# Patient Record
Sex: FEMALE | Race: OTHER | Employment: UNEMPLOYED | URBAN - METROPOLITAN AREA
[De-identification: names, ages, dates, MRNs, and addresses within clinical notes are randomized per-mention and may not be internally consistent; named-entity substitution may affect disease eponyms.]

---

## 2017-01-07 ENCOUNTER — OFFICE VISIT (OUTPATIENT)
Dept: FAMILY MEDICINE CLINIC | Age: 57
End: 2017-01-07

## 2017-01-07 VITALS
HEIGHT: 65 IN | TEMPERATURE: 98 F | SYSTOLIC BLOOD PRESSURE: 120 MMHG | RESPIRATION RATE: 14 BRPM | HEART RATE: 70 BPM | WEIGHT: 210 LBS | DIASTOLIC BLOOD PRESSURE: 80 MMHG | BODY MASS INDEX: 34.99 KG/M2

## 2017-01-07 DIAGNOSIS — M48.061 SPINAL STENOSIS, LUMBAR REGION, WITHOUT NEUROGENIC CLAUDICATION: ICD-10-CM

## 2017-01-07 DIAGNOSIS — E78.2 MIXED HYPERLIPIDEMIA: ICD-10-CM

## 2017-01-07 DIAGNOSIS — E11.9 TYPE 2 DIABETES MELLITUS WITHOUT COMPLICATION, WITHOUT LONG-TERM CURRENT USE OF INSULIN (HCC): ICD-10-CM

## 2017-01-07 DIAGNOSIS — I10 ESSENTIAL HYPERTENSION: ICD-10-CM

## 2017-01-07 DIAGNOSIS — E11.9 TYPE 2 DIABETES MELLITUS WITHOUT COMPLICATION, WITHOUT LONG-TERM CURRENT USE OF INSULIN (HCC): Primary | ICD-10-CM

## 2017-01-07 LAB
ALBUMIN SERPL-MCNC: 4.7 G/DL (ref 3.9–4.9)
ALP BLD-CCNC: 93 U/L (ref 40–130)
ALT SERPL-CCNC: 44 U/L (ref 0–33)
ANION GAP SERPL CALCULATED.3IONS-SCNC: 13 MEQ/L (ref 7–13)
AST SERPL-CCNC: 33 U/L (ref 0–35)
BILIRUB SERPL-MCNC: 0.3 MG/DL (ref 0–1.2)
BUN BLDV-MCNC: 11 MG/DL (ref 6–20)
CALCIUM SERPL-MCNC: 9.5 MG/DL (ref 8.6–10.2)
CHLORIDE BLD-SCNC: 102 MEQ/L (ref 98–107)
CHOLESTEROL, TOTAL: 213 MG/DL (ref 0–199)
CO2: 25 MEQ/L (ref 22–29)
CREAT SERPL-MCNC: 0.39 MG/DL (ref 0.5–0.9)
GFR AFRICAN AMERICAN: >60
GFR NON-AFRICAN AMERICAN: >60
GLOBULIN: 2.1 G/DL (ref 2.3–3.5)
GLUCOSE BLD-MCNC: 208 MG/DL (ref 74–109)
HBA1C MFR BLD: 7.1 % (ref 4.8–5.9)
HCT VFR BLD CALC: 43.5 % (ref 37–47)
HDLC SERPL-MCNC: 61 MG/DL (ref 40–59)
HEMOGLOBIN: 14.5 G/DL (ref 12–16)
LDL CHOLESTEROL CALCULATED: 105 MG/DL (ref 0–129)
MCH RBC QN AUTO: 29.4 PG (ref 27–31.3)
MCHC RBC AUTO-ENTMCNC: 33.3 % (ref 33–37)
MCV RBC AUTO: 88.2 FL (ref 82–100)
PDW BLD-RTO: 13.4 % (ref 11.5–14.5)
PLATELET # BLD: 252 K/UL (ref 130–400)
POTASSIUM SERPL-SCNC: 4.4 MEQ/L (ref 3.5–5.1)
RBC # BLD: 4.93 M/UL (ref 4.2–5.4)
SODIUM BLD-SCNC: 140 MEQ/L (ref 132–144)
TOTAL PROTEIN: 6.8 G/DL (ref 6.4–8.1)
TRIGL SERPL-MCNC: 237 MG/DL (ref 0–200)
WBC # BLD: 7.3 K/UL (ref 4.8–10.8)

## 2017-01-07 PROCEDURE — 99214 OFFICE O/P EST MOD 30 MIN: CPT | Performed by: FAMILY MEDICINE

## 2017-01-07 ASSESSMENT — PATIENT HEALTH QUESTIONNAIRE - PHQ9
2. FEELING DOWN, DEPRESSED OR HOPELESS: 0
SUM OF ALL RESPONSES TO PHQ QUESTIONS 1-9: 1
SUM OF ALL RESPONSES TO PHQ9 QUESTIONS 1 & 2: 1
1. LITTLE INTEREST OR PLEASURE IN DOING THINGS: 1

## 2017-01-19 ENCOUNTER — TELEPHONE (OUTPATIENT)
Dept: FAMILY MEDICINE CLINIC | Age: 57
End: 2017-01-19

## 2017-01-20 ENCOUNTER — OFFICE VISIT (OUTPATIENT)
Dept: FAMILY MEDICINE CLINIC | Age: 57
End: 2017-01-20

## 2017-01-20 VITALS
WEIGHT: 214 LBS | HEIGHT: 65 IN | SYSTOLIC BLOOD PRESSURE: 110 MMHG | TEMPERATURE: 98.6 F | OXYGEN SATURATION: 98 % | DIASTOLIC BLOOD PRESSURE: 62 MMHG | BODY MASS INDEX: 35.65 KG/M2 | HEART RATE: 101 BPM | RESPIRATION RATE: 16 BRPM

## 2017-01-20 DIAGNOSIS — J20.9 ACUTE BRONCHITIS, UNSPECIFIED ORGANISM: ICD-10-CM

## 2017-01-20 DIAGNOSIS — F17.200 TOBACCO USE DISORDER: ICD-10-CM

## 2017-01-20 DIAGNOSIS — J01.90 ACUTE NON-RECURRENT SINUSITIS, UNSPECIFIED LOCATION: Primary | ICD-10-CM

## 2017-01-20 PROCEDURE — 99214 OFFICE O/P EST MOD 30 MIN: CPT | Performed by: FAMILY MEDICINE

## 2017-01-20 RX ORDER — CLARITHROMYCIN 500 MG/1
500 TABLET, COATED ORAL 2 TIMES DAILY
Qty: 20 TABLET | Refills: 0 | Status: SHIPPED | OUTPATIENT
Start: 2017-01-20 | End: 2017-01-30

## 2017-01-20 RX ORDER — ALBUTEROL SULFATE 90 UG/1
2 AEROSOL, METERED RESPIRATORY (INHALATION) EVERY 6 HOURS PRN
Qty: 1 INHALER | Refills: 0 | Status: SHIPPED | OUTPATIENT
Start: 2017-01-20 | End: 2017-05-09

## 2017-01-20 RX ORDER — PREDNISONE 10 MG/1
30 TABLET ORAL DAILY
Qty: 15 TABLET | Refills: 0 | Status: SHIPPED | OUTPATIENT
Start: 2017-01-20 | End: 2017-01-25

## 2017-01-20 ASSESSMENT — ENCOUNTER SYMPTOMS
SORE THROAT: 1
SHORTNESS OF BREATH: 0
COUGH: 1
SINUS COMPLAINT: 1
EYES NEGATIVE: 1
GASTROINTESTINAL NEGATIVE: 1
SWOLLEN GLANDS: 0
SINUS PRESSURE: 1
RHINORRHEA: 1
ALLERGIC/IMMUNOLOGIC NEGATIVE: 1
HOARSE VOICE: 1

## 2017-02-24 DIAGNOSIS — E11.9 TYPE 2 DIABETES MELLITUS WITHOUT COMPLICATION, WITHOUT LONG-TERM CURRENT USE OF INSULIN (HCC): ICD-10-CM

## 2017-02-24 RX ORDER — LOVASTATIN 40 MG/1
40 TABLET ORAL DAILY
Qty: 90 TABLET | Refills: 1 | Status: SHIPPED | OUTPATIENT
Start: 2017-02-24 | End: 2017-05-09 | Stop reason: CLARIF

## 2017-04-24 ENCOUNTER — TELEPHONE (OUTPATIENT)
Dept: FAMILY MEDICINE CLINIC | Age: 57
End: 2017-04-24

## 2017-04-25 RX ORDER — PEN NEEDLE, DIABETIC 30 GX5/16"
NEEDLE, DISPOSABLE MISCELLANEOUS
Qty: 100 EACH | Refills: 1 | Status: SHIPPED | OUTPATIENT
Start: 2017-04-25 | End: 2017-05-09

## 2017-04-27 ENCOUNTER — TELEPHONE (OUTPATIENT)
Dept: FAMILY MEDICINE CLINIC | Age: 57
End: 2017-04-27

## 2017-04-28 RX ORDER — FLUCONAZOLE 150 MG/1
150 TABLET ORAL ONCE
Qty: 1 TABLET | Refills: 0 | Status: SHIPPED | OUTPATIENT
Start: 2017-04-28 | End: 2017-04-28

## 2017-05-09 ENCOUNTER — OFFICE VISIT (OUTPATIENT)
Dept: FAMILY MEDICINE CLINIC | Age: 57
End: 2017-05-09

## 2017-05-09 VITALS
BODY MASS INDEX: 34.16 KG/M2 | HEIGHT: 65 IN | HEART RATE: 76 BPM | SYSTOLIC BLOOD PRESSURE: 136 MMHG | TEMPERATURE: 97.8 F | WEIGHT: 205 LBS | RESPIRATION RATE: 16 BRPM | DIASTOLIC BLOOD PRESSURE: 78 MMHG

## 2017-05-09 DIAGNOSIS — E11.9 TYPE 2 DIABETES MELLITUS WITHOUT COMPLICATION, WITHOUT LONG-TERM CURRENT USE OF INSULIN (HCC): Primary | ICD-10-CM

## 2017-05-09 DIAGNOSIS — I10 ESSENTIAL HYPERTENSION: ICD-10-CM

## 2017-05-09 DIAGNOSIS — E78.2 MIXED HYPERLIPIDEMIA: ICD-10-CM

## 2017-05-09 PROCEDURE — 99214 OFFICE O/P EST MOD 30 MIN: CPT | Performed by: FAMILY MEDICINE

## 2017-05-09 RX ORDER — PEN NEEDLE, DIABETIC 31 GX5/16"
NEEDLE, DISPOSABLE MISCELLANEOUS
COMMUNITY
Start: 2017-04-25 | End: 2018-03-26

## 2017-05-09 RX ORDER — ATORVASTATIN CALCIUM 40 MG/1
40 TABLET, FILM COATED ORAL DAILY
Qty: 30 TABLET | Refills: 5 | Status: SHIPPED | OUTPATIENT
Start: 2017-05-09 | End: 2017-09-18 | Stop reason: SDUPTHER

## 2017-05-10 ENCOUNTER — TELEPHONE (OUTPATIENT)
Dept: FAMILY MEDICINE CLINIC | Age: 57
End: 2017-05-10

## 2017-05-10 RX ORDER — GLUCOSAMINE HCL/CHONDROITIN SU 500-400 MG
CAPSULE ORAL
Qty: 100 STRIP | Refills: 3 | Status: SHIPPED | OUTPATIENT
Start: 2017-05-10 | End: 2018-03-26

## 2017-05-10 RX ORDER — LANCETS 30 GAUGE
EACH MISCELLANEOUS
Qty: 100 EACH | Refills: 3 | Status: SHIPPED | OUTPATIENT
Start: 2017-05-10 | End: 2017-09-09 | Stop reason: SDUPTHER

## 2017-07-11 PROBLEM — M47.816 FACET HYPERTROPHY OF LUMBAR REGION: Status: ACTIVE | Noted: 2017-07-11

## 2017-07-11 PROBLEM — M47.26 OSTEOARTHRITIS OF SPINE WITH RADICULOPATHY, LUMBAR REGION: Status: ACTIVE | Noted: 2017-07-11

## 2017-07-17 RX ORDER — IBUPROFEN 800 MG/1
TABLET ORAL
Qty: 200 TABLET | Refills: 0 | Status: SHIPPED | OUTPATIENT
Start: 2017-07-17 | End: 2017-09-17 | Stop reason: SDUPTHER

## 2017-07-28 ENCOUNTER — INITIAL CONSULT (OUTPATIENT)
Dept: PODIATRY | Facility: CLINIC | Age: 57
End: 2017-07-28

## 2017-07-28 VITALS
BODY MASS INDEX: 33.32 KG/M2 | HEIGHT: 65 IN | TEMPERATURE: 96 F | WEIGHT: 200 LBS | DIASTOLIC BLOOD PRESSURE: 77 MMHG | SYSTOLIC BLOOD PRESSURE: 148 MMHG

## 2017-07-28 DIAGNOSIS — M79.671 PAIN IN BOTH FEET: Primary | ICD-10-CM

## 2017-07-28 DIAGNOSIS — L60.0 INGROWING NAIL: ICD-10-CM

## 2017-07-28 DIAGNOSIS — E11.42 DIABETIC POLYNEUROPATHY ASSOCIATED WITH TYPE 2 DIABETES MELLITUS (HCC): ICD-10-CM

## 2017-07-28 DIAGNOSIS — R26.2 DIFFICULTY WALKING: ICD-10-CM

## 2017-07-28 DIAGNOSIS — M79.672 PAIN IN BOTH FEET: Primary | ICD-10-CM

## 2017-07-28 DIAGNOSIS — B35.1 DERMATOPHYTOSIS OF NAIL: ICD-10-CM

## 2017-07-28 ASSESSMENT — ENCOUNTER SYMPTOMS
DIARRHEA: 0
BACK PAIN: 1
NAUSEA: 0
SHORTNESS OF BREATH: 0
CONSTIPATION: 0

## 2017-09-08 DIAGNOSIS — E78.2 MIXED HYPERLIPIDEMIA: ICD-10-CM

## 2017-09-08 DIAGNOSIS — I10 ESSENTIAL HYPERTENSION: Primary | ICD-10-CM

## 2017-09-08 DIAGNOSIS — E11.9 TYPE 2 DIABETES MELLITUS WITHOUT COMPLICATION, WITHOUT LONG-TERM CURRENT USE OF INSULIN (HCC): ICD-10-CM

## 2017-09-09 RX ORDER — LANCETS 28 GAUGE
EACH MISCELLANEOUS
Qty: 100 EACH | Refills: 3 | Status: SHIPPED | OUTPATIENT
Start: 2017-09-09 | End: 2018-03-26

## 2017-09-11 DIAGNOSIS — I10 ESSENTIAL HYPERTENSION: ICD-10-CM

## 2017-09-11 DIAGNOSIS — E11.9 TYPE 2 DIABETES MELLITUS WITHOUT COMPLICATION, WITHOUT LONG-TERM CURRENT USE OF INSULIN (HCC): ICD-10-CM

## 2017-09-11 DIAGNOSIS — E78.2 MIXED HYPERLIPIDEMIA: ICD-10-CM

## 2017-09-11 LAB
ALBUMIN SERPL-MCNC: 4.4 G/DL (ref 3.9–4.9)
ALP BLD-CCNC: 95 U/L (ref 40–130)
ALT SERPL-CCNC: 26 U/L (ref 0–33)
ANION GAP SERPL CALCULATED.3IONS-SCNC: 18 MEQ/L (ref 7–13)
AST SERPL-CCNC: 24 U/L (ref 0–35)
BILIRUB SERPL-MCNC: 0.6 MG/DL (ref 0–1.2)
BUN BLDV-MCNC: 12 MG/DL (ref 6–20)
CALCIUM SERPL-MCNC: 9.4 MG/DL (ref 8.6–10.2)
CHLORIDE BLD-SCNC: 103 MEQ/L (ref 98–107)
CHOLESTEROL, TOTAL: 152 MG/DL (ref 0–199)
CO2: 24 MEQ/L (ref 22–29)
CREAT SERPL-MCNC: 0.49 MG/DL (ref 0.5–0.9)
CREATININE URINE: 171 MG/DL
GFR AFRICAN AMERICAN: >60
GFR NON-AFRICAN AMERICAN: >60
GLOBULIN: 2.3 G/DL (ref 2.3–3.5)
GLUCOSE BLD-MCNC: 115 MG/DL (ref 74–109)
HBA1C MFR BLD: 6.8 % (ref 4.8–5.9)
HDLC SERPL-MCNC: 61 MG/DL (ref 40–59)
LDL CHOLESTEROL CALCULATED: 62 MG/DL (ref 0–129)
MICROALBUMIN UR-MCNC: 6.8 MG/DL
MICROALBUMIN/CREAT UR-RTO: 39.8 MG/G (ref 0–30)
POTASSIUM SERPL-SCNC: 4.4 MEQ/L (ref 3.5–5.1)
SODIUM BLD-SCNC: 145 MEQ/L (ref 132–144)
TOTAL PROTEIN: 6.7 G/DL (ref 6.4–8.1)
TRIGL SERPL-MCNC: 146 MG/DL (ref 0–200)

## 2017-09-18 ENCOUNTER — OFFICE VISIT (OUTPATIENT)
Dept: FAMILY MEDICINE CLINIC | Age: 57
End: 2017-09-18

## 2017-09-18 VITALS
DIASTOLIC BLOOD PRESSURE: 80 MMHG | TEMPERATURE: 98.1 F | BODY MASS INDEX: 33.82 KG/M2 | HEIGHT: 65 IN | WEIGHT: 203 LBS | RESPIRATION RATE: 16 BRPM | HEART RATE: 76 BPM | SYSTOLIC BLOOD PRESSURE: 148 MMHG

## 2017-09-18 DIAGNOSIS — Z12.31 VISIT FOR SCREENING MAMMOGRAM: ICD-10-CM

## 2017-09-18 DIAGNOSIS — I15.2 HYPERTENSION DUE TO ENDOCRINE DISORDER: ICD-10-CM

## 2017-09-18 DIAGNOSIS — L72.0 EPIDERMAL CYST: ICD-10-CM

## 2017-09-18 DIAGNOSIS — M47.26 OSTEOARTHRITIS OF SPINE WITH RADICULOPATHY, LUMBAR REGION: ICD-10-CM

## 2017-09-18 DIAGNOSIS — Z23 NEED FOR INFLUENZA VACCINATION: ICD-10-CM

## 2017-09-18 DIAGNOSIS — I10 ESSENTIAL HYPERTENSION: ICD-10-CM

## 2017-09-18 DIAGNOSIS — E11.9 TYPE 2 DIABETES MELLITUS WITHOUT COMPLICATION, WITHOUT LONG-TERM CURRENT USE OF INSULIN (HCC): Primary | ICD-10-CM

## 2017-09-18 DIAGNOSIS — E78.2 MIXED HYPERLIPIDEMIA: ICD-10-CM

## 2017-09-18 PROCEDURE — 99214 OFFICE O/P EST MOD 30 MIN: CPT | Performed by: FAMILY MEDICINE

## 2017-09-18 PROCEDURE — 90686 IIV4 VACC NO PRSV 0.5 ML IM: CPT | Performed by: FAMILY MEDICINE

## 2017-09-18 PROCEDURE — 90471 IMMUNIZATION ADMIN: CPT | Performed by: FAMILY MEDICINE

## 2017-09-18 RX ORDER — IBUPROFEN 800 MG/1
TABLET ORAL
Qty: 200 TABLET | Refills: 1 | Status: SHIPPED | OUTPATIENT
Start: 2017-09-18 | End: 2018-01-25 | Stop reason: SDUPTHER

## 2017-09-18 RX ORDER — AMLODIPINE BESYLATE 2.5 MG/1
2.5 TABLET ORAL DAILY
Qty: 90 TABLET | Refills: 1 | Status: SHIPPED | OUTPATIENT
Start: 2017-09-18 | End: 2017-11-22 | Stop reason: SDUPTHER

## 2017-09-18 RX ORDER — ATORVASTATIN CALCIUM 40 MG/1
40 TABLET, FILM COATED ORAL DAILY
Qty: 90 TABLET | Refills: 1 | Status: SHIPPED | OUTPATIENT
Start: 2017-09-18 | End: 2018-05-03 | Stop reason: SDUPTHER

## 2017-10-03 ENCOUNTER — HOSPITAL ENCOUNTER (OUTPATIENT)
Dept: WOMENS IMAGING | Age: 57
Discharge: HOME | End: 2017-10-03
Payer: COMMERCIAL

## 2017-10-03 DIAGNOSIS — Z12.31 VISIT FOR SCREENING MAMMOGRAM: ICD-10-CM

## 2017-10-03 PROCEDURE — G0202 SCR MAMMO BI INCL CAD: HCPCS

## 2017-10-16 ENCOUNTER — HOSPITAL ENCOUNTER (OUTPATIENT)
Age: 57
Setting detail: OUTPATIENT SURGERY
Discharge: HOME | End: 2017-10-16
Attending: SURGERY | Admitting: SURGERY
Payer: COMMERCIAL

## 2017-10-16 VITALS
TEMPERATURE: 98 F | OXYGEN SATURATION: 98 % | RESPIRATION RATE: 20 BRPM | SYSTOLIC BLOOD PRESSURE: 120 MMHG | DIASTOLIC BLOOD PRESSURE: 76 MMHG | HEART RATE: 70 BPM

## 2017-10-16 PROCEDURE — 7100000010 HC PHASE II RECOVERY - FIRST 15 MIN: Performed by: SURGERY

## 2017-10-16 PROCEDURE — 2500000003 HC RX 250 WO HCPCS

## 2017-10-16 PROCEDURE — 2580000003 HC RX 258: Performed by: SURGERY

## 2017-10-16 PROCEDURE — 88304 TISSUE EXAM BY PATHOLOGIST: CPT

## 2017-10-16 PROCEDURE — 3600000012 HC SURGERY LEVEL 2 ADDTL 15MIN: Performed by: SURGERY

## 2017-10-16 PROCEDURE — 3600000002 HC SURGERY LEVEL 2 BASE: Performed by: SURGERY

## 2017-10-16 PROCEDURE — 6370000000 HC RX 637 (ALT 250 FOR IP): Performed by: SURGERY

## 2017-10-16 RX ORDER — MAGNESIUM HYDROXIDE 1200 MG/15ML
LIQUID ORAL CONTINUOUS PRN
Status: DISCONTINUED | OUTPATIENT
Start: 2017-10-16 | End: 2017-10-16 | Stop reason: HOSPADM

## 2017-10-16 RX ORDER — GINSENG 100 MG
CAPSULE ORAL PRN
Status: DISCONTINUED | OUTPATIENT
Start: 2017-10-16 | End: 2017-10-16 | Stop reason: HOSPADM

## 2017-10-16 ASSESSMENT — PAIN - FUNCTIONAL ASSESSMENT: PAIN_FUNCTIONAL_ASSESSMENT: 0-10

## 2017-10-23 DIAGNOSIS — I15.2 HYPERTENSION DUE TO ENDOCRINE DISORDER: ICD-10-CM

## 2017-10-23 RX ORDER — AMLODIPINE BESYLATE 2.5 MG/1
2.5 TABLET ORAL DAILY
Qty: 90 TABLET | Refills: 0 | OUTPATIENT
Start: 2017-10-23

## 2017-11-05 DIAGNOSIS — E11.9 TYPE 2 DIABETES MELLITUS WITHOUT COMPLICATION, WITHOUT LONG-TERM CURRENT USE OF INSULIN (HCC): ICD-10-CM

## 2017-11-20 PROBLEM — M47.817 LUMBOSACRAL SPONDYLOSIS WITHOUT MYELOPATHY: Status: ACTIVE | Noted: 2017-11-20

## 2017-11-22 DIAGNOSIS — I15.2 HYPERTENSION DUE TO ENDOCRINE DISORDER: ICD-10-CM

## 2017-11-22 RX ORDER — AMLODIPINE BESYLATE 2.5 MG/1
2.5 TABLET ORAL DAILY
Qty: 90 TABLET | Refills: 1 | Status: SHIPPED | OUTPATIENT
Start: 2017-11-22 | End: 2018-05-06 | Stop reason: SDUPTHER

## 2018-01-26 RX ORDER — IBUPROFEN 800 MG/1
TABLET ORAL
Qty: 200 TABLET | Refills: 0 | Status: SHIPPED | OUTPATIENT
Start: 2018-01-26 | End: 2018-03-29 | Stop reason: SDUPTHER

## 2018-03-09 DIAGNOSIS — I10 ESSENTIAL HYPERTENSION: Primary | ICD-10-CM

## 2018-03-09 DIAGNOSIS — E78.2 MIXED HYPERLIPIDEMIA: ICD-10-CM

## 2018-03-09 DIAGNOSIS — E11.9 TYPE 2 DIABETES MELLITUS WITHOUT COMPLICATION, WITHOUT LONG-TERM CURRENT USE OF INSULIN (HCC): ICD-10-CM

## 2018-03-12 DIAGNOSIS — I10 ESSENTIAL HYPERTENSION: ICD-10-CM

## 2018-03-12 DIAGNOSIS — E11.9 TYPE 2 DIABETES MELLITUS WITHOUT COMPLICATION, WITHOUT LONG-TERM CURRENT USE OF INSULIN (HCC): ICD-10-CM

## 2018-03-12 DIAGNOSIS — E78.2 MIXED HYPERLIPIDEMIA: ICD-10-CM

## 2018-03-12 LAB
ALBUMIN SERPL-MCNC: 4.5 G/DL (ref 3.9–4.9)
ALP BLD-CCNC: 93 U/L (ref 40–130)
ALT SERPL-CCNC: 21 U/L (ref 0–33)
ANION GAP SERPL CALCULATED.3IONS-SCNC: 18 MEQ/L (ref 7–13)
AST SERPL-CCNC: 22 U/L (ref 0–35)
BILIRUB SERPL-MCNC: 0.7 MG/DL (ref 0–1.2)
BUN BLDV-MCNC: 9 MG/DL (ref 6–20)
CALCIUM SERPL-MCNC: 9.3 MG/DL (ref 8.6–10.2)
CHLORIDE BLD-SCNC: 104 MEQ/L (ref 98–107)
CHOLESTEROL, TOTAL: 146 MG/DL (ref 0–199)
CO2: 23 MEQ/L (ref 22–29)
CREAT SERPL-MCNC: 0.49 MG/DL (ref 0.5–0.9)
GFR AFRICAN AMERICAN: >60
GFR NON-AFRICAN AMERICAN: >60
GLOBULIN: 2.1 G/DL (ref 2.3–3.5)
GLUCOSE BLD-MCNC: 125 MG/DL (ref 74–109)
HBA1C MFR BLD: 7.2 % (ref 4.8–5.9)
HDLC SERPL-MCNC: 65 MG/DL (ref 40–59)
LDL CHOLESTEROL CALCULATED: 62 MG/DL (ref 0–129)
POTASSIUM SERPL-SCNC: 4 MEQ/L (ref 3.5–5.1)
SODIUM BLD-SCNC: 145 MEQ/L (ref 132–144)
TOTAL PROTEIN: 6.6 G/DL (ref 6.4–8.1)
TRIGL SERPL-MCNC: 97 MG/DL (ref 0–200)

## 2018-03-16 ENCOUNTER — HOSPITAL ENCOUNTER (INPATIENT)
Age: 58
LOS: 2 days | Discharge: HOME | DRG: 133 | End: 2018-03-19
Attending: INTERNAL MEDICINE
Payer: COMMERCIAL

## 2018-03-16 ENCOUNTER — OFFICE VISIT (OUTPATIENT)
Dept: FAMILY MEDICINE CLINIC | Age: 58
End: 2018-03-16
Payer: COMMERCIAL

## 2018-03-16 ENCOUNTER — APPOINTMENT (OUTPATIENT)
Dept: GENERAL RADIOLOGY | Age: 58
DRG: 133 | End: 2018-03-16
Payer: COMMERCIAL

## 2018-03-16 VITALS
HEART RATE: 134 BPM | HEIGHT: 65 IN | WEIGHT: 212 LBS | BODY MASS INDEX: 35.32 KG/M2 | DIASTOLIC BLOOD PRESSURE: 86 MMHG | TEMPERATURE: 101.6 F | OXYGEN SATURATION: 86 % | SYSTOLIC BLOOD PRESSURE: 134 MMHG | RESPIRATION RATE: 36 BRPM

## 2018-03-16 DIAGNOSIS — R09.02 HYPOXIA: ICD-10-CM

## 2018-03-16 DIAGNOSIS — R06.03 RESPIRATORY DISTRESS: Primary | ICD-10-CM

## 2018-03-16 DIAGNOSIS — J44.1 COPD EXACERBATION (HCC): Primary | ICD-10-CM

## 2018-03-16 DIAGNOSIS — R50.9 FEVER, UNSPECIFIED FEVER CAUSE: ICD-10-CM

## 2018-03-16 PROBLEM — J96.00 ACUTE RESPIRATORY FAILURE (HCC): Status: ACTIVE | Noted: 2018-03-16

## 2018-03-16 LAB
ALBUMIN SERPL-MCNC: 4.4 G/DL (ref 3.9–4.9)
ALP BLD-CCNC: 94 U/L (ref 40–130)
ALT SERPL-CCNC: 22 U/L (ref 0–33)
ANION GAP SERPL CALCULATED.3IONS-SCNC: 16 MEQ/L (ref 7–13)
AST SERPL-CCNC: 24 U/L (ref 0–35)
BACTERIA: ABNORMAL /HPF
BASOPHILS ABSOLUTE: 0.1 K/UL (ref 0–0.2)
BASOPHILS RELATIVE PERCENT: 0.7 %
BILIRUB SERPL-MCNC: 0.6 MG/DL (ref 0–1.2)
BILIRUBIN URINE: NEGATIVE
BLOOD, URINE: ABNORMAL
BUN BLDV-MCNC: 10 MG/DL (ref 6–20)
CALCIUM SERPL-MCNC: 8.8 MG/DL (ref 8.6–10.2)
CHLORIDE BLD-SCNC: 102 MEQ/L (ref 98–107)
CLARITY: CLEAR
CO2: 24 MEQ/L (ref 22–29)
COLOR: YELLOW
CREAT SERPL-MCNC: 0.39 MG/DL (ref 0.5–0.9)
EKG ATRIAL RATE: 105 BPM
EKG P AXIS: 47 DEGREES
EKG P-R INTERVAL: 184 MS
EKG Q-T INTERVAL: 362 MS
EKG QRS DURATION: 82 MS
EKG QTC CALCULATION (BAZETT): 478 MS
EKG R AXIS: 32 DEGREES
EKG T AXIS: 38 DEGREES
EKG VENTRICULAR RATE: 105 BPM
EOSINOPHILS ABSOLUTE: 0.1 K/UL (ref 0–0.7)
EOSINOPHILS RELATIVE PERCENT: 0.6 %
EPITHELIAL CELLS, UA: ABNORMAL /HPF
GFR AFRICAN AMERICAN: >60
GFR NON-AFRICAN AMERICAN: >60
GLOBULIN: 2.6 G/DL (ref 2.3–3.5)
GLUCOSE BLD-MCNC: 115 MG/DL (ref 74–109)
GLUCOSE BLD-MCNC: 329 MG/DL (ref 60–115)
GLUCOSE URINE: NEGATIVE MG/DL
HCT VFR BLD CALC: 40.5 % (ref 37–47)
HEMOGLOBIN: 14 G/DL (ref 12–16)
INFLUENZA A ANTIBODY: NORMAL
INFLUENZA B ANTIBODY: NORMAL
INR BLD: 1
KETONES, URINE: NEGATIVE MG/DL
LACTIC ACID, SEPSIS: 1.7 MMOL/L (ref 0.5–1.9)
LEUKOCYTE ESTERASE, URINE: ABNORMAL
LYMPHOCYTES ABSOLUTE: 2.5 K/UL (ref 1–4.8)
LYMPHOCYTES RELATIVE PERCENT: 24.8 %
MCH RBC QN AUTO: 29.9 PG (ref 27–31.3)
MCHC RBC AUTO-ENTMCNC: 34.4 % (ref 33–37)
MCV RBC AUTO: 87 FL (ref 82–100)
MONOCYTES ABSOLUTE: 0.8 K/UL (ref 0.2–0.8)
MONOCYTES RELATIVE PERCENT: 7.6 %
NEUTROPHILS ABSOLUTE: 6.7 K/UL (ref 1.4–6.5)
NEUTROPHILS RELATIVE PERCENT: 66.3 %
NITRITE, URINE: NEGATIVE
PDW BLD-RTO: 13.7 % (ref 11.5–14.5)
PERFORMED ON: ABNORMAL
PH UA: 5.5 (ref 5–9)
PLATELET # BLD: 230 K/UL (ref 130–400)
POTASSIUM REFLEX MAGNESIUM: 3.7 MEQ/L (ref 3.5–5.1)
PROTEIN UA: 30 MG/DL
PROTHROMBIN TIME: 10.7 SEC (ref 8.1–13.7)
RAPID INFLUENZA  B AGN: NEGATIVE
RAPID INFLUENZA A AGN: NEGATIVE
RBC # BLD: 4.66 M/UL (ref 4.2–5.4)
RBC UA: ABNORMAL /HPF (ref 0–2)
SODIUM BLD-SCNC: 142 MEQ/L (ref 132–144)
SPECIFIC GRAVITY UA: 1.01 (ref 1–1.03)
TOTAL PROTEIN: 7 G/DL (ref 6.4–8.1)
UROBILINOGEN, URINE: 0.2 E.U./DL
WBC # BLD: 10.1 K/UL (ref 4.8–10.8)
WBC UA: ABNORMAL /HPF (ref 0–5)

## 2018-03-16 PROCEDURE — 6370000000 HC RX 637 (ALT 250 FOR IP): Performed by: NURSE PRACTITIONER

## 2018-03-16 PROCEDURE — 93005 ELECTROCARDIOGRAM TRACING: CPT

## 2018-03-16 PROCEDURE — 94664 DEMO&/EVAL PT USE INHALER: CPT

## 2018-03-16 PROCEDURE — 71045 X-RAY EXAM CHEST 1 VIEW: CPT

## 2018-03-16 PROCEDURE — 6360000002 HC RX W HCPCS: Performed by: NURSE PRACTITIONER

## 2018-03-16 PROCEDURE — 99285 EMERGENCY DEPT VISIT HI MDM: CPT

## 2018-03-16 PROCEDURE — G0378 HOSPITAL OBSERVATION PER HR: HCPCS

## 2018-03-16 PROCEDURE — 2580000003 HC RX 258: Performed by: NURSE PRACTITIONER

## 2018-03-16 PROCEDURE — 96365 THER/PROPH/DIAG IV INF INIT: CPT

## 2018-03-16 PROCEDURE — 87804 INFLUENZA ASSAY W/OPTIC: CPT | Performed by: NURSE PRACTITIONER

## 2018-03-16 PROCEDURE — 86403 PARTICLE AGGLUT ANTBDY SCRN: CPT

## 2018-03-16 PROCEDURE — 96367 TX/PROPH/DG ADDL SEQ IV INF: CPT

## 2018-03-16 PROCEDURE — 94640 AIRWAY INHALATION TREATMENT: CPT

## 2018-03-16 PROCEDURE — 96375 TX/PRO/DX INJ NEW DRUG ADDON: CPT

## 2018-03-16 PROCEDURE — 94760 N-INVAS EAR/PLS OXIMETRY 1: CPT

## 2018-03-16 PROCEDURE — 36415 COLL VENOUS BLD VENIPUNCTURE: CPT

## 2018-03-16 PROCEDURE — 83605 ASSAY OF LACTIC ACID: CPT

## 2018-03-16 PROCEDURE — 87040 BLOOD CULTURE FOR BACTERIA: CPT

## 2018-03-16 PROCEDURE — 85610 PROTHROMBIN TIME: CPT

## 2018-03-16 PROCEDURE — 96366 THER/PROPH/DIAG IV INF ADDON: CPT

## 2018-03-16 PROCEDURE — 81001 URINALYSIS AUTO W/SCOPE: CPT

## 2018-03-16 PROCEDURE — 80053 COMPREHEN METABOLIC PANEL: CPT

## 2018-03-16 PROCEDURE — 85025 COMPLETE CBC W/AUTO DIFF WBC: CPT

## 2018-03-16 RX ORDER — OSELTAMIVIR PHOSPHATE 75 MG/1
75 CAPSULE ORAL ONCE
Status: COMPLETED | OUTPATIENT
Start: 2018-03-16 | End: 2018-03-16

## 2018-03-16 RX ORDER — DEXTROSE MONOHYDRATE 50 MG/ML
100 INJECTION, SOLUTION INTRAVENOUS PRN
Status: DISCONTINUED | OUTPATIENT
Start: 2018-03-16 | End: 2018-03-19 | Stop reason: HOSPADM

## 2018-03-16 RX ORDER — ACETAMINOPHEN 325 MG/1
650 TABLET ORAL EVERY 4 HOURS PRN
Status: DISCONTINUED | OUTPATIENT
Start: 2018-03-16 | End: 2018-03-19 | Stop reason: HOSPADM

## 2018-03-16 RX ORDER — METHYLPREDNISOLONE SODIUM SUCCINATE 40 MG/ML
40 INJECTION, POWDER, LYOPHILIZED, FOR SOLUTION INTRAMUSCULAR; INTRAVENOUS DAILY
Status: DISCONTINUED | OUTPATIENT
Start: 2018-03-17 | End: 2018-03-17

## 2018-03-16 RX ORDER — ONDANSETRON 2 MG/ML
4 INJECTION INTRAMUSCULAR; INTRAVENOUS EVERY 6 HOURS PRN
Status: DISCONTINUED | OUTPATIENT
Start: 2018-03-16 | End: 2018-03-19 | Stop reason: HOSPADM

## 2018-03-16 RX ORDER — NICOTINE POLACRILEX 4 MG
15 LOZENGE BUCCAL PRN
Status: DISCONTINUED | OUTPATIENT
Start: 2018-03-16 | End: 2018-03-19 | Stop reason: HOSPADM

## 2018-03-16 RX ORDER — IPRATROPIUM BROMIDE AND ALBUTEROL SULFATE 2.5; .5 MG/3ML; MG/3ML
1 SOLUTION RESPIRATORY (INHALATION) ONCE
Status: COMPLETED | OUTPATIENT
Start: 2018-03-16 | End: 2018-03-16

## 2018-03-16 RX ORDER — ALBUTEROL SULFATE 2.5 MG/3ML
2.5 SOLUTION RESPIRATORY (INHALATION)
Status: DISCONTINUED | OUTPATIENT
Start: 2018-03-16 | End: 2018-03-19 | Stop reason: HOSPADM

## 2018-03-16 RX ORDER — ACETAMINOPHEN 500 MG
1000 TABLET ORAL ONCE
Status: COMPLETED | OUTPATIENT
Start: 2018-03-16 | End: 2018-03-16

## 2018-03-16 RX ORDER — DEXTROSE MONOHYDRATE 25 G/50ML
12.5 INJECTION, SOLUTION INTRAVENOUS PRN
Status: DISCONTINUED | OUTPATIENT
Start: 2018-03-16 | End: 2018-03-19 | Stop reason: HOSPADM

## 2018-03-16 RX ORDER — SODIUM CHLORIDE 0.9 % (FLUSH) 0.9 %
10 SYRINGE (ML) INJECTION EVERY 12 HOURS SCHEDULED
Status: DISCONTINUED | OUTPATIENT
Start: 2018-03-16 | End: 2018-03-19 | Stop reason: HOSPADM

## 2018-03-16 RX ORDER — IPRATROPIUM BROMIDE AND ALBUTEROL SULFATE 2.5; .5 MG/3ML; MG/3ML
1 SOLUTION RESPIRATORY (INHALATION) EVERY 4 HOURS PRN
Status: DISCONTINUED | OUTPATIENT
Start: 2018-03-16 | End: 2018-03-17

## 2018-03-16 RX ORDER — SODIUM CHLORIDE 0.9 % (FLUSH) 0.9 %
10 SYRINGE (ML) INJECTION PRN
Status: DISCONTINUED | OUTPATIENT
Start: 2018-03-16 | End: 2018-03-19 | Stop reason: HOSPADM

## 2018-03-16 RX ORDER — OSELTAMIVIR PHOSPHATE 75 MG/1
75 CAPSULE ORAL 2 TIMES DAILY
Status: DISCONTINUED | OUTPATIENT
Start: 2018-03-17 | End: 2018-03-17

## 2018-03-16 RX ORDER — METHYLPREDNISOLONE SODIUM SUCCINATE 125 MG/2ML
125 INJECTION, POWDER, LYOPHILIZED, FOR SOLUTION INTRAMUSCULAR; INTRAVENOUS ONCE
Status: COMPLETED | OUTPATIENT
Start: 2018-03-16 | End: 2018-03-16

## 2018-03-16 RX ADMIN — ACETAMINOPHEN 1000 MG: 500 TABLET ORAL at 15:33

## 2018-03-16 RX ADMIN — OSELTAMIVIR PHOSPHATE 75 MG: 75 CAPSULE ORAL at 17:49

## 2018-03-16 RX ADMIN — Medication 10 ML: at 23:37

## 2018-03-16 RX ADMIN — ALBUTEROL SULFATE 5 MG: 2.5 SOLUTION RESPIRATORY (INHALATION) at 15:18

## 2018-03-16 RX ADMIN — AZITHROMYCIN MONOHYDRATE 500 MG: 500 INJECTION, POWDER, LYOPHILIZED, FOR SOLUTION INTRAVENOUS at 16:55

## 2018-03-16 RX ADMIN — METHYLPREDNISOLONE SODIUM SUCCINATE 125 MG: 125 INJECTION, POWDER, FOR SOLUTION INTRAMUSCULAR; INTRAVENOUS at 15:33

## 2018-03-16 RX ADMIN — CEFTRIAXONE 1 G: 1 INJECTION, POWDER, FOR SOLUTION INTRAMUSCULAR; INTRAVENOUS at 16:36

## 2018-03-16 RX ADMIN — IPRATROPIUM BROMIDE AND ALBUTEROL SULFATE 1 AMPULE: .5; 3 SOLUTION RESPIRATORY (INHALATION) at 15:18

## 2018-03-16 ASSESSMENT — PAIN SCALES - GENERAL
PAINLEVEL_OUTOF10: 10
PAINLEVEL_OUTOF10: 5
PAINLEVEL_OUTOF10: 7
PAINLEVEL_OUTOF10: 0

## 2018-03-16 ASSESSMENT — ENCOUNTER SYMPTOMS
DIARRHEA: 0
SHORTNESS OF BREATH: 1
RHINORRHEA: 1
DIARRHEA: 1
CONSTIPATION: 0
WHEEZING: 1
CHEST TIGHTNESS: 1
VOMITING: 1
VOMITING: 0
NAUSEA: 0
SHORTNESS OF BREATH: 1
SINUS PAIN: 0
SORE THROAT: 0
TROUBLE SWALLOWING: 0
COUGH: 1
HEMOPTYSIS: 0
ABDOMINAL PAIN: 0
WHEEZING: 1
TROUBLE SWALLOWING: 0
NAUSEA: 0
ABDOMINAL PAIN: 0
SINUS PRESSURE: 1
ABDOMINAL DISTENTION: 0
COUGH: 1
HEARTBURN: 0

## 2018-03-16 ASSESSMENT — PAIN DESCRIPTION - LOCATION: LOCATION: CHEST;RIB CAGE

## 2018-03-16 ASSESSMENT — PAIN DESCRIPTION - PAIN TYPE
TYPE: ACUTE PAIN
TYPE: ACUTE PAIN

## 2018-03-16 ASSESSMENT — PAIN DESCRIPTION - PROGRESSION: CLINICAL_PROGRESSION: GRADUALLY IMPROVING

## 2018-03-16 NOTE — H&P
MG tablet Take 1 tablet by mouth 3 times daily 9/18/17  Yes Mercy Sood MD   atorvastatin (LIPITOR) 40 MG tablet Take 1 tablet by mouth daily 9/18/17  Yes Mercy Sood MD   Handicap Placard MISC by Does not apply route 9/18/17  Yes Mercy Sood MD   FREESTYLE LANCETS MISC USE TWICE DAILY 9/9/17  Yes Mercy Sood MD   Glucose Blood (BLOOD GLUCOSE TEST STRIPS) STRP Checks sugar twice daily. Dx:NIDDM--ICD-10 E11.9 5/10/17  Yes Mercy Sood MD   B-D UF III MINI PEN NEEDLES 31G X 5 MM MISC  4/25/17  Yes Historical Provider, MD   insulin detemir (LEVEMIR FLEXTOUCH) 100 UNIT/ML injection pen Inject 10 Units into the skin nightly 4/25/17  Yes Mercy Sood MD   metFORMIN (GLUCOPHAGE) 500 MG tablet TAKE 1 TABLET BY MOUTH THREE TIMES DAILY 11/6/17   Mercy Sood MD       Allergies:  Climmie Aloe [hydrocodone-acetaminophen]    Social History:      The patient currently lives alone    TOBACCO:   reports that she has been smoking Cigarettes. She has a 18.00 pack-year smoking history. She has never used smokeless tobacco.  ETOH:   reports that she does not drink alcohol. Family History:       Reviewed in detail and negative for DM, CAD, Cancer, CVA. Positive as follows:        Problem Relation Age of Onset    Diabetes Mother     High Blood Pressure Mother     High Cholesterol Mother     Thyroid Disease Mother     Diabetes Father        REVIEW OF SYSTEMS:   Pertinent positives as noted in the HPI. All other systems reviewed and negative. PHYSICAL EXAM:    /60   Pulse 94   Temp 99.3 °F (37.4 °C) (Oral)   Resp 22   Ht 5' 5\" (1.651 m)   Wt 212 lb (96.2 kg)   LMP  (LMP Unknown)   SpO2 98%   BMI 35.28 kg/m²     General appearance:  No apparent distress, appears stated age and cooperative. HEENT:  Normal cephalic, atraumatic without obvious deformity. Pupils equal, round, and reactive to light. Extra ocular muscles intact. Conjunctivae/corneas clear.   Neck: Supple, with full range of motion. No jugular venous distention. Trachea midline. Respiratory:  Increased work of breathing with inspiratory wheezing bilaterally  Cardiovascular:  Regular rate and rhythm with normal S1/S2   Abdomen: Soft, non-tender, non-distended with normal bowel sounds. Musculoskeletal:  No clubbing, cyanosis or edema bilaterally. Full range of motion without deformity. Skin: Skin color, texture, turgor normal.  No rashes or lesions. Neurologic:  Neurovascularly intact without any focal sensory/motor deficits. Cranial nerves: II-XII intact, grossly non-focal.  Psychiatric:  Alert and oriented, thought content appropriate, normal insight  Capillary Refill: Brisk,< 3 seconds   Peripheral Pulses: +2 palpable, equal bilaterally       Labs:     Recent Labs      03/16/18   1515   WBC  10.1   HGB  14.0   HCT  40.5   PLT  230     Recent Labs      03/16/18   1515   NA  142   K  3.7   CL  102   CO2  24   BUN  10   CREATININE  0.39*   CALCIUM  8.8     Recent Labs      03/16/18   1515   AST  24   ALT  22   BILITOT  0.6   ALKPHOS  94     Recent Labs      03/16/18   1515   INR  1.0     No results for input(s): [employer] in the last 72 hours. Urinalysis:      Lab Results   Component Value Date    NITRU Negative 03/16/2018    WBCUA 6-10 03/16/2018    BACTERIA Few 03/16/2018    RBCUA 10-20 03/16/2018    BLOODU MODERATE 03/16/2018    SPECGRAV 1.015 03/16/2018    GLUCOSEU Negative 03/16/2018       Radiology:     CXR: I have reviewed the CXR with the following interpretation: pending  EKG:  I have reviewed the EKG with the following interpretation: pending    XR CHEST PORTABLE   Final Result      No acute cardiopulmonary disease.           ASSESSMENT:    Active Hospital Problems    Diagnosis Date Noted    Acute respiratory failure ([facility] [address] [J96.00] 03/16/2018       PLAN:    1. New onset COPD vs flu with hypoxia- pulmonology consulted, IV steroids, IV antibiotics, Tamiflu, viral culture and blood cultures pending, breathing treatments as needed, encourage cough and deep breathing with use of IS and Acapella, continuous O2  2. HTN- stable, restart home cardiac medications  3. DM2- ac/hs and as needed poct glucose with SSI coverage  4. HDL    DVT Prophylaxis: lovenox  Diet:  carb  Code Status: Prior    PT/OT Eval and treat    Dispo - observation        ADRIAN Helm    Thank you Tor Chin MD for the opportunity to be involved in this patient's care. If you have any questions or concerns please feel free to contact me.

## 2018-03-16 NOTE — ED PROVIDER NOTES
[facility] ED  eMERGENCY dEPARTMENT eNCOUnter      Pt Name: Tish Bernal  MRN: [id number]  [place] 1960  Date of evaluation: 3/16/2018  Provider: Jessica Viera 66Trish       Chief Complaint   Patient presents with    Shortness of Breath    Cough     onset 2 days ago    Fever         HISTORY OF PRESENT ILLNESS   (Location/Symptom, Timing/Onset, Context/Setting, Quality, Duration, Modifying Factors, Severity)  Note limiting factors. Tish Bernal is a 59 y.o. female who presents to the emergency department With complaints of fever cough congestion and shortness of breath that began 2 days ago. Patient has recently returned from [place] by way of a car trip. She admits that several weeks ago she flew down to [place], flew to [place] and then drove back home. Over the course of the last 2 days she has developed a harsh cough with congestion and fever and shortness of breath. She has taken OTC medications without any relief of her symptoms. She denies any associated headaches and dizziness lightheadedness or chest pain nausea vomiting diarrhea constipation or abdominal pain. Nursing Notes were reviewed. REVIEW OF SYSTEMS    (2-9 systems for level 4, 10 or more for level 5)     Review of Systems   Constitutional: Positive for fever. Negative for chills, diaphoresis and fatigue. HENT: Positive for congestion. Negative for drooling and trouble swallowing. Respiratory: Positive for cough, shortness of breath and wheezing. Cardiovascular: Negative for chest pain and palpitations. Gastrointestinal: Negative for abdominal pain, diarrhea, nausea and vomiting. Genitourinary: Negative for dysuria and flank pain. Skin: Negative for rash. Neurological: Negative for dizziness, light-headedness and headaches. Except as noted above the remainder of the review of systems was reviewed and negative.        PAST MEDICAL HISTORY     Past Medical History:   Diagnosis wheezes. Patient has conversational dyspnea. She has a mild respiratory distress with wheezing. Her lung sounds are coarse in the base but no rhonchi or rales. Abdominal: Soft. Normal appearance and bowel sounds are normal. There is no tenderness. Neurological: She is alert and oriented to person, place, and time. She has normal strength. Skin: Skin is warm, dry and intact. No rash noted. She is not diaphoretic. Nursing note and vitals reviewed. DIAGNOSTIC RESULTS     EKG: All EKG's are interpreted by the Emergency Department Physician who either signs or Co-signs this chart in the absence of a cardiologist.    Sinus tachycardia at 105 beats per. No acute ST segment elevation    RADIOLOGY:   Non-plain film images such as CT, Ultrasound and MRI are read by the radiologist. Plain radiographic images are visualized and preliminarily interpreted by the emergency physician with the below findings:    Chest X-Ray demonstrates no acute infiltrate, effusion or pneumothorax. Interpretation per the Radiologist below, if available at the time of this note:    XR CHEST PORTABLE   Final Result      No acute cardiopulmonary disease. ED BEDSIDE ULTRASOUND:   Performed by ED Physician - none    LABS:  Labs Reviewed   CBC WITH AUTO DIFFERENTIAL - Abnormal; Notable for the following:        Result Value    Neutrophils # 6.7 (*)     All other components within normal limits   URINALYSIS - Abnormal; Notable for the following:     Blood, Urine MODERATE (*)     Protein, UA 30 (*)     Leukocyte Esterase, Urine TRACE (*)     All other components within normal limits   COMPREHENSIVE METABOLIC PANEL W/ REFLEX TO MG FOR LOW K - Abnormal; Notable for the following:      Anion Gap 16 (*)     Glucose 115 (*)     CREATININE 0.39 (*)     All other components within normal limits   RAPID INFLUENZA A/B ANTIGENS   CULTURE BLOOD #1   CULTURE BLOOD #2   LACTATE, SEPSIS   PROTIME-INR   LACTATE, SEPSIS   MICROSCOPIC occasionally words are mis-transcribed.)    Noy Spangler CNP (electronically signed)  Attending Emergency Physician         Noy Spangler, [address] [facility]  03/16/18 [address], [address] [facility]  03/16/18 [id number]

## 2018-03-16 NOTE — ED TRIAGE NOTES
[facility] with pox in the 80's, sob, Francois Nieto brought pt over by [facility], pt alert, skin warm and dry, coughing, non productive, pt using muscles to breathe with effort, placed o2 2l nc on pt pox 92%

## 2018-03-16 NOTE — PROGRESS NOTES
This Encounter   Procedures    POCT Influenza A/B     Results for POC orders placed in visit on 03/16/18   POCT Influenza A/B   Result Value Ref Range    Influenza A Ab neg     Influenza B Ab neg        No orders of the defined types were placed in this encounter. There are no discontinued medications. Return for ER STAT. Reviewed with the patient: current clinical status, medications, activities and diet. Side effects, adverse effects of the medication prescribed today, as well as treatment plan/ rationale and result expectations have been discussed with the patient who expresses understanding and desires to proceed. Close follow up to evaluate treatment results and for coordination of care. I have reviewed the patient's medical history in detail and updated the computerized patient record.     Heidi Pat NP

## 2018-03-17 PROBLEM — J96.90 RESPIRATORY FAILURE (HCC): Status: ACTIVE | Noted: 2018-03-17

## 2018-03-17 LAB
ANION GAP SERPL CALCULATED.3IONS-SCNC: 14 MEQ/L (ref 7–13)
BUN BLDV-MCNC: 15 MG/DL (ref 6–20)
CALCIUM SERPL-MCNC: 9.3 MG/DL (ref 8.6–10.2)
CHLORIDE BLD-SCNC: 100 MEQ/L (ref 98–107)
CO2: 26 MEQ/L (ref 22–29)
CREAT SERPL-MCNC: 0.39 MG/DL (ref 0.5–0.9)
GFR AFRICAN AMERICAN: >60
GFR NON-AFRICAN AMERICAN: >60
GLUCOSE BLD-MCNC: 165 MG/DL (ref 74–109)
GLUCOSE BLD-MCNC: 176 MG/DL (ref 60–115)
GLUCOSE BLD-MCNC: 184 MG/DL (ref 60–115)
GLUCOSE BLD-MCNC: 194 MG/DL (ref 60–115)
GLUCOSE BLD-MCNC: 270 MG/DL (ref 60–115)
GLUCOSE BLD-MCNC: 296 MG/DL (ref 60–115)
HCT VFR BLD CALC: 36.9 % (ref 37–47)
HEMOGLOBIN: 12.8 G/DL (ref 12–16)
LACTIC ACID, SEPSIS: 1.6 MMOL/L (ref 0.5–1.9)
MCH RBC QN AUTO: 30.2 PG (ref 27–31.3)
MCHC RBC AUTO-ENTMCNC: 34.6 % (ref 33–37)
MCV RBC AUTO: 87.3 FL (ref 82–100)
PDW BLD-RTO: 13.5 % (ref 11.5–14.5)
PERFORMED ON: ABNORMAL
PLATELET # BLD: 237 K/UL (ref 130–400)
POTASSIUM REFLEX MAGNESIUM: 4.5 MEQ/L (ref 3.5–5.1)
RBC # BLD: 4.23 M/UL (ref 4.2–5.4)
SODIUM BLD-SCNC: 140 MEQ/L (ref 132–144)
WBC # BLD: 14 K/UL (ref 4.8–10.8)

## 2018-03-17 PROCEDURE — 80048 BASIC METABOLIC PNL TOTAL CA: CPT

## 2018-03-17 PROCEDURE — 96376 TX/PRO/DX INJ SAME DRUG ADON: CPT

## 2018-03-17 PROCEDURE — 6370000000 HC RX 637 (ALT 250 FOR IP): Performed by: INTERNAL MEDICINE

## 2018-03-17 PROCEDURE — 2580000003 HC RX 258: Performed by: NURSE PRACTITIONER

## 2018-03-17 PROCEDURE — 83605 ASSAY OF LACTIC ACID: CPT

## 2018-03-17 PROCEDURE — 85027 COMPLETE CBC AUTOMATED: CPT

## 2018-03-17 PROCEDURE — 6360000002 HC RX W HCPCS: Performed by: INTERNAL MEDICINE

## 2018-03-17 PROCEDURE — 99223 1ST HOSP IP/OBS HIGH 75: CPT | Performed by: INTERNAL MEDICINE

## 2018-03-17 PROCEDURE — 36415 COLL VENOUS BLD VENIPUNCTURE: CPT

## 2018-03-17 PROCEDURE — 94640 AIRWAY INHALATION TREATMENT: CPT

## 2018-03-17 PROCEDURE — 1210000000 HC MED SURG R&B

## 2018-03-17 PROCEDURE — 2700000000 HC OXYGEN THERAPY PER DAY

## 2018-03-17 PROCEDURE — 96372 THER/PROPH/DIAG INJ SC/IM: CPT

## 2018-03-17 PROCEDURE — 6360000002 HC RX W HCPCS: Performed by: NURSE PRACTITIONER

## 2018-03-17 PROCEDURE — 6370000000 HC RX 637 (ALT 250 FOR IP): Performed by: NURSE PRACTITIONER

## 2018-03-17 RX ORDER — IPRATROPIUM BROMIDE AND ALBUTEROL SULFATE 2.5; .5 MG/3ML; MG/3ML
1 SOLUTION RESPIRATORY (INHALATION) EVERY 6 HOURS
Status: DISCONTINUED | OUTPATIENT
Start: 2018-03-17 | End: 2018-03-19 | Stop reason: HOSPADM

## 2018-03-17 RX ORDER — METHYLPREDNISOLONE SODIUM SUCCINATE 40 MG/ML
40 INJECTION, POWDER, LYOPHILIZED, FOR SOLUTION INTRAMUSCULAR; INTRAVENOUS EVERY 6 HOURS
Status: DISCONTINUED | OUTPATIENT
Start: 2018-03-17 | End: 2018-03-19

## 2018-03-17 RX ADMIN — IPRATROPIUM BROMIDE AND ALBUTEROL SULFATE 1 AMPULE: .5; 3 SOLUTION RESPIRATORY (INHALATION) at 14:50

## 2018-03-17 RX ADMIN — IPRATROPIUM BROMIDE AND ALBUTEROL SULFATE 1 AMPULE: .5; 3 SOLUTION RESPIRATORY (INHALATION) at 20:50

## 2018-03-17 RX ADMIN — METHYLPREDNISOLONE SODIUM SUCCINATE 40 MG: 40 INJECTION, POWDER, FOR SOLUTION INTRAMUSCULAR; INTRAVENOUS at 21:19

## 2018-03-17 RX ADMIN — METHYLPREDNISOLONE SODIUM SUCCINATE 40 MG: 40 INJECTION, POWDER, FOR SOLUTION INTRAMUSCULAR; INTRAVENOUS at 08:45

## 2018-03-17 RX ADMIN — INSULIN LISPRO 3 UNITS: 100 INJECTION, SOLUTION INTRAVENOUS; SUBCUTANEOUS at 17:53

## 2018-03-17 RX ADMIN — INSULIN LISPRO 1 UNITS: 100 INJECTION, SOLUTION INTRAVENOUS; SUBCUTANEOUS at 09:00

## 2018-03-17 RX ADMIN — INSULIN LISPRO 1 UNITS: 100 INJECTION, SOLUTION INTRAVENOUS; SUBCUTANEOUS at 12:46

## 2018-03-17 RX ADMIN — AZITHROMYCIN MONOHYDRATE 500 MG: 500 INJECTION, POWDER, LYOPHILIZED, FOR SOLUTION INTRAVENOUS at 16:44

## 2018-03-17 RX ADMIN — Medication 10 ML: at 08:45

## 2018-03-17 RX ADMIN — CEFTRIAXONE 1 G: 1 INJECTION, POWDER, FOR SOLUTION INTRAMUSCULAR; INTRAVENOUS at 16:05

## 2018-03-17 RX ADMIN — ENOXAPARIN SODIUM 40 MG: 40 INJECTION SUBCUTANEOUS at 09:01

## 2018-03-17 RX ADMIN — Medication 10 ML: at 21:19

## 2018-03-17 RX ADMIN — OSELTAMIVIR PHOSPHATE 75 MG: 75 CAPSULE ORAL at 09:00

## 2018-03-17 RX ADMIN — METHYLPREDNISOLONE SODIUM SUCCINATE 40 MG: 40 INJECTION, POWDER, FOR SOLUTION INTRAMUSCULAR; INTRAVENOUS at 14:24

## 2018-03-17 ASSESSMENT — ENCOUNTER SYMPTOMS
NAUSEA: 0
SHORTNESS OF BREATH: 1

## 2018-03-17 NOTE — PROGRESS NOTES
Bg Lazaro is a 59 y.o. female patient.     Current Facility-Administered Medications   Medication Dose Route Frequency Provider Last Rate Last Dose    methylPREDNISolone sodium (SOLU-MEDROL) injection 40 mg  40 mg Intravenous Q6H Amanda Quiles MD   40 mg at 03/17/18 1424    ipratropium-albuterol (DUONEB) nebulizer solution 1 ampule  1 ampule Inhalation Q6H Amanda Quiles MD   1 ampule at 03/17/18 1450    sodium chloride flush 0.9 % injection 10 mL  10 mL Intravenous 2 times per day Gurmeet Gubler, APRN   10 mL at 03/17/18 0845    sodium chloride flush 0.9 % injection 10 mL  10 mL Intravenous PRN Gurmeet Gubler, APRN        acetaminophen (TYLENOL) tablet 650 mg  650 mg Oral Q4H PRN Gurmeet Gubler, APRN        magnesium hydroxide (MILK OF MAGNESIA) 400 MG/5ML suspension 30 mL  30 mL Oral Daily PRN Gurmeet Gubler, APRN        ondansetron [facility]) injection 4 mg  4 mg Intravenous Q6H PRN Gurmeet Gubler, APRN        enoxaparin (LOVENOX) injection 40 mg  40 mg Subcutaneous Daily Gurmeet Bryanbler, APRN   40 mg at 03/17/18 0901    albuterol (PROVENTIL) nebulizer solution 2.5 mg  2.5 mg Nebulization Q2H PRN Gurmeet Gubler, APRN        azithromycin (ZITHROMAX) 500 mg in D5W 250ml addavial  500 mg Intravenous Q24H Gurmeet Bryanbler, APRN 250 mL/hr at 03/17/18 1644 500 mg at 03/17/18 1644    cefTRIAXone (ROCEPHIN) 1 g IVPB in 50 mL D5W minibag  1 g Intravenous Q24H Gurmeet Gubler, APRN   Stopped at 03/17/18 1644    glucose (GLUTOSE) 40 % oral gel 15 g  15 g Oral PRN Gurmeet Gubler, APRN        dextrose 50 % solution 12.5 g  12.5 g Intravenous PRN Gurmeet Gubler, APRN        glucagon (rDNA) injection 1 mg  1 mg Intramuscular PRN Gurmeet Gubler, APRN        dextrose 5 % solution  100 mL/hr Intravenous PRN Gurmeet Gubler, APRN        insulin lispro (HUMALOG) injection vial 0-6 Units  0-6 Units Subcutaneous TID  ADRIAN Bates   3 Units at 03/17/18 1753    insulin lispro (HUMALOG) injection

## 2018-03-17 NOTE — CARE COORDINATION
LSW spoke with pt regarding her DC needs. Pt lives at home with her daughter and grandchildren and her plan is to return home at DC. Pt does not have home oxygen or a nebulized for home at this time. Pt ambulates independently.

## 2018-03-17 NOTE — CONSULTS
mL Intravenous 2 times per day    enoxaparin  40 mg Subcutaneous Daily    azithromycin  500 mg Intravenous Q24H    cefTRIAXone (ROCEPHIN) IV  1 g Intravenous Q24H    insulin lispro  0-6 Units Subcutaneous TID WC    insulin lispro  0-3 Units Subcutaneous Nightly       PRN Meds:sodium chloride flush, acetaminophen, magnesium hydroxide, ondansetron, albuterol, glucose, dextrose, glucagon (rDNA), dextrose    REVIEW OF SYSTEMS:  As in history of present illness  Other 14 point review of system is negative. PHYSICAL EXAM:    Vitals:  /63   Pulse 71   Temp 98.1 °F (36.7 °C)   Resp 16   Ht 5' 5\" (1.651 m)   Wt 208 lb 15.9 oz (94.8 kg)   LMP  (LMP Unknown)   SpO2 93%   BMI 34.78 kg/m²   General: Alert, awake, Oriented x3  .comfortable in bed, No distress. Head: Atraumatic , Normocephalic   Eyes: PERRL. No sclera icterus. No conjunctival injection. No discharge   ENT: No nasal  discharge. Pharynx clear. Neck:  Trachea midline. No thyromegaly, no JVD, No cervical adenopathy. Chest : Bilaterally symmetrical ,Normal effort,  No accessory muscle use  Lung : . Fair BS bilateral, decreased BS at bases. No Rales. Bilateral expiratory wheezing. No rhonchi. No dullness on percussion. Heart[de-identified] Normal  rate. Regular rhythm. No mumur ,  Rub or gallop  ABD: Non-tender. Non-distended. No masses. No organmegaly. Normal bowel sounds. No hernia. Musculoskeleton: normal range of motion in all extremites, strength and tone   Extremities: No pitting ,Cyanosis ,No clubbing  Neuro: no cranial nerve abnormality, normal reflex and sensation, no focal weakness   Skin: Warm and dry. No erythema rash on exposed extremities.         Data Review  Recent Labs      03/16/18   1515  03/17/18   0713   WBC  10.1  14.0*   HGB  14.0  12.8   HCT  40.5  36.9*   PLT  230  237      Recent Labs      03/16/18   1515  03/17/18   0713   NA  142  140   K  3.7  4.5   CL  102  100   CO2  24  26   BUN  10  15   CREATININE  0.39*  0.39* GLUCOSE  115*  165*     O2 Device: Nasal cannula  O2 Flow Rate (L/min): 2 L/min  No results found for: LACTA    Radiology  Xr Chest Portable    Result Date: 3/16/2018  Chest one view. HISTORY: Cardiopulmonary evaluation. FINDINGS: Comparison, December 3, 2013. Osseous structures intact. Cardiopericardial silhouette is normal. Pulmonary vasculature is normal. Lungs are clear. No acute cardiopulmonary disease. Assessment and plan    1. Acute COPD exacerbation secondary to bronchitis  2. Chronic smoking with nicotine dependency  3. Hypertension  4. Diverticulitis mellitus  5. Hyperlipidemia    Patient is currently on nebulizer with DuoNeb 4 times a day and albuterol every 2 hours when necessary . She is on Solu-Medrol 40 mg every 6 hourly. She is on Zithromax 500 mg every 24 hourly and Rocephin 1 g every 24 hourly. Chest x-ray showing no definite infiltration antibiotic and be changed to by mouth. Continue O2 to keep saturation 93% or above. Patient has history of chronic smoking and advised her to quit smoking. PFT at later date.   Add Acapella, add Mucinex    Acute for this consultation        tronically signed by Grecia Alfonso MD, FCCP on 3/17/2018 at 3:14 PM

## 2018-03-17 NOTE — PROGRESS NOTES
Pulmonary Disorder  (acute or chronic)  []   Severe or Chronic w/ Exacerbation  []     Surgical Status No [x]   Surgeries     General []   Surgery Lower []   Abdominal Thoracic or []   Upper Abdominal Thoracic with  PulmonaryDisorder  []     Chest X-ray Clear/Not  Ordered     [x]  Chronic Changes  Results Pending  []  Infiltrates, atelectasis, pleural effusion, or edema  []  Infiltrates in more than one lobe []  Infiltrate + Atelectasis, &/or pleural effusion  []    Respiratory Pattern Regular,  RR = 12-20 [x]  Increased,  RR = 21-25 []  FELICIANO, irregular,  or RR = 26-30 []  Decreased FEV1  or RR = 31-35 []  Severe SOB, use  of accessory muscles, or RR ? 35  []    Mental Status Alert, oriented,  Cooperative [x]  Confused but Follows commands []  Lethargic or unable to follow commands []  Obtunded  []  Comatose  []    Breath Sounds Clear to  auscultation  []  Decreased unilaterally or  in bases only []  Decreased  bilaterally  [x]  Crackles or intermittent wheezes []  Wheezes []    Cough Strong, Spontan., & nonproductive [x]  Strong,  spontaneous, &  productive []  Weak,  Nonproductive []  Weak, productive or  with wheezes []  No spontaneous  cough or may require suctioning []    Level of Activity Ambulatory []  Ambulatory w/ Assist  [x]  Non-ambulatory []  Paraplegic []  Quadriplegic []    Total    Score:___5____     Triage Score:___5_____      Tri       Triage:     1. (>20) Freq: Q3    2. (16-20) Freq: Q4   3. (11-15) Freq: QID & Albuterol Q2 PRN    4. (6-10) Freq: TID & Albuterol Q2 PRN    5. (0-5) Freq Q4prn

## 2018-03-18 LAB
BASOPHILS ABSOLUTE: 0 K/UL (ref 0–0.2)
BASOPHILS RELATIVE PERCENT: 0.2 %
EOSINOPHILS ABSOLUTE: 0 K/UL (ref 0–0.7)
EOSINOPHILS RELATIVE PERCENT: 0 %
GLUCOSE BLD-MCNC: 244 MG/DL (ref 60–115)
GLUCOSE BLD-MCNC: 273 MG/DL (ref 60–115)
GLUCOSE BLD-MCNC: 292 MG/DL (ref 60–115)
GLUCOSE BLD-MCNC: 312 MG/DL (ref 60–115)
HCT VFR BLD CALC: 35.5 % (ref 37–47)
HEMOGLOBIN: 12.1 G/DL (ref 12–16)
LYMPHOCYTES ABSOLUTE: 1.6 K/UL (ref 1–4.8)
LYMPHOCYTES RELATIVE PERCENT: 10.4 %
MCH RBC QN AUTO: 29.5 PG (ref 27–31.3)
MCHC RBC AUTO-ENTMCNC: 34 % (ref 33–37)
MCV RBC AUTO: 86.9 FL (ref 82–100)
MONOCYTES ABSOLUTE: 0.4 K/UL (ref 0.2–0.8)
MONOCYTES RELATIVE PERCENT: 2.5 %
NEUTROPHILS ABSOLUTE: 13.5 K/UL (ref 1.4–6.5)
NEUTROPHILS RELATIVE PERCENT: 86.9 %
PDW BLD-RTO: 13.5 % (ref 11.5–14.5)
PERFORMED ON: ABNORMAL
PLATELET # BLD: 242 K/UL (ref 130–400)
RBC # BLD: 4.09 M/UL (ref 4.2–5.4)
WBC # BLD: 15.6 K/UL (ref 4.8–10.8)

## 2018-03-18 PROCEDURE — 94640 AIRWAY INHALATION TREATMENT: CPT

## 2018-03-18 PROCEDURE — 1210000000 HC MED SURG R&B

## 2018-03-18 PROCEDURE — 2580000003 HC RX 258: Performed by: NURSE PRACTITIONER

## 2018-03-18 PROCEDURE — 94760 N-INVAS EAR/PLS OXIMETRY 1: CPT

## 2018-03-18 PROCEDURE — 36415 COLL VENOUS BLD VENIPUNCTURE: CPT

## 2018-03-18 PROCEDURE — 6360000002 HC RX W HCPCS: Performed by: INTERNAL MEDICINE

## 2018-03-18 PROCEDURE — 85025 COMPLETE CBC W/AUTO DIFF WBC: CPT

## 2018-03-18 PROCEDURE — 2700000000 HC OXYGEN THERAPY PER DAY

## 2018-03-18 PROCEDURE — 99232 SBSQ HOSP IP/OBS MODERATE 35: CPT | Performed by: INTERNAL MEDICINE

## 2018-03-18 PROCEDURE — 6370000000 HC RX 637 (ALT 250 FOR IP): Performed by: INTERNAL MEDICINE

## 2018-03-18 PROCEDURE — 6360000002 HC RX W HCPCS: Performed by: NURSE PRACTITIONER

## 2018-03-18 RX ADMIN — METHYLPREDNISOLONE SODIUM SUCCINATE 40 MG: 40 INJECTION, POWDER, FOR SOLUTION INTRAMUSCULAR; INTRAVENOUS at 15:32

## 2018-03-18 RX ADMIN — METHYLPREDNISOLONE SODIUM SUCCINATE 40 MG: 40 INJECTION, POWDER, FOR SOLUTION INTRAMUSCULAR; INTRAVENOUS at 08:37

## 2018-03-18 RX ADMIN — METFORMIN HYDROCHLORIDE 850 MG: 850 TABLET, FILM COATED ORAL at 17:01

## 2018-03-18 RX ADMIN — INSULIN LISPRO 3 UNITS: 100 INJECTION, SOLUTION INTRAVENOUS; SUBCUTANEOUS at 12:06

## 2018-03-18 RX ADMIN — METHYLPREDNISOLONE SODIUM SUCCINATE 40 MG: 40 INJECTION, POWDER, FOR SOLUTION INTRAMUSCULAR; INTRAVENOUS at 03:22

## 2018-03-18 RX ADMIN — IPRATROPIUM BROMIDE AND ALBUTEROL SULFATE 1 AMPULE: .5; 3 SOLUTION RESPIRATORY (INHALATION) at 20:09

## 2018-03-18 RX ADMIN — IPRATROPIUM BROMIDE AND ALBUTEROL SULFATE 1 AMPULE: .5; 3 SOLUTION RESPIRATORY (INHALATION) at 06:02

## 2018-03-18 RX ADMIN — CEFTRIAXONE 1 G: 1 INJECTION, POWDER, FOR SOLUTION INTRAMUSCULAR; INTRAVENOUS at 15:32

## 2018-03-18 RX ADMIN — Medication 10 ML: at 08:37

## 2018-03-18 RX ADMIN — METHYLPREDNISOLONE SODIUM SUCCINATE 40 MG: 40 INJECTION, POWDER, FOR SOLUTION INTRAMUSCULAR; INTRAVENOUS at 21:09

## 2018-03-18 RX ADMIN — INSULIN LISPRO 4 UNITS: 100 INJECTION, SOLUTION INTRAVENOUS; SUBCUTANEOUS at 18:04

## 2018-03-18 RX ADMIN — ENOXAPARIN SODIUM 40 MG: 40 INJECTION SUBCUTANEOUS at 08:34

## 2018-03-18 RX ADMIN — IPRATROPIUM BROMIDE AND ALBUTEROL SULFATE 1 AMPULE: .5; 3 SOLUTION RESPIRATORY (INHALATION) at 13:52

## 2018-03-18 RX ADMIN — AZITHROMYCIN MONOHYDRATE 500 MG: 500 INJECTION, POWDER, LYOPHILIZED, FOR SOLUTION INTRAVENOUS at 16:30

## 2018-03-18 RX ADMIN — INSULIN LISPRO 2 UNITS: 100 INJECTION, SOLUTION INTRAVENOUS; SUBCUTANEOUS at 08:35

## 2018-03-18 ASSESSMENT — PAIN SCALES - GENERAL: PAINLEVEL_OUTOF10: 0

## 2018-03-18 ASSESSMENT — ENCOUNTER SYMPTOMS
SHORTNESS OF BREATH: 1
COUGH: 1
NAUSEA: 0

## 2018-03-18 NOTE — PROGRESS NOTES
Jesus Mon is a 59 y.o. female patient.     Current Facility-Administered Medications   Medication Dose Route Frequency Provider Last Rate Last Dose    metFORMIN (GLUCOPHAGE) tablet 850 mg  850 mg Oral BID Barbie Smith MD        methylPREDNISolone sodium (SOLU-MEDROL) injection 40 mg  40 mg Intravenous Q6H Barbie Smith MD   40 mg at 03/18/18 1532    ipratropium-albuterol (DUONEB) nebulizer solution 1 ampule  1 ampule Inhalation Q6H Barbie Smith MD   1 ampule at 03/18/18 1352    sodium chloride flush 0.9 % injection 10 mL  10 mL Intravenous 2 times per day ADRIAN Helm   10 mL at 03/18/18 0837    sodium chloride flush 0.9 % injection 10 mL  10 mL Intravenous PRN ADRIAN Helm        acetaminophen (TYLENOL) tablet 650 mg  650 mg Oral Q4H PRN ADRIAN Helm        magnesium hydroxide (MILK OF MAGNESIA) 400 MG/5ML suspension 30 mL  30 mL Oral Daily PRN ADRIAN Helm        ondansetron [facility]) injection 4 mg  4 mg Intravenous Q6H PRN ADRIAN Helm        enoxaparin (LOVENOX) injection 40 mg  40 mg Subcutaneous Daily ADRIAN Helm   40 mg at 03/18/18 0834    albuterol (PROVENTIL) nebulizer solution 2.5 mg  2.5 mg Nebulization Q2H PRN ADRIAN Helm        azithromycin (ZITHROMAX) 500 mg in D5W 250ml addavial  500 mg Intravenous Q24H ADRIAN Helm   Stopped at 03/17/18 1744    cefTRIAXone (ROCEPHIN) 1 g IVPB in 50 mL D5W minibag  1 g Intravenous Q24H ADRIAN Helm 100 mL/hr at 03/18/18 1532 1 g at 03/18/18 1532    glucose (GLUTOSE) 40 % oral gel 15 g  15 g Oral PRN ADRIAN Helm        dextrose 50 % solution 12.5 g  12.5 g Intravenous PRN ADRIAN Helm        glucagon (rDNA) injection 1 mg  1 mg Intramuscular PRN ADRIAN Helm        dextrose 5 % solution  100 mL/hr Intravenous PRN Kristie Taco, APRN        insulin lispro (HUMALOG) injection vial 0-6 Units  0-6 Units Subcutaneous TID FAUSTINO Funk Jose De Jesus Aguilar, APRN   3 Units at 03/18/18 1206    insulin lispro (HUMALOG) injection vial 0-3 Units  0-3 Units Subcutaneous Nightly Leticia Martinez, APRN   1 Units at 03/17/18 2119     Allergies   Allergen Reactions    Norco [Hydrocodone-Acetaminophen]      Active Problems:    Acute respiratory failure (HCC)    Respiratory failure ([address]  Resolved Problems:    * No resolved hospital problems. *    Blood pressure 112/61, pulse 81, temperature 98.8 °F (37.1 °C), resp. rate 20, height 5' 5\" (1.651 m), weight 208 lb 15.9 oz (94.8 kg), SpO2 97 %, not currently breastfeeding. Subjective:  Symptoms:  Improved. She reports shortness of breath, cough and weakness. No anxiety. Diet:  Adequate intake. No nausea. Activity level: Impaired due to weakness. Pain:  She reports no pain. Objective:  General Appearance:  Ill-appearing and in no acute distress. Vital signs: (most recent): Blood pressure 112/61, pulse 81, temperature 98.8 °F (37.1 °C), resp. rate 20, height 5' 5\" (1.651 m), weight 208 lb 15.9 oz (94.8 kg), SpO2 97 %, not currently breastfeeding. Vital signs are normal.    Lungs:  Normal effort and normal respiratory rate. There are rales, decreased breath sounds and wheezes. Heart: S1 normal and S2 normal.  No gallop. Abdomen: Abdomen is soft. There is no abdominal tenderness. Extremities: There is no dependent edema. Pulses: Distal pulses are intact. Neurological: Patient is alert and oriented to person, place and time.       Assessment & Plan   Acute copd exacerbation improving  Continue doneb/solumedrol/antibiotics/oxygen therapy      Dawson Arenas MD  3/18/2018

## 2018-03-18 NOTE — PROGRESS NOTES
--    ALKPHOS  94   --    AST  24   --    ALT  22   --    LABGLOM  >60.0  >60.0   GFRAA  >60.0  >60.0   GLOB  2.6   --          O2 Device: Nasal cannula  O2 Flow Rate (L/min): 2 L/min    DIET CARB CONTROL;     MEDICATIONS during current hospitalization:    Continuous Infusions:   dextrose         Scheduled Meds:   metFORMIN  850 mg Oral BID    methylPREDNISolone  40 mg Intravenous Q6H    ipratropium-albuterol  1 ampule Inhalation Q6H    sodium chloride flush  10 mL Intravenous 2 times per day    enoxaparin  40 mg Subcutaneous Daily    azithromycin  500 mg Intravenous Q24H    cefTRIAXone (ROCEPHIN) IV  1 g Intravenous Q24H    insulin lispro  0-6 Units Subcutaneous TID WC    insulin lispro  0-3 Units Subcutaneous Nightly       PRN Meds:sodium chloride flush, acetaminophen, magnesium hydroxide, ondansetron, albuterol, glucose, dextrose, glucagon (rDNA), dextrose    Radiology  Xr Chest Portable    Result Date: 3/16/2018  Chest one view. HISTORY: Cardiopulmonary evaluation. FINDINGS: Comparison, December 3, 2013. Osseous structures intact. Cardiopericardial silhouette is normal. Pulmonary vasculature is normal. Lungs are clear. No acute cardiopulmonary disease. IMPRESSION AND SUGGESTION:  1. Acute COPD exacerbation secondary to bronchitis  2. Chronic smoking with nicotine dependency  3. Hypertension  4. Diverticulitis mellitus  5. Hyperlipidemia     Patient is currently on nebulizer with DuoNeb 4 times a day and albuterol every 2 hours when necessary . She is on Solu-Medrol 40 mg every 6 hourly. She is on Zithromax 500 mg every 24 hourly and Rocephin 1 g every 24 hourly. Chest x-ray showing no definite infiltration  Continue O2 to keep saturation 93% or above. continue present treatment plan.         Electronically signed by Hugo Hightower MD, FCCP on 3/18/2018 at 3:19 PM

## 2018-03-18 NOTE — PROGRESS NOTES
Pt is alert and oriented. Lungs sound of rhonchi. She is on 2 liters nc. She ambulates with steady gait. Pt denies any needs at this time.  Electronically signed by Elbert Cruz RN on 3/18/2018 at 2:35 PM

## 2018-03-19 VITALS
BODY MASS INDEX: 34.82 KG/M2 | RESPIRATION RATE: 18 BRPM | TEMPERATURE: 97.9 F | WEIGHT: 209 LBS | HEART RATE: 73 BPM | SYSTOLIC BLOOD PRESSURE: 126 MMHG | HEIGHT: 65 IN | OXYGEN SATURATION: 91 % | DIASTOLIC BLOOD PRESSURE: 68 MMHG

## 2018-03-19 LAB
GLUCOSE BLD-MCNC: 217 MG/DL (ref 60–115)
GLUCOSE BLD-MCNC: 270 MG/DL (ref 60–115)
GLUCOSE BLD-MCNC: 276 MG/DL (ref 60–115)
PERFORMED ON: ABNORMAL

## 2018-03-19 PROCEDURE — 6360000002 HC RX W HCPCS: Performed by: INTERNAL MEDICINE

## 2018-03-19 PROCEDURE — 6360000002 HC RX W HCPCS: Performed by: NURSE PRACTITIONER

## 2018-03-19 PROCEDURE — 99232 SBSQ HOSP IP/OBS MODERATE 35: CPT | Performed by: INTERNAL MEDICINE

## 2018-03-19 PROCEDURE — 2580000003 HC RX 258: Performed by: NURSE PRACTITIONER

## 2018-03-19 PROCEDURE — 94640 AIRWAY INHALATION TREATMENT: CPT

## 2018-03-19 PROCEDURE — 6370000000 HC RX 637 (ALT 250 FOR IP): Performed by: NURSE PRACTITIONER

## 2018-03-19 PROCEDURE — APPSS30 APP SPLIT SHARED TIME 16-30 MINUTES: Performed by: PHYSICIAN ASSISTANT

## 2018-03-19 PROCEDURE — 6370000000 HC RX 637 (ALT 250 FOR IP): Performed by: INTERNAL MEDICINE

## 2018-03-19 PROCEDURE — 2700000000 HC OXYGEN THERAPY PER DAY

## 2018-03-19 RX ORDER — NICOTINE 21 MG/24HR
1 PATCH, TRANSDERMAL 24 HOURS TRANSDERMAL DAILY
Status: DISCONTINUED | OUTPATIENT
Start: 2018-03-19 | End: 2018-03-19 | Stop reason: HOSPADM

## 2018-03-19 RX ORDER — PREDNISONE 20 MG/1
40 TABLET ORAL DAILY
Status: DISCONTINUED | OUTPATIENT
Start: 2018-03-20 | End: 2018-03-19 | Stop reason: HOSPADM

## 2018-03-19 RX ORDER — PREDNISONE 10 MG/1
TABLET ORAL
Qty: 40 TABLET | Refills: 0 | Status: SHIPPED | OUTPATIENT
Start: 2018-03-19 | End: 2018-04-06 | Stop reason: ALTCHOICE

## 2018-03-19 RX ORDER — NICOTINE 21 MG/24HR
1 PATCH, TRANSDERMAL 24 HOURS TRANSDERMAL EVERY 24 HOURS
Qty: 30 PATCH | Refills: 0 | Status: SHIPPED | OUTPATIENT
Start: 2018-03-19 | End: 2018-04-12 | Stop reason: SDUPTHER

## 2018-03-19 RX ADMIN — INSULIN LISPRO 3 UNITS: 100 INJECTION, SOLUTION INTRAVENOUS; SUBCUTANEOUS at 13:09

## 2018-03-19 RX ADMIN — INSULIN LISPRO 3 UNITS: 100 INJECTION, SOLUTION INTRAVENOUS; SUBCUTANEOUS at 09:57

## 2018-03-19 RX ADMIN — Medication 10 ML: at 03:52

## 2018-03-19 RX ADMIN — IPRATROPIUM BROMIDE AND ALBUTEROL SULFATE 1 AMPULE: .5; 3 SOLUTION RESPIRATORY (INHALATION) at 07:17

## 2018-03-19 RX ADMIN — METHYLPREDNISOLONE SODIUM SUCCINATE 40 MG: 40 INJECTION, POWDER, FOR SOLUTION INTRAMUSCULAR; INTRAVENOUS at 03:50

## 2018-03-19 RX ADMIN — INSULIN LISPRO 2 UNITS: 100 INJECTION, SOLUTION INTRAVENOUS; SUBCUTANEOUS at 18:00

## 2018-03-19 RX ADMIN — METHYLPREDNISOLONE SODIUM SUCCINATE 40 MG: 40 INJECTION, POWDER, FOR SOLUTION INTRAMUSCULAR; INTRAVENOUS at 09:57

## 2018-03-19 RX ADMIN — Medication 10 ML: at 10:02

## 2018-03-19 RX ADMIN — METFORMIN HYDROCHLORIDE 850 MG: 850 TABLET, FILM COATED ORAL at 09:57

## 2018-03-19 RX ADMIN — ACETAMINOPHEN 650 MG: 325 TABLET ORAL at 12:30

## 2018-03-19 RX ADMIN — ENOXAPARIN SODIUM 40 MG: 40 INJECTION SUBCUTANEOUS at 09:56

## 2018-03-19 ASSESSMENT — PAIN SCALES - GENERAL
PAINLEVEL_OUTOF10: 3
PAINLEVEL_OUTOF10: 0

## 2018-03-19 NOTE — PROGRESS NOTES
seconds   Peripheral Pulses: +2 palpable, equal bilaterally     Labs:   Recent Labs      03/16/18   1515  03/17/18   0713  03/18/18   0653   WBC  10.1  14.0*  15.6*   HGB  14.0  12.8  12.1   HCT  40.5  36.9*  35.5*   PLT  230  237  242     Recent Labs      03/16/18   1515  03/17/18   0713   NA  142  140   K  3.7  4.5   CL  102  100   CO2  24  26   BUN  10  15   CREATININE  0.39*  0.39*   CALCIUM  8.8  9.3     Recent Labs      03/16/18   1515   AST  24   ALT  22   BILITOT  0.6   ALKPHOS  94     Recent Labs      03/16/18   1515   INR  1.0     No results for input(s): CKTOTAL, TROPONINI in the last 72 hours. Urinalysis:    Lab Results   Component Value Date    NITRU Negative 03/16/2018    WBCUA 6-10 03/16/2018    BACTERIA Few 03/16/2018    RBCUA 10-20 03/16/2018    BLOODU MODERATE 03/16/2018    SPECGRAV 1.015 03/16/2018    GLUCOSEU Negative 03/16/2018       Radiology:  XR CHEST PORTABLE   Final Result      No acute cardiopulmonary disease. Assessment/Plan:    #Acute hypoxic respiratory failure secondary to COPD exacerbation likely triggered by influenza      - Will stop IV CTX as there is no PNA; can continue azithromycin      - Respiratory viral panel was not collected; will d/c as we are out of the window where tamiflu would be helpful      - Continue steroids and bronchopulmonary hygiene per pulmonology; ok for discharge home when ok with pulmonologist    #Nicotine abuse      - Counseled patient today again; discussed with daughter who will be removing her lighter and ashtrays    #HTN     - Continue home meds    #DMII     - Continue metformin and SSI    Active Hospital Problems    Diagnosis Date Noted    Respiratory failure ([address] [J96.90] 03/17/2018    Acute respiratory failure ([facility] [J96.00] 03/16/2018     Additional work up or/and treatment plan may be added today or then after based on clinical progression. I am managing a portion of pt care. Some medical issues are handled by other specialists.

## 2018-03-20 ENCOUNTER — TELEPHONE (OUTPATIENT)
Dept: FAMILY MEDICINE CLINIC | Age: 58
End: 2018-03-20

## 2018-03-20 ENCOUNTER — CARE COORDINATION (OUTPATIENT)
Dept: CASE MANAGEMENT | Age: 58
End: 2018-03-20

## 2018-03-21 LAB
BLOOD CULTURE, ROUTINE: NORMAL
CULTURE, BLOOD 2: NORMAL

## 2018-03-26 ENCOUNTER — OFFICE VISIT (OUTPATIENT)
Dept: FAMILY MEDICINE CLINIC | Age: 58
End: 2018-03-26
Payer: COMMERCIAL

## 2018-03-26 VITALS
TEMPERATURE: 97.2 F | HEIGHT: 65 IN | WEIGHT: 201 LBS | RESPIRATION RATE: 16 BRPM | BODY MASS INDEX: 33.49 KG/M2 | SYSTOLIC BLOOD PRESSURE: 132 MMHG | HEART RATE: 88 BPM | OXYGEN SATURATION: 97 % | DIASTOLIC BLOOD PRESSURE: 86 MMHG

## 2018-03-26 DIAGNOSIS — J96.02 ACUTE RESPIRATORY FAILURE WITH HYPERCAPNIA (HCC): ICD-10-CM

## 2018-03-26 DIAGNOSIS — J44.1 COPD WITH ACUTE EXACERBATION (HCC): Primary | ICD-10-CM

## 2018-03-26 PROCEDURE — 99495 TRANSJ CARE MGMT MOD F2F 14D: CPT | Performed by: NURSE PRACTITIONER

## 2018-03-26 PROCEDURE — 1111F DSCHRG MED/CURRENT MED MERGE: CPT | Performed by: NURSE PRACTITIONER

## 2018-03-26 RX ORDER — BENZONATATE 200 MG/1
200 CAPSULE ORAL 3 TIMES DAILY PRN
Qty: 30 CAPSULE | Refills: 0 | Status: SHIPPED | OUTPATIENT
Start: 2018-03-26 | End: 2018-04-05

## 2018-03-26 ASSESSMENT — ENCOUNTER SYMPTOMS
DIARRHEA: 0
COUGH: 1
BLOOD IN STOOL: 0
EYES NEGATIVE: 1
WHEEZING: 0
ABDOMINAL PAIN: 0
VOICE CHANGE: 0
GASTROINTESTINAL NEGATIVE: 1
CONSTIPATION: 0
RECTAL PAIN: 0
COLOR CHANGE: 0
TROUBLE SWALLOWING: 0
ANAL BLEEDING: 0
ALLERGIC/IMMUNOLOGIC NEGATIVE: 1
SHORTNESS OF BREATH: 0

## 2018-03-26 ASSESSMENT — PATIENT HEALTH QUESTIONNAIRE - PHQ9
2. FEELING DOWN, DEPRESSED OR HOPELESS: 0
1. LITTLE INTEREST OR PLEASURE IN DOING THINGS: 0
SUM OF ALL RESPONSES TO PHQ QUESTIONS 1-9: 0
SUM OF ALL RESPONSES TO PHQ9 QUESTIONS 1 & 2: 0

## 2018-03-26 NOTE — PROGRESS NOTES
Post-Discharge Transitional Care Management Services      Melanie Morillo   YOB: 1960    Date of Office Visit:  3/26/2018  Date of Hospital Admission: 3/16/18  Date of Hospital Discharge: 3/19/18  [facility] Risk Score [risk of hospital readmission >=10  medium risk (chance of readmission ~ 12%) >14  high risk (chance of readmission ~18%)]:Risk Score: 3.75    Care management risk score Rising risk (score 2-5) and Complex Care (Scores >=6): 6       Patient Active Problem List   Diagnosis    HTN (hypertension)    Obesity    Tubular adenoma of colon    Type 2 diabetes mellitus without complication ([address]    Mixed hyperlipidemia    DDD (degenerative disc disease), lumbosacral    Lumbar canal stenosis    Herniated lumbar intervertebral disc    Facet hypertrophy of lumbar region    Osteoarthritis of spine with radiculopathy, lumbar region    Lumbosacral spondylosis without myelopathy    Acute respiratory failure ([address]    Respiratory failure ([address]    COPD exacerbation ([address]       Allergies   Allergen Reactions    Norco [Hydrocodone-Acetaminophen]        Medications listed as ordered at the time of discharge from hospital   Jessika Dior   Home Medication Instructions RADHA:    Printed on:03/26/18 6326   Medication Information                      albuterol-ipratropium (COMBIVENT RESPIMAT)  MCG/ACT AERS inhaler  Inhale 1 puff into the lungs every 4 hours as needed for Wheezing or Shortness of Breath             amLODIPine (NORVASC) 2.5 MG tablet  Take 1 tablet by mouth daily             atorvastatin (LIPITOR) 40 MG tablet  Take 1 tablet by mouth daily             benzonatate (TESSALON) 200 MG capsule  Take 1 capsule by mouth 3 times daily as needed for Cough             Handicap Placard MISC  by Does not apply route             ibuprofen (ADVIL;MOTRIN) 800 MG tablet  TAKE 1 TABLET BY MOUTH EVERY 8 HOURS AS NEEDED FOR PAIN             metFORMIN (GLUCOPHAGE) 500 MG tablet  Take 1 tablet by and well-nourished. No distress. HENT:   Head: Normocephalic and atraumatic. Right Ear: Tympanic membrane, external ear and ear canal normal.   Left Ear: Tympanic membrane, external ear and ear canal normal.   Nose: Nose normal.   Mouth/Throat: Uvula is midline, oropharynx is clear and moist and mucous membranes are normal.   Eyes: Conjunctivae are normal. Pupils are equal, round, and reactive to light. Neck: Neck supple. No JVD present. Cardiovascular: Normal rate, regular rhythm, normal heart sounds and intact distal pulses. No murmur heard. Pulmonary/Chest: Effort normal and breath sounds normal. No respiratory distress. She has no wheezes. She has no rales. Abdominal: Soft. Bowel sounds are normal. She exhibits no distension and no mass. There is no tenderness. Neurological: She is alert and oriented to person, place, and time. Skin: Skin is warm and dry. Assessment/Plan:  Juan Pablo Jara was seen today for follow-up from hospital.    Diagnoses and all orders for this visit:    COPD with acute exacerbation ([address]  -     [place] DISCHARGE MEDS RECONCILED W/ CURRENT OUTPATIENT MED LIST    Acute respiratory failure with hypercapnia ([address]  -     [place] DISCHARGE MEDS RECONCILED W/ CURRENT OUTPATIENT MED LIST    Other orders  -     benzonatate (TESSALON) 200 MG capsule;  Take 1 capsule by mouth 3 times daily as needed for Cough          Medical Decision Making: moderate complexity     Electronically signed by:  Michelle Bangura, MSN, FNP-C 3/26/18 2:43 PM

## 2018-03-28 ENCOUNTER — OFFICE VISIT (OUTPATIENT)
Dept: PULMONOLOGY | Age: 58
End: 2018-03-28
Payer: COMMERCIAL

## 2018-03-28 VITALS
TEMPERATURE: 96.8 F | DIASTOLIC BLOOD PRESSURE: 70 MMHG | BODY MASS INDEX: 35.32 KG/M2 | HEIGHT: 65 IN | HEART RATE: 86 BPM | SYSTOLIC BLOOD PRESSURE: 118 MMHG | WEIGHT: 212 LBS | OXYGEN SATURATION: 96 %

## 2018-03-28 DIAGNOSIS — F17.211 NICOTINE DEPENDENCE, CIGARETTES, IN REMISSION: ICD-10-CM

## 2018-03-28 DIAGNOSIS — J44.9 CHRONIC OBSTRUCTIVE PULMONARY DISEASE, UNSPECIFIED COPD TYPE (HCC): Primary | ICD-10-CM

## 2018-03-28 DIAGNOSIS — Z71.6 TOBACCO ABUSE COUNSELING: ICD-10-CM

## 2018-03-28 DIAGNOSIS — G47.33 OSA (OBSTRUCTIVE SLEEP APNEA): ICD-10-CM

## 2018-03-28 PROCEDURE — G8926 SPIRO NO PERF OR DOC: HCPCS | Performed by: PHYSICIAN ASSISTANT

## 2018-03-28 PROCEDURE — 3023F SPIROM DOC REV: CPT | Performed by: PHYSICIAN ASSISTANT

## 2018-03-28 PROCEDURE — G0296 VISIT TO DETERM LDCT ELIG: HCPCS | Performed by: PHYSICIAN ASSISTANT

## 2018-03-28 PROCEDURE — 3014F SCREEN MAMMO DOC REV: CPT | Performed by: PHYSICIAN ASSISTANT

## 2018-03-28 PROCEDURE — 99214 OFFICE O/P EST MOD 30 MIN: CPT | Performed by: PHYSICIAN ASSISTANT

## 2018-03-28 PROCEDURE — 4004F PT TOBACCO SCREEN RCVD TLK: CPT | Performed by: PHYSICIAN ASSISTANT

## 2018-03-28 PROCEDURE — 1111F DSCHRG MED/CURRENT MED MERGE: CPT | Performed by: PHYSICIAN ASSISTANT

## 2018-03-28 PROCEDURE — G8417 CALC BMI ABV UP PARAM F/U: HCPCS | Performed by: PHYSICIAN ASSISTANT

## 2018-03-28 PROCEDURE — G8482 FLU IMMUNIZE ORDER/ADMIN: HCPCS | Performed by: PHYSICIAN ASSISTANT

## 2018-03-28 PROCEDURE — G8427 DOCREV CUR MEDS BY ELIG CLIN: HCPCS | Performed by: PHYSICIAN ASSISTANT

## 2018-03-28 PROCEDURE — 3017F COLORECTAL CA SCREEN DOC REV: CPT | Performed by: PHYSICIAN ASSISTANT

## 2018-03-28 ASSESSMENT — ENCOUNTER SYMPTOMS
VOICE CHANGE: 0
COUGH: 0
BACK PAIN: 0
ABDOMINAL PAIN: 0
TROUBLE SWALLOWING: 0
SINUS PAIN: 0
CHEST TIGHTNESS: 0
SINUS PRESSURE: 0
SHORTNESS OF BREATH: 0
RHINORRHEA: 0
SORE THROAT: 0
STRIDOR: 0
WHEEZING: 0

## 2018-03-28 NOTE — PROGRESS NOTES
Subjective     Jessika Reynaga 59 y.o. female presents 3/28/18 with   Chief Complaint   Patient presents with   [address] from [facility]     pt here for f/u from hospital for copd       HPI  This is a 13-year-old  female patient that was under the care of the pulmonary service when she was admitted to the hospital and discharged just last week on . Patient is still on steroid therapy and reports she's has some concerns about her blood sugars. Patient is doing well with smoking sensation and has not smoked since hospital discharge. She has been using nicotine patches. Patient does have significant smoking history since she was about 11years old and this would qualify her for our CT lung screening program.  In addition discussed with patient it was recommended she proceed with an echocardiogram, pulmonary function test, and sleep study to help optimize her treatment. Patient is currently using Combivent and reports good relief with this. Patient denies any chest pain, shortness of breath cough or sputum production. Overall she feels that her condition has significantly improved since hospital discharge.     Reviewed the following history:    Past Medical History:   Diagnosis Date    Benign neoplasm of colon 2015    DR Thuan Cote    Hyperlipidemia     Hypertension     Type II or unspecified type diabetes mellitus without mention of complication, not stated as uncontrolled      Past Surgical History:   Procedure Laterality Date     SECTION      2    COLONOSCOPY  2015    DR Thuan Cote - POLYPS needs 2018    FACIAL RECONSTRUCTION SURGERY Bilateral 10/16/2017    EXCISION AND LAYERED PLASTIC CLOSURE INCLUSION CYST RIGHT EARLOBE AND LEFT CHEEK performed by Ferman Schilder, MD at [address] [place] [address] Right 2016    RIGHT L3-4 L4-5 L5-S1 LAMINECTOMY RIGHT L4-5 MICRODISKECTOMY performed by Claudia Whitman MD at [facility]     Family History   Problem Relation Age of Onset    patient's lifetime without the screening. Need for follow up screens annually to continue lung cancer screening effectiveness     Risks associated with radiation from annual LDCT- Radiation exposure is about the same as for a mammogram, which is about 1/3 of the annual background radiation exposure from everyday life. Starting screening at age 54 is not likely to increase cancer risk from radiation exposure. Patients with comorbidities resulting in life expectancy of < 10 years, or that would preclude treatment of an abnormality identified on CT, should not be screened due to lack of benefit. To obtain maximal benefit from this screening, smoking cessation and long-term abstinence from smoking is critical      Return in about 6 weeks (around 5/9/2018) for re-evaluation, after PFT's, after sleep study, after CT Scan.     Mami Almazan PA-C

## 2018-03-30 ENCOUNTER — TELEPHONE (OUTPATIENT)
Dept: FAMILY MEDICINE CLINIC | Age: 58
End: 2018-03-30

## 2018-03-30 RX ORDER — IBUPROFEN 800 MG/1
TABLET ORAL
Qty: 200 TABLET | Refills: 0 | Status: SHIPPED | OUTPATIENT
Start: 2018-03-30 | End: 2018-06-01 | Stop reason: SDUPTHER

## 2018-04-06 ENCOUNTER — TELEPHONE (OUTPATIENT)
Dept: CASE MANAGEMENT | Age: 58
End: 2018-04-06

## 2018-04-06 ENCOUNTER — OFFICE VISIT (OUTPATIENT)
Dept: FAMILY MEDICINE CLINIC | Age: 58
End: 2018-04-06
Payer: COMMERCIAL

## 2018-04-06 VITALS
HEIGHT: 65 IN | RESPIRATION RATE: 16 BRPM | SYSTOLIC BLOOD PRESSURE: 126 MMHG | TEMPERATURE: 98.3 F | BODY MASS INDEX: 33.15 KG/M2 | HEART RATE: 68 BPM | WEIGHT: 199 LBS | OXYGEN SATURATION: 98 % | DIASTOLIC BLOOD PRESSURE: 70 MMHG

## 2018-04-06 DIAGNOSIS — J44.9 CHRONIC OBSTRUCTIVE PULMONARY DISEASE, UNSPECIFIED COPD TYPE (HCC): ICD-10-CM

## 2018-04-06 DIAGNOSIS — E78.2 MIXED HYPERLIPIDEMIA: ICD-10-CM

## 2018-04-06 DIAGNOSIS — R35.0 URINARY FREQUENCY: ICD-10-CM

## 2018-04-06 DIAGNOSIS — I10 ESSENTIAL HYPERTENSION: ICD-10-CM

## 2018-04-06 DIAGNOSIS — E11.9 TYPE 2 DIABETES MELLITUS WITHOUT COMPLICATION, WITHOUT LONG-TERM CURRENT USE OF INSULIN (HCC): Primary | ICD-10-CM

## 2018-04-06 LAB
BILIRUBIN, POC: ABNORMAL
BLOOD URINE, POC: ABNORMAL
CLARITY, POC: ABNORMAL
COLOR, POC: ABNORMAL
GLUCOSE URINE, POC: ABNORMAL
KETONES, POC: ABNORMAL
LEUKOCYTE EST, POC: ABNORMAL
NITRITE, POC: ABNORMAL
PH, POC: 5.5
PROTEIN, POC: ABNORMAL
SPECIFIC GRAVITY, POC: 1.01
UROBILINOGEN, POC: ABNORMAL

## 2018-04-06 PROCEDURE — 3014F SCREEN MAMMO DOC REV: CPT | Performed by: FAMILY MEDICINE

## 2018-04-06 PROCEDURE — 81003 URINALYSIS AUTO W/O SCOPE: CPT | Performed by: FAMILY MEDICINE

## 2018-04-06 PROCEDURE — 99214 OFFICE O/P EST MOD 30 MIN: CPT | Performed by: FAMILY MEDICINE

## 2018-04-06 PROCEDURE — 3023F SPIROM DOC REV: CPT | Performed by: FAMILY MEDICINE

## 2018-04-06 PROCEDURE — 3017F COLORECTAL CA SCREEN DOC REV: CPT | Performed by: FAMILY MEDICINE

## 2018-04-06 PROCEDURE — G8427 DOCREV CUR MEDS BY ELIG CLIN: HCPCS | Performed by: FAMILY MEDICINE

## 2018-04-06 PROCEDURE — G8926 SPIRO NO PERF OR DOC: HCPCS | Performed by: FAMILY MEDICINE

## 2018-04-06 PROCEDURE — 1036F TOBACCO NON-USER: CPT | Performed by: FAMILY MEDICINE

## 2018-04-06 PROCEDURE — G8417 CALC BMI ABV UP PARAM F/U: HCPCS | Performed by: FAMILY MEDICINE

## 2018-04-06 PROCEDURE — 3045F PR MOST RECENT HEMOGLOBIN A1C LEVEL 7.0-9.0%: CPT | Performed by: FAMILY MEDICINE

## 2018-04-06 PROCEDURE — 1111F DSCHRG MED/CURRENT MED MERGE: CPT | Performed by: FAMILY MEDICINE

## 2018-04-08 LAB — URINE CULTURE, ROUTINE: NORMAL

## 2018-04-09 ENCOUNTER — TELEPHONE (OUTPATIENT)
Dept: FAMILY MEDICINE CLINIC | Age: 58
End: 2018-04-09

## 2018-04-10 ENCOUNTER — TELEPHONE (OUTPATIENT)
Dept: FAMILY MEDICINE CLINIC | Age: 58
End: 2018-04-10

## 2018-04-10 RX ORDER — FLUCONAZOLE 100 MG/1
100 TABLET ORAL DAILY
Qty: 5 TABLET | Refills: 0 | Status: SHIPPED | OUTPATIENT
Start: 2018-04-10 | End: 2018-04-15

## 2018-04-11 ENCOUNTER — PATIENT MESSAGE (OUTPATIENT)
Dept: PULMONOLOGY | Age: 58
End: 2018-04-11

## 2018-04-11 DIAGNOSIS — E11.8 TYPE 2 DIABETES MELLITUS WITH COMPLICATION, UNSPECIFIED LONG TERM INSULIN USE STATUS: Primary | ICD-10-CM

## 2018-04-11 DIAGNOSIS — Z72.0 TOBACCO ABUSE: Primary | ICD-10-CM

## 2018-04-12 RX ORDER — NICOTINE 21 MG/24HR
1 PATCH, TRANSDERMAL 24 HOURS TRANSDERMAL EVERY 24 HOURS
Qty: 30 PATCH | Refills: 1 | Status: SHIPPED | OUTPATIENT
Start: 2018-04-12 | End: 2018-06-18 | Stop reason: SDUPTHER

## 2018-04-13 ENCOUNTER — HOSPITAL ENCOUNTER (OUTPATIENT)
Dept: CT IMAGING | Age: 58
Discharge: HOME | End: 2018-04-15
Payer: COMMERCIAL

## 2018-04-13 ENCOUNTER — HOSPITAL ENCOUNTER (OUTPATIENT)
Dept: NON INVASIVE DIAGNOSTICS | Age: 58
Discharge: HOME | End: 2018-04-13
Payer: COMMERCIAL

## 2018-04-13 VITALS
HEART RATE: 74 BPM | WEIGHT: 199 LBS | HEIGHT: 65 IN | BODY MASS INDEX: 33.15 KG/M2 | RESPIRATION RATE: 16 BRPM | DIASTOLIC BLOOD PRESSURE: 78 MMHG | SYSTOLIC BLOOD PRESSURE: 122 MMHG

## 2018-04-13 DIAGNOSIS — F17.211 NICOTINE DEPENDENCE, CIGARETTES, IN REMISSION: ICD-10-CM

## 2018-04-13 LAB
LV EF: 60 %
LVEF MODALITY: NORMAL

## 2018-04-13 PROCEDURE — G0297 LDCT FOR LUNG CA SCREEN: HCPCS

## 2018-04-13 PROCEDURE — C8923 2D TTE W OR W/O FOL W/CON,CO: HCPCS

## 2018-04-13 PROCEDURE — 93306 TTE W/DOPPLER COMPLETE: CPT

## 2018-04-16 ENCOUNTER — HOSPITAL ENCOUNTER (OUTPATIENT)
Dept: SLEEP CENTER | Age: 58
Discharge: HOME | End: 2018-04-18
Payer: COMMERCIAL

## 2018-04-16 PROCEDURE — 95810 POLYSOM 6/> YRS 4/> PARAM: CPT

## 2018-04-26 ENCOUNTER — TELEPHONE (OUTPATIENT)
Dept: PULMONOLOGY | Age: 58
End: 2018-04-26

## 2018-04-26 DIAGNOSIS — Q24.8 PERICARDIAL CYST: Primary | ICD-10-CM

## 2018-04-26 DIAGNOSIS — N20.0 KIDNEY STONE: ICD-10-CM

## 2018-05-01 ENCOUNTER — OFFICE VISIT (OUTPATIENT)
Dept: ENDOCRINOLOGY | Age: 58
End: 2018-05-01
Payer: COMMERCIAL

## 2018-05-01 VITALS
DIASTOLIC BLOOD PRESSURE: 89 MMHG | SYSTOLIC BLOOD PRESSURE: 136 MMHG | WEIGHT: 193 LBS | HEIGHT: 65 IN | HEART RATE: 86 BPM | BODY MASS INDEX: 32.15 KG/M2

## 2018-05-01 DIAGNOSIS — L60.0 INGROWN TOENAIL: ICD-10-CM

## 2018-05-01 DIAGNOSIS — E11.9 TYPE 2 DIABETES MELLITUS WITHOUT COMPLICATION, WITHOUT LONG-TERM CURRENT USE OF INSULIN (HCC): Primary | ICD-10-CM

## 2018-05-01 DIAGNOSIS — B35.1 ONYCHOMYCOSIS: ICD-10-CM

## 2018-05-01 LAB — GLUCOSE BLD-MCNC: 582 MG/DL

## 2018-05-01 PROCEDURE — 2022F DILAT RTA XM EVC RTNOPTHY: CPT | Performed by: INTERNAL MEDICINE

## 2018-05-01 PROCEDURE — G8427 DOCREV CUR MEDS BY ELIG CLIN: HCPCS | Performed by: INTERNAL MEDICINE

## 2018-05-01 PROCEDURE — 3045F PR MOST RECENT HEMOGLOBIN A1C LEVEL 7.0-9.0%: CPT | Performed by: INTERNAL MEDICINE

## 2018-05-01 PROCEDURE — 3017F COLORECTAL CA SCREEN DOC REV: CPT | Performed by: INTERNAL MEDICINE

## 2018-05-01 PROCEDURE — 82962 GLUCOSE BLOOD TEST: CPT | Performed by: INTERNAL MEDICINE

## 2018-05-01 PROCEDURE — 1036F TOBACCO NON-USER: CPT | Performed by: INTERNAL MEDICINE

## 2018-05-01 PROCEDURE — 99203 OFFICE O/P NEW LOW 30 MIN: CPT | Performed by: INTERNAL MEDICINE

## 2018-05-01 PROCEDURE — G8417 CALC BMI ABV UP PARAM F/U: HCPCS | Performed by: INTERNAL MEDICINE

## 2018-05-01 RX ORDER — BENZONATATE 200 MG/1
200 CAPSULE ORAL 3 TIMES DAILY PRN
COMMUNITY
End: 2018-06-08 | Stop reason: ALTCHOICE

## 2018-05-01 ASSESSMENT — ENCOUNTER SYMPTOMS
WHEEZING: 1
SHORTNESS OF BREATH: 1

## 2018-05-03 ENCOUNTER — HOSPITAL ENCOUNTER (OUTPATIENT)
Dept: PULMONOLOGY | Age: 58
Discharge: HOME | End: 2018-05-03
Payer: COMMERCIAL

## 2018-05-03 DIAGNOSIS — E11.9 TYPE 2 DIABETES MELLITUS WITHOUT COMPLICATION, WITHOUT LONG-TERM CURRENT USE OF INSULIN (HCC): ICD-10-CM

## 2018-05-03 DIAGNOSIS — J44.9 CHRONIC OBSTRUCTIVE PULMONARY DISEASE, UNSPECIFIED COPD TYPE (HCC): ICD-10-CM

## 2018-05-03 DIAGNOSIS — E78.2 MIXED HYPERLIPIDEMIA: ICD-10-CM

## 2018-05-03 PROCEDURE — 94060 EVALUATION OF WHEEZING: CPT | Performed by: INTERNAL MEDICINE

## 2018-05-03 PROCEDURE — 94726 PLETHYSMOGRAPHY LUNG VOLUMES: CPT

## 2018-05-03 PROCEDURE — 94729 DIFFUSING CAPACITY: CPT | Performed by: INTERNAL MEDICINE

## 2018-05-03 PROCEDURE — 94726 PLETHYSMOGRAPHY LUNG VOLUMES: CPT | Performed by: INTERNAL MEDICINE

## 2018-05-03 PROCEDURE — 94010 BREATHING CAPACITY TEST: CPT

## 2018-05-03 PROCEDURE — 94729 DIFFUSING CAPACITY: CPT

## 2018-05-04 RX ORDER — ATORVASTATIN CALCIUM 40 MG/1
40 TABLET, FILM COATED ORAL DAILY
Qty: 90 TABLET | Refills: 1 | Status: SHIPPED | OUTPATIENT
Start: 2018-05-04 | End: 2018-10-29 | Stop reason: SDUPTHER

## 2018-05-06 DIAGNOSIS — I15.2 HYPERTENSION DUE TO ENDOCRINE DISORDER: ICD-10-CM

## 2018-05-07 RX ORDER — AMLODIPINE BESYLATE 2.5 MG/1
2.5 TABLET ORAL DAILY
Qty: 90 TABLET | Refills: 1 | Status: SHIPPED | OUTPATIENT
Start: 2018-05-07 | End: 2018-10-29 | Stop reason: SDUPTHER

## 2018-05-09 ENCOUNTER — OFFICE VISIT (OUTPATIENT)
Dept: PULMONOLOGY | Age: 58
End: 2018-05-09
Payer: COMMERCIAL

## 2018-05-09 VITALS
DIASTOLIC BLOOD PRESSURE: 70 MMHG | HEIGHT: 65 IN | TEMPERATURE: 97.6 F | HEART RATE: 74 BPM | BODY MASS INDEX: 33.19 KG/M2 | WEIGHT: 199.2 LBS | SYSTOLIC BLOOD PRESSURE: 120 MMHG | OXYGEN SATURATION: 96 %

## 2018-05-09 DIAGNOSIS — F17.211 NICOTINE DEPENDENCE, CIGARETTES, IN REMISSION: ICD-10-CM

## 2018-05-09 DIAGNOSIS — J44.9 CHRONIC OBSTRUCTIVE PULMONARY DISEASE, UNSPECIFIED COPD TYPE (HCC): Primary | ICD-10-CM

## 2018-05-09 DIAGNOSIS — I27.20 PULMONARY HYPERTENSION (HCC): ICD-10-CM

## 2018-05-09 PROCEDURE — 99214 OFFICE O/P EST MOD 30 MIN: CPT | Performed by: PHYSICIAN ASSISTANT

## 2018-05-09 PROCEDURE — G8417 CALC BMI ABV UP PARAM F/U: HCPCS | Performed by: PHYSICIAN ASSISTANT

## 2018-05-09 PROCEDURE — 3023F SPIROM DOC REV: CPT | Performed by: PHYSICIAN ASSISTANT

## 2018-05-09 PROCEDURE — 3017F COLORECTAL CA SCREEN DOC REV: CPT | Performed by: PHYSICIAN ASSISTANT

## 2018-05-09 PROCEDURE — 1036F TOBACCO NON-USER: CPT | Performed by: PHYSICIAN ASSISTANT

## 2018-05-09 PROCEDURE — G8427 DOCREV CUR MEDS BY ELIG CLIN: HCPCS | Performed by: PHYSICIAN ASSISTANT

## 2018-05-09 PROCEDURE — G8926 SPIRO NO PERF OR DOC: HCPCS | Performed by: PHYSICIAN ASSISTANT

## 2018-05-09 ASSESSMENT — ENCOUNTER SYMPTOMS
TROUBLE SWALLOWING: 0
SINUS PAIN: 0
CHEST TIGHTNESS: 0
VOICE CHANGE: 0
ABDOMINAL PAIN: 0
WHEEZING: 0
BACK PAIN: 0
RHINORRHEA: 0
STRIDOR: 0
SORE THROAT: 0
COUGH: 0
SINUS PRESSURE: 0
SHORTNESS OF BREATH: 0

## 2018-05-11 ENCOUNTER — OFFICE VISIT (OUTPATIENT)
Dept: UROLOGY | Age: 58
End: 2018-05-11
Payer: COMMERCIAL

## 2018-05-11 VITALS
HEART RATE: 88 BPM | HEIGHT: 65 IN | BODY MASS INDEX: 33.15 KG/M2 | DIASTOLIC BLOOD PRESSURE: 88 MMHG | SYSTOLIC BLOOD PRESSURE: 138 MMHG | WEIGHT: 199 LBS

## 2018-05-11 DIAGNOSIS — R31.29 MICROHEMATURIA: Primary | ICD-10-CM

## 2018-05-11 PROCEDURE — G8417 CALC BMI ABV UP PARAM F/U: HCPCS | Performed by: UROLOGY

## 2018-05-11 PROCEDURE — 1036F TOBACCO NON-USER: CPT | Performed by: UROLOGY

## 2018-05-11 PROCEDURE — G8427 DOCREV CUR MEDS BY ELIG CLIN: HCPCS | Performed by: UROLOGY

## 2018-05-11 PROCEDURE — 3017F COLORECTAL CA SCREEN DOC REV: CPT | Performed by: UROLOGY

## 2018-05-11 PROCEDURE — 99214 OFFICE O/P EST MOD 30 MIN: CPT | Performed by: UROLOGY

## 2018-05-14 ENCOUNTER — OFFICE VISIT (OUTPATIENT)
Dept: CARDIOLOGY CLINIC | Age: 58
End: 2018-05-14
Payer: COMMERCIAL

## 2018-05-14 VITALS
DIASTOLIC BLOOD PRESSURE: 72 MMHG | HEIGHT: 65 IN | HEART RATE: 83 BPM | WEIGHT: 197 LBS | OXYGEN SATURATION: 97 % | RESPIRATION RATE: 16 BRPM | SYSTOLIC BLOOD PRESSURE: 114 MMHG | BODY MASS INDEX: 32.82 KG/M2

## 2018-05-14 DIAGNOSIS — I10 ESSENTIAL HYPERTENSION: ICD-10-CM

## 2018-05-14 DIAGNOSIS — E78.2 MIXED HYPERLIPIDEMIA: Primary | ICD-10-CM

## 2018-05-14 DIAGNOSIS — Q24.8: ICD-10-CM

## 2018-05-14 PROCEDURE — 3017F COLORECTAL CA SCREEN DOC REV: CPT | Performed by: INTERNAL MEDICINE

## 2018-05-14 PROCEDURE — G8427 DOCREV CUR MEDS BY ELIG CLIN: HCPCS | Performed by: INTERNAL MEDICINE

## 2018-05-14 PROCEDURE — G8417 CALC BMI ABV UP PARAM F/U: HCPCS | Performed by: INTERNAL MEDICINE

## 2018-05-14 PROCEDURE — 1036F TOBACCO NON-USER: CPT | Performed by: INTERNAL MEDICINE

## 2018-05-14 PROCEDURE — 99204 OFFICE O/P NEW MOD 45 MIN: CPT | Performed by: INTERNAL MEDICINE

## 2018-05-14 ASSESSMENT — ENCOUNTER SYMPTOMS
ABDOMINAL DISTENTION: 0
CHOKING: 0
COLOR CHANGE: 0
BACK PAIN: 0
CHEST TIGHTNESS: 0
ABDOMINAL PAIN: 0
APNEA: 0

## 2018-05-21 ENCOUNTER — HOSPITAL ENCOUNTER (OUTPATIENT)
Dept: GENERAL RADIOLOGY | Age: 58
Discharge: HOME | End: 2018-05-23
Payer: COMMERCIAL

## 2018-05-21 ENCOUNTER — HOSPITAL ENCOUNTER (OUTPATIENT)
Dept: CT IMAGING | Age: 58
Discharge: HOME | End: 2018-05-23
Payer: COMMERCIAL

## 2018-05-21 VITALS — DIASTOLIC BLOOD PRESSURE: 81 MMHG | RESPIRATION RATE: 16 BRPM | HEART RATE: 81 BPM | SYSTOLIC BLOOD PRESSURE: 132 MMHG

## 2018-05-21 DIAGNOSIS — R31.29 MICROHEMATURIA: ICD-10-CM

## 2018-05-21 PROCEDURE — 2580000003 HC RX 258: Performed by: UROLOGY

## 2018-05-21 PROCEDURE — 74018 RADEX ABDOMEN 1 VIEW: CPT

## 2018-05-21 PROCEDURE — 6360000004 HC RX CONTRAST MEDICATION: Performed by: UROLOGY

## 2018-05-21 PROCEDURE — 74178 CT ABD&PLV WO CNTR FLWD CNTR: CPT

## 2018-05-21 RX ORDER — SODIUM CHLORIDE 0.9 % (FLUSH) 0.9 %
10 SYRINGE (ML) INJECTION
Status: COMPLETED | OUTPATIENT
Start: 2018-05-21 | End: 2018-05-21

## 2018-05-21 RX ADMIN — IOPAMIDOL 100 ML: 755 INJECTION, SOLUTION INTRAVENOUS at 14:50

## 2018-05-21 RX ADMIN — Medication 10 ML: at 14:50

## 2018-05-24 ENCOUNTER — PROCEDURE VISIT (OUTPATIENT)
Dept: UROLOGY | Age: 58
End: 2018-05-24
Payer: COMMERCIAL

## 2018-05-24 VITALS
BODY MASS INDEX: 33.49 KG/M2 | HEART RATE: 89 BPM | SYSTOLIC BLOOD PRESSURE: 118 MMHG | DIASTOLIC BLOOD PRESSURE: 76 MMHG | WEIGHT: 201 LBS | HEIGHT: 65 IN

## 2018-05-24 DIAGNOSIS — R31.9 HEMATURIA, UNSPECIFIED TYPE: Primary | ICD-10-CM

## 2018-05-24 LAB
BILIRUBIN, POC: ABNORMAL
BLOOD URINE, POC: ABNORMAL
CLARITY, POC: CLEAR
COLOR, POC: YELLOW
GLUCOSE URINE, POC: ABNORMAL
KETONES, POC: ABNORMAL
LEUKOCYTE EST, POC: ABNORMAL
NITRITE, POC: ABNORMAL
PH, POC: 5
PROTEIN, POC: ABNORMAL
SPECIFIC GRAVITY, POC: 1.02
UROBILINOGEN, POC: 0.2

## 2018-05-24 PROCEDURE — 52000 CYSTOURETHROSCOPY: CPT | Performed by: UROLOGY

## 2018-05-24 PROCEDURE — 81003 URINALYSIS AUTO W/O SCOPE: CPT | Performed by: UROLOGY

## 2018-05-24 PROCEDURE — 99999 PR OFFICE/OUTPT VISIT,PROCEDURE ONLY: CPT | Performed by: UROLOGY

## 2018-05-24 RX ORDER — DOXYCYCLINE HYCLATE 100 MG
100 TABLET ORAL ONCE
Qty: 1 TABLET | Refills: 0 | COMMUNITY
Start: 2018-05-24 | End: 2018-05-24

## 2018-06-01 RX ORDER — IBUPROFEN 800 MG/1
TABLET ORAL
Qty: 200 TABLET | Refills: 0 | Status: SHIPPED | OUTPATIENT
Start: 2018-06-01 | End: 2018-08-04 | Stop reason: SDUPTHER

## 2018-06-08 ENCOUNTER — OFFICE VISIT (OUTPATIENT)
Dept: ENDOCRINOLOGY | Age: 58
End: 2018-06-08
Payer: COMMERCIAL

## 2018-06-08 VITALS
HEIGHT: 65 IN | SYSTOLIC BLOOD PRESSURE: 114 MMHG | WEIGHT: 196 LBS | BODY MASS INDEX: 32.65 KG/M2 | HEART RATE: 76 BPM | DIASTOLIC BLOOD PRESSURE: 70 MMHG

## 2018-06-08 DIAGNOSIS — E11.9 TYPE 2 DIABETES MELLITUS WITHOUT COMPLICATION, WITHOUT LONG-TERM CURRENT USE OF INSULIN (HCC): Primary | ICD-10-CM

## 2018-06-08 LAB — GLUCOSE BLD-MCNC: 132 MG/DL

## 2018-06-08 PROCEDURE — 3017F COLORECTAL CA SCREEN DOC REV: CPT | Performed by: INTERNAL MEDICINE

## 2018-06-08 PROCEDURE — 1036F TOBACCO NON-USER: CPT | Performed by: INTERNAL MEDICINE

## 2018-06-08 PROCEDURE — G8428 CUR MEDS NOT DOCUMENT: HCPCS | Performed by: INTERNAL MEDICINE

## 2018-06-08 PROCEDURE — 3045F PR MOST RECENT HEMOGLOBIN A1C LEVEL 7.0-9.0%: CPT | Performed by: INTERNAL MEDICINE

## 2018-06-08 PROCEDURE — G8417 CALC BMI ABV UP PARAM F/U: HCPCS | Performed by: INTERNAL MEDICINE

## 2018-06-08 PROCEDURE — 2022F DILAT RTA XM EVC RTNOPTHY: CPT | Performed by: INTERNAL MEDICINE

## 2018-06-08 PROCEDURE — 82962 GLUCOSE BLOOD TEST: CPT | Performed by: INTERNAL MEDICINE

## 2018-06-08 PROCEDURE — 99213 OFFICE O/P EST LOW 20 MIN: CPT | Performed by: INTERNAL MEDICINE

## 2018-06-18 DIAGNOSIS — Z72.0 TOBACCO ABUSE: ICD-10-CM

## 2018-06-18 RX ORDER — NICOTINE 21 MG/24HR
1 PATCH, TRANSDERMAL 24 HOURS TRANSDERMAL EVERY 24 HOURS
Qty: 30 PATCH | Refills: 0 | Status: SHIPPED | OUTPATIENT
Start: 2018-06-18 | End: 2018-11-16 | Stop reason: ALTCHOICE

## 2018-07-12 DIAGNOSIS — E78.2 MIXED HYPERLIPIDEMIA: ICD-10-CM

## 2018-07-12 DIAGNOSIS — I10 ESSENTIAL HYPERTENSION: Primary | ICD-10-CM

## 2018-07-13 DIAGNOSIS — E78.2 MIXED HYPERLIPIDEMIA: ICD-10-CM

## 2018-07-13 DIAGNOSIS — I10 ESSENTIAL HYPERTENSION: ICD-10-CM

## 2018-07-13 LAB
ALBUMIN SERPL-MCNC: 4.2 G/DL (ref 3.9–4.9)
ALP BLD-CCNC: 92 U/L (ref 40–130)
ALT SERPL-CCNC: 16 U/L (ref 0–33)
ANION GAP SERPL CALCULATED.3IONS-SCNC: 12 MEQ/L (ref 7–13)
AST SERPL-CCNC: 16 U/L (ref 0–35)
BASOPHILS ABSOLUTE: 0.1 K/UL (ref 0–0.2)
BASOPHILS RELATIVE PERCENT: 0.9 %
BILIRUB SERPL-MCNC: 0.5 MG/DL (ref 0–1.2)
BUN BLDV-MCNC: 10 MG/DL (ref 6–20)
CALCIUM SERPL-MCNC: 9.6 MG/DL (ref 8.6–10.2)
CHLORIDE BLD-SCNC: 103 MEQ/L (ref 98–107)
CHOLESTEROL, TOTAL: 151 MG/DL (ref 0–199)
CO2: 26 MEQ/L (ref 22–29)
CREAT SERPL-MCNC: 0.41 MG/DL (ref 0.5–0.9)
EOSINOPHILS ABSOLUTE: 0.3 K/UL (ref 0–0.7)
EOSINOPHILS RELATIVE PERCENT: 3.5 %
GFR AFRICAN AMERICAN: >60
GFR NON-AFRICAN AMERICAN: >60
GLOBULIN: 2.5 G/DL (ref 2.3–3.5)
GLUCOSE BLD-MCNC: 122 MG/DL (ref 74–109)
HCT VFR BLD CALC: 40.7 % (ref 37–47)
HDLC SERPL-MCNC: 59 MG/DL (ref 40–59)
HEMOGLOBIN: 13.6 G/DL (ref 12–16)
LDL CHOLESTEROL CALCULATED: 67 MG/DL (ref 0–129)
LYMPHOCYTES ABSOLUTE: 3.3 K/UL (ref 1–4.8)
LYMPHOCYTES RELATIVE PERCENT: 44.8 %
MCH RBC QN AUTO: 29.6 PG (ref 27–31.3)
MCHC RBC AUTO-ENTMCNC: 33.4 % (ref 33–37)
MCV RBC AUTO: 88.6 FL (ref 82–100)
MONOCYTES ABSOLUTE: 0.4 K/UL (ref 0.2–0.8)
MONOCYTES RELATIVE PERCENT: 6 %
NEUTROPHILS ABSOLUTE: 3.3 K/UL (ref 1.4–6.5)
NEUTROPHILS RELATIVE PERCENT: 44.8 %
PDW BLD-RTO: 13.8 % (ref 11.5–14.5)
PLATELET # BLD: 277 K/UL (ref 130–400)
POTASSIUM SERPL-SCNC: 4.3 MEQ/L (ref 3.5–5.1)
RBC # BLD: 4.59 M/UL (ref 4.2–5.4)
SODIUM BLD-SCNC: 141 MEQ/L (ref 132–144)
TOTAL PROTEIN: 6.7 G/DL (ref 6.4–8.1)
TRIGL SERPL-MCNC: 127 MG/DL (ref 0–200)
WBC # BLD: 7.4 K/UL (ref 4.8–10.8)

## 2018-07-19 ENCOUNTER — OFFICE VISIT (OUTPATIENT)
Dept: FAMILY MEDICINE CLINIC | Age: 58
End: 2018-07-19
Payer: COMMERCIAL

## 2018-07-19 VITALS
TEMPERATURE: 97.9 F | BODY MASS INDEX: 32.82 KG/M2 | DIASTOLIC BLOOD PRESSURE: 62 MMHG | WEIGHT: 197 LBS | SYSTOLIC BLOOD PRESSURE: 116 MMHG | HEIGHT: 65 IN | HEART RATE: 80 BPM | RESPIRATION RATE: 16 BRPM

## 2018-07-19 DIAGNOSIS — I10 ESSENTIAL HYPERTENSION: ICD-10-CM

## 2018-07-19 DIAGNOSIS — M47.26 OSTEOARTHRITIS OF SPINE WITH RADICULOPATHY, LUMBAR REGION: ICD-10-CM

## 2018-07-19 DIAGNOSIS — E78.2 MIXED HYPERLIPIDEMIA: ICD-10-CM

## 2018-07-19 DIAGNOSIS — D12.6 TUBULAR ADENOMA OF COLON: ICD-10-CM

## 2018-07-19 DIAGNOSIS — E11.9 TYPE 2 DIABETES MELLITUS WITHOUT COMPLICATION, WITHOUT LONG-TERM CURRENT USE OF INSULIN (HCC): ICD-10-CM

## 2018-07-19 DIAGNOSIS — E11.9 TYPE 2 DIABETES MELLITUS WITHOUT COMPLICATION, WITHOUT LONG-TERM CURRENT USE OF INSULIN (HCC): Primary | ICD-10-CM

## 2018-07-19 DIAGNOSIS — J44.9 CHRONIC OBSTRUCTIVE PULMONARY DISEASE, UNSPECIFIED COPD TYPE (HCC): ICD-10-CM

## 2018-07-19 LAB — HBA1C MFR BLD: 7.9 % (ref 4.8–5.9)

## 2018-07-19 PROCEDURE — 99214 OFFICE O/P EST MOD 30 MIN: CPT | Performed by: FAMILY MEDICINE

## 2018-07-19 PROCEDURE — 3045F PR MOST RECENT HEMOGLOBIN A1C LEVEL 7.0-9.0%: CPT | Performed by: FAMILY MEDICINE

## 2018-07-19 PROCEDURE — 2022F DILAT RTA XM EVC RTNOPTHY: CPT | Performed by: FAMILY MEDICINE

## 2018-07-19 PROCEDURE — 3023F SPIROM DOC REV: CPT | Performed by: FAMILY MEDICINE

## 2018-07-19 PROCEDURE — G8417 CALC BMI ABV UP PARAM F/U: HCPCS | Performed by: FAMILY MEDICINE

## 2018-07-19 PROCEDURE — 3017F COLORECTAL CA SCREEN DOC REV: CPT | Performed by: FAMILY MEDICINE

## 2018-07-19 PROCEDURE — G8427 DOCREV CUR MEDS BY ELIG CLIN: HCPCS | Performed by: FAMILY MEDICINE

## 2018-07-19 PROCEDURE — 1036F TOBACCO NON-USER: CPT | Performed by: FAMILY MEDICINE

## 2018-07-19 PROCEDURE — G8926 SPIRO NO PERF OR DOC: HCPCS | Performed by: FAMILY MEDICINE

## 2018-07-19 NOTE — PROGRESS NOTES
 Triglycerides 07/13/2018 127  0 - 200 mg/dL Final    ATP III Triglycerides Classification is Normal.    HDL 07/13/2018 59  40 - 59 mg/dL Final    Comment: ATP III HDL Cholesterol Classification is Desirable. Expected Values:    Males:    >55 = No Risk            35-55 = Moderate Risk            <35 = High Risk    Females:  >65 = No Risk            45-65 = Moderate Risk            <45 = High Risk    NCEP Guidelines: Third Report May 2001  >59 = negative risk factor for CHD  <40 = major risk factor for CHD      LDL Calculated 07/13/2018 67  0 - 129 mg/dL Final    ATP III LDL Classification is Optimal.    Sodium 07/13/2018 141  132 - 144 mEq/L Final    Potassium 07/13/2018 4.3  3.5 - 5.1 mEq/L Final    Chloride 07/13/2018 103  98 - 107 mEq/L Final    CO2 07/13/2018 26  22 - 29 mEq/L Final    Anion Gap 07/13/2018 12  7 - 13 mEq/L Final    Glucose 07/13/2018 122* 74 - 109 mg/dL Final    BUN 07/13/2018 10  6 - 20 mg/dL Final    CREATININE 07/13/2018 0.41* 0.50 - 0.90 mg/dL Final    GFR Non- 07/13/2018 >60.0  >60 Final    Comment: >60 mL/min/1.73m2 EGFR, calc. for ages 25 and older using the  MDRD formula (not corrected for weight), is valid for stable  renal function.  GFR  07/13/2018 >60.0  >60 Final    Comment: >60 mL/min/1.73m2 EGFR, calc. for ages 25 and older using the  MDRD formula (not corrected for weight), is valid for stable  renal function.       Calcium 07/13/2018 9.6  8.6 - 10.2 mg/dL Final    Total Protein 07/13/2018 6.7  6.4 - 8.1 g/dL Final    Alb 07/13/2018 4.2  3.9 - 4.9 g/dL Final    Total Bilirubin 07/13/2018 0.5  0.0 - 1.2 mg/dL Final    Alkaline Phosphatase 07/13/2018 92  40 - 130 U/L Final    ALT 07/13/2018 16  0 - 33 U/L Final    AST 07/13/2018 16  0 - 35 U/L Final    Globulin 07/13/2018 2.5  2.3 - 3.5 g/dL Final    WBC 07/13/2018 7.4  4.8 - 10.8 K/uL Final    RBC 07/13/2018 4.59  4.20 - 5.40 M/uL Final    Hemoglobin 07/13/2018 13.6 1. Type 2 diabetes mellitus without complication, without long-term current use of insulin ([address]     2. Essential hypertension     3. Mixed hyperlipidemia     4. Chronic obstructive pulmonary disease, unspecified COPD type ([address]     5. Osteoarthritis of spine with radiculopathy, lumbar region     6. Tubular adenoma of colon  Referral To Gastroenterology - (YANN) Lucian Dorsey MD     NEEDED hba1c didn't draw? ?  And again had a lengthy discussion w pt  about risks of poorly controlled diabetes including micro and macrovascular complications of DM2 including blindness,MI,CVA and death among other possibilities. Pt aware and agrees to better compliance and adherance to instructions such as regular eye exams q 1-2 y, foot exams,and f/u regularly for hba1c with a goal of 6.5.     NO evidence of neuropathy or nephropathy at this point    Follow up as planned for hba1c and BP checks    Sooner if condition deteriorates or problems arise  Ct lungs (-)    Jose Gonzalez MD

## 2018-08-04 RX ORDER — IBUPROFEN 800 MG/1
TABLET ORAL
Qty: 200 TABLET | Refills: 0 | Status: SHIPPED | OUTPATIENT
Start: 2018-08-04 | End: 2018-09-30 | Stop reason: SDUPTHER

## 2018-08-22 ENCOUNTER — ANESTHESIA EVENT (OUTPATIENT)
Dept: ENDOSCOPY | Age: 58
End: 2018-08-22
Payer: COMMERCIAL

## 2018-08-22 NOTE — ANESTHESIA PRE PROCEDURE
Department of Anesthesiology  Preprocedure Note       Name:  Luisito Lara   Age:  59 y.o.  :  1960                                          MRN:  [id number]         Date:  2018      Surgeon: Mariana Lerner):  Pete Berrios MD    Procedure: Procedure(s):  COLONOSCOPY    Medications prior to admission:   Prior to Admission medications    Medication Sig Start Date End Date Taking? Authorizing Provider   ibuprofen (ADVIL;MOTRIN) 800 MG tablet TAKE 1 TABLET BY MOUTH EVERY 8 HOURS AS NEEDED FOR PAIN 18   Christine Small MD   nicotine (NICODERM CQ) 21 MG/24HR Place 1 patch onto the skin every 24 hours 18  Jose Mendoza PA-C   insulin aspart protamine-insulin aspart (NOVOLOG MIX 70/30 FLEXPEN) (70-30) 100 UNIT/ML injection 25 units twice a day 18   Gali Gan MD   amLODIPine (NORVASC) 2.5 MG tablet TAKE 1 TABLET BY MOUTH DAILY 18   Christine Small MD   atorvastatin (LIPITOR) 40 MG tablet TAKE 1 TABLET BY MOUTH DAILY 18   Christine Small MD   Insulin Pen Needle (NOVOFINE) 32G X 6 MM MISC Use bid 18   Gali Gan MD   albuterol-ipratropium (COMBIVENT RESPIMAT)  MCG/ACT AERS inhaler Inhale 1 puff into the lungs every 4 hours as needed for Wheezing or Shortness of Breath 3/19/18   Flor Roque MD   metFORMIN (GLUCOPHAGE) 500 MG tablet Take 1 tablet by mouth 3 times daily 17   Christine Small MD   Handicap Placard MISC by Does not apply route 17   Christine Small MD       Current medications:    No current facility-administered medications for this encounter.       Current Outpatient Prescriptions   Medication Sig Dispense Refill    ibuprofen (ADVIL;MOTRIN) 800 MG tablet TAKE 1 TABLET BY MOUTH EVERY 8 HOURS AS NEEDED FOR PAIN 200 tablet 0    nicotine (NICODERM CQ) 21 MG/24HR Place 1 patch onto the skin every 24 hours 30 patch 0    insulin aspart protamine-insulin aspart (NOVOLOG MIX 70/30 FLEXPEN) (70-30) 100 UNIT/ML injection 25 units twice a by Earl Quiñones MD at [facility] Right 11/4/2016    RIGHT L3-4 L4-5 L5-S1 LAMINECTOMY RIGHT L4-5 MICRODISKECTOMY performed by Gurvinder Vergara MD at [facility] History:    Social History   Substance Use Topics    Smoking status: Former Smoker     Packs/day: 1.00     Years: 43.00     Types: Cigarettes     Quit date: 3/16/2018    Smokeless tobacco: Never Used    Alcohol use No                                Counseling given: Not Answered      Vital Signs (Current): There were no vitals filed for this visit. BP Readings from Last 3 Encounters:   07/19/18 116/62   06/08/18 114/70   05/24/18 118/76       NPO Status:                                                                                 BMI:   Wt Readings from Last 3 Encounters:   07/19/18 197 lb (89.4 kg)   06/08/18 196 lb (88.9 kg)   05/24/18 201 lb (91.2 kg)     There is no height or weight on file to calculate BMI.    CBC:   Lab Results   Component Value Date    WBC 7.4 07/13/2018    RBC 4.59 07/13/2018    HGB 13.6 07/13/2018    HCT 40.7 07/13/2018    MCV 88.6 07/13/2018    RDW 13.8 07/13/2018     07/13/2018       CMP:   Lab Results   Component Value Date     07/13/2018    K 4.3 07/13/2018    K 4.5 03/17/2018     07/13/2018    CO2 26 07/13/2018    BUN 10 07/13/2018    CREATININE 0.41 07/13/2018    GFRAA >60.0 07/13/2018    LABGLOM >60.0 07/13/2018    GLUCOSE 122 07/13/2018    PROT 6.7 07/13/2018    CALCIUM 9.6 07/13/2018    BILITOT 0.5 07/13/2018    ALKPHOS 92 07/13/2018    AST 16 07/13/2018    ALT 16 07/13/2018       POC Tests: No results for input(s): POCGLU, POCNA, POCK, POCCL, POCBUN, POCHEMO, POCHCT in the last 72 hours.     Coags:   Lab Results   Component Value Date    PROTIME 10.7 03/16/2018    INR 1.0 03/16/2018    APTT 26.9 10/15/2013       HCG (If Applicable): No results found for: PREGTESTUR, PREGSERUM, HCG, HCGQUANT     ABGs: No results found for: PHART,

## 2018-08-23 ENCOUNTER — ANESTHESIA (OUTPATIENT)
Dept: ENDOSCOPY | Age: 58
End: 2018-08-23
Payer: COMMERCIAL

## 2018-08-23 ENCOUNTER — HOSPITAL ENCOUNTER (OUTPATIENT)
Age: 58
Setting detail: OUTPATIENT SURGERY
Discharge: HOME | End: 2018-08-23
Attending: SPECIALIST | Admitting: SPECIALIST
Payer: COMMERCIAL

## 2018-08-23 VITALS
DIASTOLIC BLOOD PRESSURE: 70 MMHG | BODY MASS INDEX: 32.82 KG/M2 | SYSTOLIC BLOOD PRESSURE: 120 MMHG | RESPIRATION RATE: 18 BRPM | TEMPERATURE: 97.3 F | WEIGHT: 197 LBS | HEART RATE: 70 BPM | HEIGHT: 65 IN | OXYGEN SATURATION: 95 %

## 2018-08-23 VITALS
SYSTOLIC BLOOD PRESSURE: 107 MMHG | OXYGEN SATURATION: 95 % | RESPIRATION RATE: 7 BRPM | DIASTOLIC BLOOD PRESSURE: 69 MMHG

## 2018-08-23 PROCEDURE — 6360000002 HC RX W HCPCS: Performed by: NURSE ANESTHETIST, CERTIFIED REGISTERED

## 2018-08-23 PROCEDURE — 3700000001 HC ADD 15 MINUTES (ANESTHESIA): Performed by: SPECIALIST

## 2018-08-23 PROCEDURE — 7100000011 HC PHASE II RECOVERY - ADDTL 15 MIN: Performed by: SPECIALIST

## 2018-08-23 PROCEDURE — 2580000003 HC RX 258: Performed by: SPECIALIST

## 2018-08-23 PROCEDURE — 7100000010 HC PHASE II RECOVERY - FIRST 15 MIN: Performed by: SPECIALIST

## 2018-08-23 PROCEDURE — 3609027000 HC COLONOSCOPY: Performed by: SPECIALIST

## 2018-08-23 PROCEDURE — 2580000003 HC RX 258: Performed by: NURSE ANESTHETIST, CERTIFIED REGISTERED

## 2018-08-23 PROCEDURE — 3700000000 HC ANESTHESIA ATTENDED CARE: Performed by: SPECIALIST

## 2018-08-23 PROCEDURE — 88305 TISSUE EXAM BY PATHOLOGIST: CPT

## 2018-08-23 RX ORDER — PROPOFOL 10 MG/ML
INJECTION, EMULSION INTRAVENOUS PRN
Status: DISCONTINUED | OUTPATIENT
Start: 2018-08-23 | End: 2018-08-23 | Stop reason: SDUPTHER

## 2018-08-23 RX ORDER — SODIUM CHLORIDE 0.9 % (FLUSH) 0.9 %
10 SYRINGE (ML) INJECTION PRN
Status: DISCONTINUED | OUTPATIENT
Start: 2018-08-23 | End: 2018-08-23 | Stop reason: HOSPADM

## 2018-08-23 RX ORDER — LIDOCAINE HYDROCHLORIDE 10 MG/ML
1 INJECTION, SOLUTION EPIDURAL; INFILTRATION; INTRACAUDAL; PERINEURAL
Status: DISCONTINUED | OUTPATIENT
Start: 2018-08-23 | End: 2018-08-23 | Stop reason: HOSPADM

## 2018-08-23 RX ORDER — SODIUM CHLORIDE 9 MG/ML
INJECTION, SOLUTION INTRAVENOUS CONTINUOUS PRN
Status: DISCONTINUED | OUTPATIENT
Start: 2018-08-23 | End: 2018-08-23 | Stop reason: SDUPTHER

## 2018-08-23 RX ORDER — SODIUM CHLORIDE 9 MG/ML
INJECTION, SOLUTION INTRAVENOUS CONTINUOUS
Status: DISCONTINUED | OUTPATIENT
Start: 2018-08-23 | End: 2018-08-23 | Stop reason: HOSPADM

## 2018-08-23 RX ORDER — ONDANSETRON 2 MG/ML
4 INJECTION INTRAMUSCULAR; INTRAVENOUS
Status: DISCONTINUED | OUTPATIENT
Start: 2018-08-23 | End: 2018-08-23 | Stop reason: HOSPADM

## 2018-08-23 RX ORDER — SODIUM CHLORIDE 0.9 % (FLUSH) 0.9 %
10 SYRINGE (ML) INJECTION EVERY 12 HOURS SCHEDULED
Status: DISCONTINUED | OUTPATIENT
Start: 2018-08-23 | End: 2018-08-23 | Stop reason: HOSPADM

## 2018-08-23 RX ADMIN — PROPOFOL 20 MG: 10 INJECTION, EMULSION INTRAVENOUS at 10:53

## 2018-08-23 RX ADMIN — PROPOFOL 50 MG: 10 INJECTION, EMULSION INTRAVENOUS at 10:43

## 2018-08-23 RX ADMIN — SODIUM CHLORIDE: 9 INJECTION, SOLUTION INTRAVENOUS at 09:26

## 2018-08-23 RX ADMIN — PROPOFOL 50 MG: 10 INJECTION, EMULSION INTRAVENOUS at 10:39

## 2018-08-23 RX ADMIN — SODIUM CHLORIDE: 9 INJECTION, SOLUTION INTRAVENOUS at 10:35

## 2018-08-23 RX ADMIN — PROPOFOL 50 MG: 10 INJECTION, EMULSION INTRAVENOUS at 10:40

## 2018-08-23 RX ADMIN — SODIUM CHLORIDE: 9 INJECTION, SOLUTION INTRAVENOUS at 10:52

## 2018-08-23 RX ADMIN — PROPOFOL 50 MG: 10 INJECTION, EMULSION INTRAVENOUS at 10:51

## 2018-08-23 RX ADMIN — PROPOFOL 50 MG: 10 INJECTION, EMULSION INTRAVENOUS at 10:47

## 2018-08-23 ASSESSMENT — PAIN - FUNCTIONAL ASSESSMENT: PAIN_FUNCTIONAL_ASSESSMENT: 0-10

## 2018-09-10 ENCOUNTER — OFFICE VISIT (OUTPATIENT)
Dept: ENDOCRINOLOGY | Age: 58
End: 2018-09-10
Payer: COMMERCIAL

## 2018-09-10 VITALS
HEART RATE: 85 BPM | WEIGHT: 200 LBS | DIASTOLIC BLOOD PRESSURE: 77 MMHG | HEIGHT: 65 IN | SYSTOLIC BLOOD PRESSURE: 126 MMHG | BODY MASS INDEX: 33.32 KG/M2

## 2018-09-10 DIAGNOSIS — E11.9 TYPE 2 DIABETES MELLITUS WITHOUT COMPLICATION, WITHOUT LONG-TERM CURRENT USE OF INSULIN (HCC): Primary | ICD-10-CM

## 2018-09-10 LAB — GLUCOSE BLD-MCNC: 313 MG/DL

## 2018-09-10 PROCEDURE — G8417 CALC BMI ABV UP PARAM F/U: HCPCS | Performed by: INTERNAL MEDICINE

## 2018-09-10 PROCEDURE — 3045F PR MOST RECENT HEMOGLOBIN A1C LEVEL 7.0-9.0%: CPT | Performed by: INTERNAL MEDICINE

## 2018-09-10 PROCEDURE — 82962 GLUCOSE BLOOD TEST: CPT | Performed by: INTERNAL MEDICINE

## 2018-09-10 PROCEDURE — G8427 DOCREV CUR MEDS BY ELIG CLIN: HCPCS | Performed by: INTERNAL MEDICINE

## 2018-09-10 PROCEDURE — 1036F TOBACCO NON-USER: CPT | Performed by: INTERNAL MEDICINE

## 2018-09-10 PROCEDURE — 2022F DILAT RTA XM EVC RTNOPTHY: CPT | Performed by: INTERNAL MEDICINE

## 2018-09-10 PROCEDURE — 99213 OFFICE O/P EST LOW 20 MIN: CPT | Performed by: INTERNAL MEDICINE

## 2018-09-10 PROCEDURE — 3017F COLORECTAL CA SCREEN DOC REV: CPT | Performed by: INTERNAL MEDICINE

## 2018-09-16 ASSESSMENT — ENCOUNTER SYMPTOMS: SHORTNESS OF BREATH: 1

## 2018-09-16 NOTE — PROGRESS NOTES
Large Adult   Pulse: 85   Weight: 200 lb (90.7 kg)   Height: 5' 5\" (1.651 m)       Objective:   Physical Exam   Constitutional: She appears well-developed and well-nourished. HENT:   Head: Normocephalic and atraumatic. Eyes: Conjunctivae are normal.   Pulmonary/Chest: Effort normal.   Abdominal:   Obese    Musculoskeletal: Normal range of motion. Neurological: She is alert. Psychiatric: She has a normal mood and affect. Assessment:       Diagnosis Orders   1.  Type 2 diabetes mellitus without complication, without long-term current use of insulin ([facility])  POCT Glucose    Basic Metabolic Panel    Hemoglobin A1C    metFORMIN (GLUCOPHAGE) 500 MG tablet           Plan:      Orders Placed This Encounter   Procedures    Basic Metabolic Panel     Standing Status:   Future     Standing Expiration Date:   9/10/2019    Hemoglobin A1C     Standing Status:   Future     Standing Expiration Date:   9/10/2019    POCT Glucose     Increase insulin dose   Orders Placed This Encounter   Medications    insulin aspart protamine-insulin aspart (NOVOLOG MIX 70/30 FLEXPEN) (70-30) 100 UNIT/ML injection     Si units twice a day     Dispense:  10 pen     Refill:  3    metFORMIN (GLUCOPHAGE) 500 MG tablet     Sig: Bid     Dispense:  120 tablet     Refill:  3     hbaic goal of 6.5         Mirza Hilario MD

## 2018-10-01 RX ORDER — IBUPROFEN 800 MG/1
TABLET ORAL
Qty: 200 TABLET | Refills: 3 | Status: SHIPPED | OUTPATIENT
Start: 2018-10-01

## 2018-10-29 DIAGNOSIS — I15.2 HYPERTENSION DUE TO ENDOCRINE DISORDER: ICD-10-CM

## 2018-10-29 DIAGNOSIS — E78.2 MIXED HYPERLIPIDEMIA: ICD-10-CM

## 2018-10-29 DIAGNOSIS — E11.9 TYPE 2 DIABETES MELLITUS WITHOUT COMPLICATION, WITHOUT LONG-TERM CURRENT USE OF INSULIN (HCC): ICD-10-CM

## 2018-10-29 RX ORDER — ATORVASTATIN CALCIUM 40 MG/1
40 TABLET, FILM COATED ORAL DAILY
Qty: 90 TABLET | Refills: 3 | Status: SHIPPED | OUTPATIENT
Start: 2018-10-29

## 2018-10-29 RX ORDER — AMLODIPINE BESYLATE 2.5 MG/1
2.5 TABLET ORAL DAILY
Qty: 90 TABLET | Refills: 3 | Status: SHIPPED | OUTPATIENT
Start: 2018-10-29

## 2018-11-05 ENCOUNTER — NURSE ONLY (OUTPATIENT)
Dept: FAMILY MEDICINE CLINIC | Age: 58
End: 2018-11-05
Payer: COMMERCIAL

## 2018-11-05 DIAGNOSIS — Z23 NEED FOR INFLUENZA VACCINATION: Primary | ICD-10-CM

## 2018-11-05 PROCEDURE — 90688 IIV4 VACCINE SPLT 0.5 ML IM: CPT | Performed by: FAMILY MEDICINE

## 2018-11-05 PROCEDURE — 90471 IMMUNIZATION ADMIN: CPT | Performed by: FAMILY MEDICINE

## 2018-11-05 NOTE — PROGRESS NOTES
Vaccine Information Sheet, \"Influenza - Inactivated\"  given to Heide Echevarria, or parent/legal guardian of  Heide Echevarria and verbalized understanding. Patient responses:    Have you ever had a reaction to a flu vaccine? No  Are you able to eat eggs without adverse effects? Yes  Do you have any current illness? No  Have you ever had Gu[place] Syndrome? No    Flu vaccine given per order. Please see immunization tab.

## 2018-11-16 ENCOUNTER — OFFICE VISIT (OUTPATIENT)
Dept: FAMILY MEDICINE CLINIC | Age: 58
End: 2018-11-16
Payer: COMMERCIAL

## 2018-11-16 VITALS
RESPIRATION RATE: 16 BRPM | HEIGHT: 65 IN | DIASTOLIC BLOOD PRESSURE: 70 MMHG | BODY MASS INDEX: 33.32 KG/M2 | TEMPERATURE: 98.1 F | SYSTOLIC BLOOD PRESSURE: 144 MMHG | HEART RATE: 72 BPM | WEIGHT: 200 LBS

## 2018-11-16 DIAGNOSIS — E78.2 MIXED HYPERLIPIDEMIA: ICD-10-CM

## 2018-11-16 DIAGNOSIS — E11.9 TYPE 2 DIABETES MELLITUS WITHOUT COMPLICATION, WITHOUT LONG-TERM CURRENT USE OF INSULIN (HCC): Primary | ICD-10-CM

## 2018-11-16 DIAGNOSIS — M25.531 RIGHT WRIST PAIN: ICD-10-CM

## 2018-11-16 DIAGNOSIS — I10 ESSENTIAL HYPERTENSION: ICD-10-CM

## 2018-11-16 DIAGNOSIS — M47.817 LUMBOSACRAL SPONDYLOSIS WITHOUT MYELOPATHY: ICD-10-CM

## 2018-11-16 DIAGNOSIS — D12.6 TUBULAR ADENOMA OF COLON: ICD-10-CM

## 2018-11-16 DIAGNOSIS — E11.9 TYPE 2 DIABETES MELLITUS WITHOUT COMPLICATION, WITHOUT LONG-TERM CURRENT USE OF INSULIN (HCC): ICD-10-CM

## 2018-11-16 LAB
ALBUMIN SERPL-MCNC: 4.5 G/DL (ref 3.9–4.9)
ALP BLD-CCNC: 111 U/L (ref 40–130)
ALT SERPL-CCNC: 29 U/L (ref 0–33)
ANION GAP SERPL CALCULATED.3IONS-SCNC: 14 MEQ/L (ref 7–13)
AST SERPL-CCNC: 19 U/L (ref 0–35)
BILIRUB SERPL-MCNC: 0.4 MG/DL (ref 0–1.2)
BUN BLDV-MCNC: 7 MG/DL (ref 6–20)
CALCIUM SERPL-MCNC: 9.8 MG/DL (ref 8.6–10.2)
CHLORIDE BLD-SCNC: 103 MEQ/L (ref 98–107)
CO2: 24 MEQ/L (ref 22–29)
CREAT SERPL-MCNC: 0.52 MG/DL (ref 0.5–0.9)
CREATININE URINE: 51.5 MG/DL
GFR AFRICAN AMERICAN: >60
GFR NON-AFRICAN AMERICAN: >60
GLOBULIN: 2.5 G/DL (ref 2.3–3.5)
GLUCOSE BLD-MCNC: 121 MG/DL (ref 74–109)
HBA1C MFR BLD: 7.2 % (ref 4.8–5.9)
MICROALBUMIN UR-MCNC: 1.6 MG/DL
MICROALBUMIN/CREAT UR-RTO: 31.1 MG/G (ref 0–30)
POTASSIUM SERPL-SCNC: 3.8 MEQ/L (ref 3.5–5.1)
SEDIMENTATION RATE, ERYTHROCYTE: 6 MM (ref 0–30)
SODIUM BLD-SCNC: 141 MEQ/L (ref 132–144)
TOTAL PROTEIN: 7 G/DL (ref 6.4–8.1)
URIC ACID, SERUM: 3.5 MG/DL (ref 2.4–5.7)

## 2018-11-16 PROCEDURE — 1036F TOBACCO NON-USER: CPT | Performed by: FAMILY MEDICINE

## 2018-11-16 PROCEDURE — 99214 OFFICE O/P EST MOD 30 MIN: CPT | Performed by: FAMILY MEDICINE

## 2018-11-16 PROCEDURE — 2022F DILAT RTA XM EVC RTNOPTHY: CPT | Performed by: FAMILY MEDICINE

## 2018-11-16 PROCEDURE — G8417 CALC BMI ABV UP PARAM F/U: HCPCS | Performed by: FAMILY MEDICINE

## 2018-11-16 PROCEDURE — 3017F COLORECTAL CA SCREEN DOC REV: CPT | Performed by: FAMILY MEDICINE

## 2018-11-16 PROCEDURE — G8427 DOCREV CUR MEDS BY ELIG CLIN: HCPCS | Performed by: FAMILY MEDICINE

## 2018-11-16 PROCEDURE — G8482 FLU IMMUNIZE ORDER/ADMIN: HCPCS | Performed by: FAMILY MEDICINE

## 2018-11-16 PROCEDURE — 3045F PR MOST RECENT HEMOGLOBIN A1C LEVEL 7.0-9.0%: CPT | Performed by: FAMILY MEDICINE

## 2018-11-16 NOTE — PATIENT INSTRUCTIONS
Patient Education        Purine-Restricted Diet: Care Instructions  Your Care Instructions    Purines are substances that are found in some foods. Your body turns purines into uric acid. High levels of uric acid can cause gout, which is a form of arthritis that causes pain and inflammation in joints. You may be able to help control the amount of uric acid in your body by limiting high-purine foods in your diet. Follow-up care is a key part of your treatment and safety. Be sure to make and go to all appointments, and call your doctor if you are having problems. It's also a good idea to know your test results and keep a list of the medicines you take. How can you care for yourself at home? · Plan your meals and snacks around foods that are low in purines and are safe for you to eat. These foods include:  ? Green vegetables and tomatoes. ? Fruits. ? Whole-grain breads, rice, and cereals. ? Eggs, peanut butter, and nuts. ? Low-fat milk, cheese, and other milk products. ? Popcorn. ? Gelatin desserts, chocolate, cocoa, and cakes and sweets, in small amounts. · You can eat certain foods that are medium-high in purines, but eat them only once in a while. These foods include:  ? Legumes, such as dried beans and dried peas. You can have 1 cup cooked legumes each day. ? Asparagus, cauliflower, spinach, mushrooms, and green peas. ? Fish and seafood (other than very high-purine seafood). ? Oatmeal, wheat bran, and wheat germ. · Limit very high-purine foods, including:  ? Organ meats, such as liver, kidneys, sweetbreads, and brains. ? Meats, including beltran, beef, pork, and lamb. ? Game meats and any other meats in large amounts. ? Anchovies, sardines, herring, mackerel, and scallops. ? Gravy. ? Beer. Where can you learn more? Go to [email or web address] and sign in to your [employer] account.  Enter F448 in the [facility] box to learn more about \"Purine-Restricted Diet: Care

## 2018-11-19 ENCOUNTER — TELEPHONE (OUTPATIENT)
Dept: FAMILY MEDICINE CLINIC | Age: 58
End: 2018-11-19

## 2019-01-29 ENCOUNTER — TELEPHONE (OUTPATIENT)
Dept: FAMILY MEDICINE CLINIC | Age: 59
End: 2019-01-29

## 2019-01-30 RX ORDER — FLUCONAZOLE 150 MG/1
150 TABLET ORAL ONCE
Qty: 1 TABLET | Refills: 1 | Status: SHIPPED | OUTPATIENT
Start: 2019-01-30 | End: 2019-01-30

## 2019-03-14 DIAGNOSIS — I10 ESSENTIAL HYPERTENSION: Primary | ICD-10-CM

## 2019-03-14 DIAGNOSIS — E11.9 TYPE 2 DIABETES MELLITUS WITHOUT COMPLICATION, WITHOUT LONG-TERM CURRENT USE OF INSULIN (HCC): ICD-10-CM

## 2019-03-15 DIAGNOSIS — I10 ESSENTIAL HYPERTENSION: ICD-10-CM

## 2019-03-15 DIAGNOSIS — E11.9 TYPE 2 DIABETES MELLITUS WITHOUT COMPLICATION, WITHOUT LONG-TERM CURRENT USE OF INSULIN (HCC): ICD-10-CM

## 2019-03-15 LAB
ALBUMIN SERPL-MCNC: 4.2 G/DL (ref 3.5–4.6)
ALP BLD-CCNC: 124 U/L (ref 40–130)
ALT SERPL-CCNC: 25 U/L (ref 0–33)
ANION GAP SERPL CALCULATED.3IONS-SCNC: 13 MEQ/L (ref 9–15)
AST SERPL-CCNC: 16 U/L (ref 0–35)
BILIRUB SERPL-MCNC: 0.5 MG/DL (ref 0.2–0.7)
BUN BLDV-MCNC: 10 MG/DL (ref 8–23)
CALCIUM SERPL-MCNC: 9.2 MG/DL (ref 8.5–9.9)
CHLORIDE BLD-SCNC: 102 MEQ/L (ref 95–107)
CO2: 25 MEQ/L (ref 20–31)
CREAT SERPL-MCNC: 0.48 MG/DL (ref 0.5–0.9)
GFR AFRICAN AMERICAN: >60
GFR NON-AFRICAN AMERICAN: >60
GLOBULIN: 2.4 G/DL (ref 2.3–3.5)
GLUCOSE BLD-MCNC: 302 MG/DL (ref 70–99)
HBA1C MFR BLD: 10.2 % (ref 4.8–5.9)
POTASSIUM SERPL-SCNC: 3.9 MEQ/L (ref 3.4–4.9)
SODIUM BLD-SCNC: 140 MEQ/L (ref 135–144)
TOTAL PROTEIN: 6.6 G/DL (ref 6.3–8)

## 2019-03-29 ENCOUNTER — OFFICE VISIT (OUTPATIENT)
Dept: ENDOCRINOLOGY | Age: 59
End: 2019-03-29
Payer: MEDICARE

## 2019-03-29 VITALS
HEART RATE: 78 BPM | BODY MASS INDEX: 33.66 KG/M2 | SYSTOLIC BLOOD PRESSURE: 138 MMHG | WEIGHT: 202 LBS | DIASTOLIC BLOOD PRESSURE: 85 MMHG | HEIGHT: 65 IN

## 2019-03-29 DIAGNOSIS — E11.9 TYPE 2 DIABETES MELLITUS WITHOUT COMPLICATION, WITHOUT LONG-TERM CURRENT USE OF INSULIN (HCC): Primary | ICD-10-CM

## 2019-03-29 LAB — GLUCOSE BLD-MCNC: 133 MG/DL

## 2019-03-29 PROCEDURE — 1036F TOBACCO NON-USER: CPT | Performed by: INTERNAL MEDICINE

## 2019-03-29 PROCEDURE — 2022F DILAT RTA XM EVC RTNOPTHY: CPT | Performed by: INTERNAL MEDICINE

## 2019-03-29 PROCEDURE — G8427 DOCREV CUR MEDS BY ELIG CLIN: HCPCS | Performed by: INTERNAL MEDICINE

## 2019-03-29 PROCEDURE — 3017F COLORECTAL CA SCREEN DOC REV: CPT | Performed by: INTERNAL MEDICINE

## 2019-03-29 PROCEDURE — 3046F HEMOGLOBIN A1C LEVEL >9.0%: CPT | Performed by: INTERNAL MEDICINE

## 2019-03-29 PROCEDURE — 99213 OFFICE O/P EST LOW 20 MIN: CPT | Performed by: INTERNAL MEDICINE

## 2019-03-29 PROCEDURE — 82962 GLUCOSE BLOOD TEST: CPT | Performed by: INTERNAL MEDICINE

## 2019-03-29 PROCEDURE — G8417 CALC BMI ABV UP PARAM F/U: HCPCS | Performed by: INTERNAL MEDICINE

## 2019-03-29 PROCEDURE — G8482 FLU IMMUNIZE ORDER/ADMIN: HCPCS | Performed by: INTERNAL MEDICINE

## 2019-04-15 ENCOUNTER — OFFICE VISIT (OUTPATIENT)
Dept: PODIATRY | Facility: CLINIC | Age: 59
End: 2019-04-15

## 2019-04-15 VITALS — BODY MASS INDEX: 33.49 KG/M2 | TEMPERATURE: 97.1 F | WEIGHT: 201 LBS | HEIGHT: 65 IN

## 2019-04-15 DIAGNOSIS — M20.12 HALLUX ABDUCTO VALGUS, BILATERAL: ICD-10-CM

## 2019-04-15 DIAGNOSIS — M21.621 TAILOR'S BUNIONETTE, BILATERAL: ICD-10-CM

## 2019-04-15 DIAGNOSIS — L84 CALLUS: ICD-10-CM

## 2019-04-15 DIAGNOSIS — B35.1 DERMATOPHYTOSIS OF NAIL: ICD-10-CM

## 2019-04-15 DIAGNOSIS — E11.42 DIABETIC POLYNEUROPATHY ASSOCIATED WITH TYPE 2 DIABETES MELLITUS (HCC): ICD-10-CM

## 2019-04-15 DIAGNOSIS — M79.672 PAIN IN BOTH FEET: Primary | ICD-10-CM

## 2019-04-15 DIAGNOSIS — M21.622 TAILOR'S BUNIONETTE, BILATERAL: ICD-10-CM

## 2019-04-15 DIAGNOSIS — M20.11 HALLUX ABDUCTO VALGUS, BILATERAL: ICD-10-CM

## 2019-04-15 DIAGNOSIS — M79.671 PAIN IN BOTH FEET: Primary | ICD-10-CM

## 2019-04-15 RX ORDER — AMMONIUM LACTATE 12 G/100G
CREAM TOPICAL
Qty: 1 BOTTLE | Refills: 5 | Status: SHIPPED | OUTPATIENT
Start: 2019-04-15 | End: 2019-05-15

## 2019-04-15 ASSESSMENT — ENCOUNTER SYMPTOMS
NAUSEA: 0
BACK PAIN: 1
SHORTNESS OF BREATH: 0
CONSTIPATION: 0
DIARRHEA: 0

## 2019-04-15 NOTE — PROGRESS NOTES
Aleksandr Bearden  (1960)    4/15/19    Subjective     Aleksandr Bearden is 59 y.o. female who complains today of:    Chief Complaint   Patient presents with    Diabetes     foot and nail care    Other     muscle spasms of right leg        HPI: The patient presents for diabetic foot exam.  Patient complains of difficulty cutting her nails due to the thickness and deformity of the nail plates. The nails are also discolored. This is a chronic problem. Patient also complains of dry and rough skin to both feet. Patient currently wears diabetic shoes on a daily basis, she states that she is due for a new pair and would like a prescription to take with her to a local shoe store. Patient denies any sensory diabetic foot ulceration. Patient denies symptoms consistent with intermittent claudication but does report frequent cramping of the right posterior calf. Patient does take medication for hypertension but this does not include a diuretic. Patient does take statin medication but denies muscle problems elsewhere. Patient also reports tingling sensations that radiate from the OR back to the outside part of the left thigh. Patient has a history of spinal problems and surgery. Patient denies paresthesias of the hands or feet. Review of Systems   Constitutional: Negative for fever. HENT: Negative for hearing loss. Respiratory: Negative for shortness of breath. Gastrointestinal: Negative for constipation, diarrhea and nausea. Genitourinary: Negative for difficulty urinating. Musculoskeletal: Positive for back pain. Negative for neck pain. Skin: Negative for rash. Neurological: Negative for headaches. Hematological: Does not bruise/bleed easily. Psychiatric/Behavioral: Positive for sleep disturbance. She has not tried physical therapy. Date of last of last PT treatment: none    The patient is a diabetic.    PCP/ Endocrinologist: Dr. Ashwin Hinds   Date last seen: 3/29/19    Allergies:  Tere Ruelas [hydrocodone-acetaminophen]    Current Outpatient Medications on File Prior to Visit   Medication Sig Dispense Refill    ONE TOUCH ULTRA TEST strip TEST THREE TIMES DAILY AS NEEDED 200 strip 3    insulin aspart protamine-insulin aspart (NOVOLOG MIX 70/30 FLEXPEN) (70-30) 100 UNIT/ML injection 45 units twice a day 10 pen 3    Insulin Pen Needle (NOVOFINE) 32G X 6 MM MISC Use bid 100 each 3    atorvastatin (LIPITOR) 40 MG tablet TAKE 1 TABLET BY MOUTH DAILY 90 tablet 3    amLODIPine (NORVASC) 2.5 MG tablet TAKE 1 TABLET BY MOUTH DAILY 90 tablet 3    metFORMIN (GLUCOPHAGE) 500 MG tablet TAKE 1 TABLET BY MOUTH THREE TIMES DAILY 270 tablet 3    ibuprofen (ADVIL;MOTRIN) 800 MG tablet TAKE 1 TABLET BY MOUTH EVERY 8 HOURS AS NEEDED FOR PAIN 200 tablet 3    albuterol-ipratropium (COMBIVENT RESPIMAT)  MCG/ACT AERS inhaler Inhale 1 puff into the lungs every 4 hours as needed for Wheezing or Shortness of Breath 1 Inhaler 3    Handicap Placard MISC by Does not apply route 1 each 0     No current facility-administered medications on file prior to visit.         Past Medical History:   Diagnosis Date    Benign neoplasm of colon 2015    DR Miladys Palma    COPD (chronic obstructive pulmonary disease) ([facility] [address] 2018    Hyperlipidemia     Hypertension     Type II or unspecified type diabetes mellitus without mention of complication, not stated as uncontrolled      Past Surgical History:   Procedure Laterality Date    BACK SURGERY       SECTION      2    COLONOSCOPY  2015    DR Miladys Palma - POLYPS needs 2018    FACIAL RECONSTRUCTION SURGERY Bilateral 10/16/2017    EXCISION AND LAYERED PLASTIC CLOSURE INCLUSION CYST RIGHT EARLOBE AND LEFT CHEEK performed by Trinity Still MD at [facility] Right 2016    RIGHT L3-4 L4-5 L5-S1 LAMINECTOMY RIGHT L4-5 MICRODISKECTOMY performed by Jim Christianson MD at [address] [place] [address] SCRN NOT  W [address] [place] [address] N/A 2018    COLONOSCOPY razack polyp neat and clean. Patient is alert, oriented x3, and in no apparent distress. Vascular:   Dorsalis pedis pulse is palpable bilateral foot. Posterior tibial pulse is palpable bilateral foot. There is nonpitting edema noted to the bilateral pretibial area. Capillary refill is immediate bilateral hallux. There are nbo varicosities noted bilaterally. There are no telangiectasia noted bilaterally. Skin temperature gradient is noted to be warm to warm bilaterally. There are no signs of ischemia or skin atrophy noted bilaterally. Hair growth is present to the hallux bilaterally.     Neurological:   Light touch sensation is altered to the right hallux. Protective sensation is intact bilaterally. Vibratory sensation is diminished bilaterally. Sharp/dull sensation is intact bilaterally. Patellar deep tendon reflex is graded at 2/4 bilaterally. Achilles tendon deep to the reflex is graded at 2/4 bilaterally. The Babinski response is negative bilaterally. No ankle clonus is noted bilaterally. Positive Tinel sign noted with percussion of the first proper dorsal digital nerve right foot.        Dermatological:  No open lesions noted bilateral foot. Nails are are thickened, yellow in color, and have subungual debris bilateral foot. The the nail plates are incurvated bilaterally. There is pain on compression of the hallux nail bilaterally. There is no sign of bacterial infection  Xerotic skin texture noted bilaterally. Focal hyperkeratotic lesions noted to the bilateral hallux/ 1st MPJ and the tip of the bilateral 2nd toe. Normal skin turgor noted bilaterally. Webspaces are intact bilateral foot. Mild erythema overlying the bunion deformity bilateral foot. Well healed surgical cicatrix right 1st ray.     Musculoskeletal:  Rectus foot type noted bilaterally. Manual muscle strength is graded at 5/5 for all groups tested bilateral foot.   Gross static deformities noted: hallux abductovalgus and tailor's bunion with adductovarus 5th digit. Flexible flexion contracture noted to the PIPJ of toes 2-4 bilateral foot. No pain or crepitus noted with active or passive range of motion of the joints of the foot or ankle bilaterally. Psychiatric:    Judgement and insight intact. Short and long term memory intact. No evidence of depression, anxiety, or agitation. Patient is calm, cooperative, and communicative. Appropriate interactions and affect. Assessment:      Diagnosis Orders   1. Pain in both feet  TRIM BENIGN HYPERKERATOTIC SKIN LESION,2-4    43608 - [place] DEBRIDEMENT OF NAILS, 6 OR MORE   2. Diabetic polyneuropathy associated with type 2 diabetes mellitus (HCC)  TRIM BENIGN HYPERKERATOTIC SKIN LESION,2-4    91995 - [place] DEBRIDEMENT OF NAILS, 6 OR MORE   3. Callus  TRIM BENIGN HYPERKERATOTIC SKIN LESION,2-4   4. Dermatophytosis of nail  40423 - [place] DEBRIDEMENT OF NAILS, 6 OR MORE   5. Hallux abducto valgus, bilateral     6. Tailor's bunionette, bilateral         Plan:     Diabetes mellitus: I discussed the \"do's and donts\" of diabetes mellitus and diabetic foot care. The patient should adhere to the random glucose monitoring schedule according to their internist/endocrinologist and report any significant fluctuations or problems to them immediately. I emphasized the importance of daily foot checks and applying a moisturizing cream to the feet daily, but not in between the toes. If any ulcerations or signs and symptoms of infection arise, the patient is to call and return immediately for evaluation. I have prescribed custom molded diabetic inserts and extra depth diabetic shoes. Calluses: The hyperkeratotic lesions located at The bilateral hallux/first MPJ and second toe were pared to the level of normal skin with a #15 blade scalpel without incident. The patient is instructed to monitor for signs of infection and call immediately if these arise.  The patient is instructed to apply Prescribed keratolytic cream to the calluses and areas of dry skin daily. Onychomycosis: Nail plates 1-5 bilaterally were mechanically and electrically debrided to the level of normal underlying nail bed. The patient was instructed to attempt use of an emery board to maintain the length and thickness of their nails as best as possible if able. I have prescribed topical antifungal solution, apply to the toenails nightly. Call immediately should any problems arise. Right calf cramping: Due to the patient's history and symptom presentation, I believe the patient may be experiencing neurogenic claudication, patient  encouraged to follow up with neurosurgery for assessment. Orders Placed This Encounter   Medications    ciclopirox (PENLAC) 8 % solution     Sig: Apply to affected toenails nightly. Remove built up layers once per week. Dispense:  1 Bottle     Refill:  5    ammonium lactate (LAC-HYDRIN) 12 % cream     Sig: Apply topically to dry skin and calluses twice daily. Dispense:  1 Bottle     Refill:  5       Orders Placed This Encounter   Procedures    TRIM BENIGN HYPERKERATOTIC SKIN LESION,2-4    99240 - [place] DEBRIDEMENT OF NAILS, 6 OR MORE         Follow up:  Return in about 2 months (around 6/17/2019). Felipa Almodovar DPM      Please note that this report has been partially produced using speech recognition software which may cause errors including grammar, punctuation, and spelling or words and phrases that may seem inappropriate. If there are questions or concerns please feel free to contact me for clarification.

## 2019-04-15 NOTE — PATIENT INSTRUCTIONS
Patient Education        Diabetes Foot Health: Care Instructions  Your Care Instructions    When you have diabetes, your feet need extra care and attention. Diabetes can damage the nerve endings and blood vessels in your feet, making you less likely to notice when your feet are injured. Diabetes also limits your body's ability to fight infection and get blood to areas that need it. If you get a minor foot injury, it could become an ulcer or a serious infection. With good foot care, you can prevent most of these problems. Caring for your feet can be quick and easy. Most of the care can be done when you are bathing or getting ready for bed. Follow-up care is a key part of your treatment and safety. Be sure to make and go to all appointments, and call your doctor if you are having problems. It's also a good idea to know your test results and keep a list of the medicines you take. How can you care for yourself at home? · Keep your blood sugar close to normal by watching what and how much you eat, monitoring blood sugar, taking medicines if prescribed, and getting regular exercise. · Do not smoke. Smoking affects blood flow and can make foot problems worse. If you need help quitting, talk to your doctor about stop-smoking programs and medicines. These can increase your chances of quitting for good. · Eat a diet that is low in fats. High fat intake can cause fat to build up in your blood vessels and decrease blood flow. · Inspect your feet daily for blisters, cuts, cracks, or sores. If you cannot see well, use a mirror or have someone help you. · Take care of your feet:  ? Wash your feet every day. Use warm (not hot) water. Check the water temperature with your wrists or other part of your body, not your feet. ? Dry your feet well. Pat them dry. Do not rub the skin on your feet too hard. Dry well between your toes.  If the skin on your feet stays moist, bacteria or a fungus can grow, which can lead to infection. ? Keep your skin soft. Use moisturizing skin cream to keep the skin on your feet soft and prevent calluses and cracks. But do not put the cream between your toes, and stop using any cream that causes a rash. ? Clean underneath your toenails carefully. Do not use a sharp object to clean underneath your toenails. Use the blunt end of a nail file or other rounded tool. ? Trim and file your toenails straight across to prevent ingrown toenails. Use a nail clipper, not scissors. Use an emery board to smooth the edges. · Change socks daily. Socks without seams are best, because seams often rub the feet. You can find socks for people with diabetes from specialty catalogs. · Look inside your shoes every day for things like gravel or torn linings, which could cause blisters or sores. · Buy shoes that fit well:  ? Look for shoes that have plenty of space around the toes. This helps prevent bunions and blisters. ? Try on shoes while wearing the kind of socks you will usually wear with the shoes. ? Avoid plastic shoes. They may rub your feet and cause blisters. Good shoes should be made of materials that are flexible and breathable, such as leather or cloth. ? Break in new shoes slowly by wearing them for no more than an hour a day for several days. Take extra time to check your feet for red areas, blisters, or other problems after you wear new shoes. · Do not go barefoot. Do not wear sandals, and do not wear shoes with very thin soles. Thin soles are easy to puncture. They also do not protect your feet from hot pavement or cold weather. · Have your doctor check your feet during each visit. If you have a foot problem, see your doctor. Do not try to treat an early foot problem at home. Home remedies or treatments that you can buy without a prescription (such as corn removers) can be harmful. · Always get early treatment for foot problems.  A minor irritation can lead to a major problem if not properly cared for early. When should you call for help? Call your doctor now or seek immediate medical care if:    · You have a foot sore, an ulcer or break in the skin that is not healing after 4 days, bleeding corns or calluses, or an ingrown toenail.     · You have blue or black areas, which can mean bruising or blood flow problems.     · You have peeling skin or tiny blisters between your toes or cracking or oozing of the skin.     · You have a fever for more than 24 hours and a foot sore.     · You have new numbness or tingling in your feet that does not go away after you move your feet or change positions.     · You have unexplained or unusual swelling of the foot or ankle.    Watch closely for changes in your health, and be sure to contact your doctor if:    · You cannot do proper foot care. Where can you learn more? Go to https://[employer]iceweb.[employer]. org and sign in to your [employer] account. Enter A739 in the [employer] box to learn more about \"Diabetes Foot Health: Care Instructions. \"     If you do not have an account, please click on the \"Sign Up Now\" link. Current as of: July 25, 2018  Content Version: 11.9  © 6814-1938 [employer]. Care instructions adapted under license by [facility] ([facility]). If you have questions about a medical condition or this instruction, always ask your healthcare professional. [place] [address] any warranty or liability for your use of this information.

## 2019-06-11 ENCOUNTER — TELEPHONE (OUTPATIENT)
Dept: ADMINISTRATIVE | Age: 59
End: 2019-06-11

## 2019-06-24 ENCOUNTER — OFFICE VISIT (OUTPATIENT)
Dept: PODIATRY | Facility: CLINIC | Age: 59
End: 2019-06-24

## 2019-06-24 VITALS — WEIGHT: 201 LBS | TEMPERATURE: 98.9 F | HEIGHT: 65 IN | BODY MASS INDEX: 33.49 KG/M2

## 2019-06-24 DIAGNOSIS — B35.1 DERMATOPHYTOSIS OF NAIL: ICD-10-CM

## 2019-06-24 DIAGNOSIS — M79.671 PAIN IN BOTH FEET: Primary | ICD-10-CM

## 2019-06-24 DIAGNOSIS — L84 CALLUS: ICD-10-CM

## 2019-06-24 DIAGNOSIS — S90.112A CONTUSION OF LEFT GREAT TOE WITHOUT DAMAGE TO NAIL, INITIAL ENCOUNTER: ICD-10-CM

## 2019-06-24 DIAGNOSIS — M79.672 PAIN IN BOTH FEET: Primary | ICD-10-CM

## 2019-06-24 DIAGNOSIS — E11.42 DIABETIC POLYNEUROPATHY ASSOCIATED WITH TYPE 2 DIABETES MELLITUS (HCC): ICD-10-CM

## 2019-06-24 DIAGNOSIS — L60.0 INGROWING NAIL: ICD-10-CM

## 2019-06-24 ASSESSMENT — ENCOUNTER SYMPTOMS
DIARRHEA: 0
SHORTNESS OF BREATH: 0
CONSTIPATION: 0
NAUSEA: 0
BACK PAIN: 1

## 2019-06-24 NOTE — PROGRESS NOTES
Juan Zamora  (1960)    6/24/19    Subjective     Juan Zamora is 59 y.o. female who complains today of:    Chief Complaint   Patient presents with    Foot Pain       HPI: patient presents for diabetic foot care. Patient complains of toenail pain bilaterally, there is severe pain to the left hallux toenail. Patient denies observing signs of infection. She has noticed purple discoloration to the tip of the toe. Patient has been using the ciclopirox nail solution and the Lac-Hydrin keratolytic cream.    Review of Systems   Constitutional: Negative for fever. HENT: Negative for hearing loss. Respiratory: Negative for shortness of breath. Gastrointestinal: Negative for constipation, diarrhea and nausea. Genitourinary: Negative for difficulty urinating. Musculoskeletal: Positive for back pain. Negative for neck pain. Skin: Negative for rash. Neurological: Negative for headaches. Hematological: Does not bruise/bleed easily. Psychiatric/Behavioral: Positive for sleep disturbance. She has not tried physical therapy. Date of last of last PT treatment: none     The patient is a diabetic. PCP/ Endocrinologist: Dr. Pantera Lindsey              Date last seen: 3/29/19      Allergies:  Norco [hydrocodone-acetaminophen]    Current Outpatient Medications on File Prior to Visit   Medication Sig Dispense Refill    ciclopirox (PENLAC) 8 % solution Apply to affected toenails nightly. Remove built up layers once per week.  1 Bottle 5    ONE TOUCH ULTRA TEST strip TEST THREE TIMES DAILY AS NEEDED 200 strip 3    insulin aspart protamine-insulin aspart (NOVOLOG MIX 70/30 FLEXPEN) (70-30) 100 UNIT/ML injection 45 units twice a day 10 pen 3    Insulin Pen Needle (NOVOFINE) 32G X 6 MM MISC Use bid 100 each 3    atorvastatin (LIPITOR) 40 MG tablet TAKE 1 TABLET BY MOUTH DAILY 90 tablet 3    amLODIPine (NORVASC) 2.5 MG tablet TAKE 1 TABLET BY MOUTH DAILY 90 tablet 3    metFORMIN (GLUCOPHAGE) 500 MG tablet TAKE 1 TABLET BY MOUTH THREE TIMES DAILY 270 tablet 3    ibuprofen (ADVIL;MOTRIN) 800 MG tablet TAKE 1 TABLET BY MOUTH EVERY 8 HOURS AS NEEDED FOR PAIN 200 tablet 3    albuterol-ipratropium (COMBIVENT RESPIMAT)  MCG/ACT AERS inhaler Inhale 1 puff into the lungs every 4 hours as needed for Wheezing or Shortness of Breath 1 Inhaler 3    Handicap Placard MISC by Does not apply route 1 each 0     No current facility-administered medications on file prior to visit.         Past Medical History:   Diagnosis Date    Benign neoplasm of colon 2015    DR Jorge Marcos    [facility]    Hyperlipidemia     Hypertension     Type II or unspecified type diabetes mellitus without mention of complication, not stated as uncontrolled      Past Surgical History:   Procedure Laterality Date    BACK SURGERY       SECTION      2    COLONOSCOPY  2015    DR Jorge Marcos - POLYPS needs 2018    FACIAL RECONSTRUCTION SURGERY Bilateral 10/16/2017    EXCISION AND LAYERED PLASTIC CLOSURE INCLUSION CYST RIGHT EARLOBE AND LEFT CHEEK performed by Kamari Ricketts MD at [facility] Right 2016    RIGHT L3-4 L4-5 L5-S1 LAMINECTOMY RIGHT L4-5 MICRODISKECTOMY performed by Emanuel Ayon MD at [address] [place] [address] SCRN NOT  W [address] [place] [address] N/A 2018    COLONOSCOPY razack polyp 2023 next     Social History     Socioeconomic History    Marital status:      Spouse name: Not on file    Number of children: Not on file    Years of education: Not on file    Highest education level: Not on file   Occupational History    Not on file   Social Needs    Financial resource strain: Not on file    Food insecurity:     Worry: Not on file     Inability: Not on file    Transportation needs:     Medical: Not on file     Non-medical: Not on file   Tobacco Use    Smoking status: Former Smoker     Packs/day: 1.00     Years: 43.00     Pack years: 43.00     Types: Cigarettes     Last attempt to quit: 3/16/2018     Years since quittin.2    Smokeless tobacco: Never Used   Substance and Sexual Activity    Alcohol use: No    Drug use: No    Sexual activity: Not on file   Lifestyle    Physical activity:     Days per week: Not on file     Minutes per session: Not on file    Stress: Not on file   Relationships    Social connections:     Talks on phone: Not on file     Gets together: Not on file     Attends Restorationism service: Not on file     Active member of club or organization: Not on file     Attends meetings of clubs or organizations: Not on file     Relationship status: Not on file    Intimate partner violence:     Fear of current or ex partner: Not on file     Emotionally abused: Not on file     Physically abused: Not on file     Forced sexual activity: Not on file   Other Topics Concern    Not on file   Social History Narrative    Not on file     Family History   Problem Relation Age of Onset    Diabetes Mother     High Blood Pressure Mother     High Cholesterol Mother     Thyroid Disease Mother     Diabetes Father            Objective:   Vitals:  Temp 98.9 °F (37.2 °C) (Tympanic)   Ht 5' 5\" (1.651 m)   Wt 201 lb (91.2 kg)   LMP  (LMP Unknown)   BMI 33.45 kg/m² Pain Score:   5    Physical Exam  Vascular:   Dorsalis pedis pulse is palpable bilateral foot. Posterior tibial pulse is palpable bilateral foot. There is nonpitting edema noted to the bilateral pretibial area. Capillary refill is immediate bilateral hallux. There are nbo varicosities noted bilaterally. There are no telangiectasia noted bilaterally. Skin temperature gradient is noted to be warm to warm bilaterally. There are no signs of ischemia or skin atrophy noted bilaterally. Hair growth is present to the hallux bilaterally.     Neurological:   Light touch sensation is altered to the right hallux. Protective sensation is intact bilaterally.   Vibratory sensation is diminished bilaterally. Sharp/dull sensation is intact bilaterally. Patellar deep tendon reflex is graded at 2/4 bilaterally. Achilles tendon deep to the reflex is graded at 2/4 bilaterally. The Babinski response is negative bilaterally. No ankle clonus is noted bilaterally. Positive Tinel sign noted with percussion of the first proper dorsal digital nerve right foot.        Dermatological:  No open lesions noted bilateral foot. Nails are are thickened, yellow in color, and have subungual debris bilateral foot. The the nail plates are incurvated bilaterally.  There is pain on compression of the hallux nail bilaterally. There is no sign of bacterial infection or abscess formation. There is ecchymosis noted to the distal tip of the left hallux. Xerotic skin texture noted bilaterally. Focal hyperkeratotic lesions noted to the bilateral hallux/ 1st MPJ and the tip of the bilateral 2nd toe. Normal skin turgor noted bilaterally. Webspaces are intact bilateral foot. Mild erythema overlying the bunion deformity bilateral foot. Well healed surgical cicatrix right 1st ray.     Musculoskeletal:  Rectus foot type noted bilaterally. Manual muscle strength is graded at 5/5 for all groups tested bilateral foot. Gross static deformities noted: hallux abductovalgus and tailor's bunion with adductovarus 5th digit. Flexible flexion contracture noted to the PIPJ of toes 2-4 bilateral foot. No pain or crepitus noted with active or passive range of motion of the joints of the foot or ankle bilaterally. Assessment:      Diagnosis Orders   1. Pain in both feet  TRIM BENIGN HYPERKERATOTIC SKIN LESION,2-4    37844 - [place] DEBRIDEMENT OF NAILS, 6 OR MORE   2. Diabetic polyneuropathy associated with type 2 diabetes mellitus (HCC)  TRIM BENIGN HYPERKERATOTIC SKIN LESION,2-4    26185 - [place] DEBRIDEMENT OF NAILS, 6 OR MORE   3. Callus  TRIM BENIGN HYPERKERATOTIC SKIN LESION,2-4   4.  Dermatophytosis of nail  [address] - [place]

## 2019-07-05 DIAGNOSIS — E11.9 TYPE 2 DIABETES MELLITUS WITHOUT COMPLICATION, WITHOUT LONG-TERM CURRENT USE OF INSULIN (HCC): ICD-10-CM

## 2019-07-05 LAB
ANION GAP SERPL CALCULATED.3IONS-SCNC: 17 MEQ/L (ref 9–15)
BUN BLDV-MCNC: 10 MG/DL (ref 8–23)
CALCIUM SERPL-MCNC: 9.1 MG/DL (ref 8.5–9.9)
CHLORIDE BLD-SCNC: 103 MEQ/L (ref 95–107)
CO2: 22 MEQ/L (ref 20–31)
CREAT SERPL-MCNC: 0.43 MG/DL (ref 0.5–0.9)
CREATININE URINE: 152.7 MG/DL
GFR AFRICAN AMERICAN: >60
GFR NON-AFRICAN AMERICAN: >60
GLUCOSE BLD-MCNC: 117 MG/DL (ref 70–99)
HBA1C MFR BLD: 7.3 % (ref 4.8–5.9)
MICROALBUMIN UR-MCNC: 4.2 MG/DL
MICROALBUMIN/CREAT UR-RTO: 27.5 MG/G (ref 0–30)
POTASSIUM SERPL-SCNC: 3.4 MEQ/L (ref 3.4–4.9)
SODIUM BLD-SCNC: 142 MEQ/L (ref 135–144)

## 2019-07-09 ENCOUNTER — OFFICE VISIT (OUTPATIENT)
Dept: ENDOCRINOLOGY | Age: 59
End: 2019-07-09
Payer: MEDICARE

## 2019-07-09 VITALS
SYSTOLIC BLOOD PRESSURE: 133 MMHG | HEART RATE: 86 BPM | DIASTOLIC BLOOD PRESSURE: 80 MMHG | HEIGHT: 65 IN | WEIGHT: 197 LBS | BODY MASS INDEX: 32.82 KG/M2

## 2019-07-09 DIAGNOSIS — E11.9 TYPE 2 DIABETES MELLITUS WITHOUT COMPLICATION, WITHOUT LONG-TERM CURRENT USE OF INSULIN (HCC): Primary | ICD-10-CM

## 2019-07-09 LAB
CHP ED QC CHECK: NORMAL
GLUCOSE BLD-MCNC: 243 MG/DL

## 2019-07-09 PROCEDURE — 3017F COLORECTAL CA SCREEN DOC REV: CPT | Performed by: INTERNAL MEDICINE

## 2019-07-09 PROCEDURE — 99213 OFFICE O/P EST LOW 20 MIN: CPT | Performed by: INTERNAL MEDICINE

## 2019-07-09 PROCEDURE — G8427 DOCREV CUR MEDS BY ELIG CLIN: HCPCS | Performed by: INTERNAL MEDICINE

## 2019-07-09 PROCEDURE — 3045F PR MOST RECENT HEMOGLOBIN A1C LEVEL 7.0-9.0%: CPT | Performed by: INTERNAL MEDICINE

## 2019-07-09 PROCEDURE — 82962 GLUCOSE BLOOD TEST: CPT | Performed by: INTERNAL MEDICINE

## 2019-07-09 PROCEDURE — G8417 CALC BMI ABV UP PARAM F/U: HCPCS | Performed by: INTERNAL MEDICINE

## 2019-07-09 PROCEDURE — 1036F TOBACCO NON-USER: CPT | Performed by: INTERNAL MEDICINE

## 2019-07-09 PROCEDURE — 2022F DILAT RTA XM EVC RTNOPTHY: CPT | Performed by: INTERNAL MEDICINE

## 2019-07-09 NOTE — PROGRESS NOTES
and affect. Assessment:       Diagnosis Orders   1.  Type 2 diabetes mellitus without complication, without long-term current use of insulin (HCC)  Basic Metabolic Panel    Hemoglobin A1C    Microalbumin / Creatinine Urine Ratio    POCT Glucose           Plan:      Orders Placed This Encounter   Procedures    Basic Metabolic Panel     Standing Status:   Future     Standing Expiration Date:   7/9/2020    Hemoglobin A1C     Standing Status:   Future     Standing Expiration Date:   7/9/2020    Microalbumin / Creatinine Urine Ratio     Standing Status:   Future     Standing Expiration Date:   7/9/2020    POCT Glucose     Continue novolog 70/30 45 units bid    Medications Discontinued During This Encounter   Medication Reason    metFORMIN (GLUCOPHAGE) 500 MG tablet Side effects             Osvaldo Baez MD

## 2019-10-03 DIAGNOSIS — E11.9 TYPE 2 DIABETES MELLITUS WITHOUT COMPLICATION, WITHOUT LONG-TERM CURRENT USE OF INSULIN (HCC): ICD-10-CM

## 2019-10-03 LAB
ANION GAP SERPL CALCULATED.3IONS-SCNC: 14 MEQ/L (ref 9–15)
BUN BLDV-MCNC: 12 MG/DL (ref 8–23)
CALCIUM SERPL-MCNC: 9.2 MG/DL (ref 8.5–9.9)
CHLORIDE BLD-SCNC: 109 MEQ/L (ref 95–107)
CO2: 22 MEQ/L (ref 20–31)
CREAT SERPL-MCNC: 0.71 MG/DL (ref 0.5–0.9)
CREATININE URINE: 60.6 MG/DL
GFR AFRICAN AMERICAN: >60
GFR NON-AFRICAN AMERICAN: >60
GLUCOSE BLD-MCNC: 64 MG/DL (ref 70–99)
HBA1C MFR BLD: 6.5 % (ref 4.8–5.9)
MICROALBUMIN UR-MCNC: 1.6 MG/DL
MICROALBUMIN/CREAT UR-RTO: 26.4 MG/G (ref 0–30)
POTASSIUM SERPL-SCNC: 3.4 MEQ/L (ref 3.4–4.9)
SODIUM BLD-SCNC: 145 MEQ/L (ref 135–144)

## 2019-10-09 ENCOUNTER — OFFICE VISIT (OUTPATIENT)
Dept: ENDOCRINOLOGY | Age: 59
End: 2019-10-09
Payer: MEDICARE

## 2019-10-09 VITALS
OXYGEN SATURATION: 95 % | BODY MASS INDEX: 33.12 KG/M2 | DIASTOLIC BLOOD PRESSURE: 81 MMHG | WEIGHT: 194 LBS | HEIGHT: 64 IN | SYSTOLIC BLOOD PRESSURE: 124 MMHG | RESPIRATION RATE: 16 BRPM | HEART RATE: 99 BPM

## 2019-10-09 DIAGNOSIS — E11.9 TYPE 2 DIABETES MELLITUS WITHOUT COMPLICATION, WITHOUT LONG-TERM CURRENT USE OF INSULIN (HCC): Primary | ICD-10-CM

## 2019-10-09 LAB
CHP ED QC CHECK: NORMAL
GLUCOSE BLD-MCNC: 214 MG/DL

## 2019-10-09 PROCEDURE — G8427 DOCREV CUR MEDS BY ELIG CLIN: HCPCS | Performed by: INTERNAL MEDICINE

## 2019-10-09 PROCEDURE — G0008 ADMIN INFLUENZA VIRUS VAC: HCPCS | Performed by: INTERNAL MEDICINE

## 2019-10-09 PROCEDURE — G8482 FLU IMMUNIZE ORDER/ADMIN: HCPCS | Performed by: INTERNAL MEDICINE

## 2019-10-09 PROCEDURE — 99213 OFFICE O/P EST LOW 20 MIN: CPT | Performed by: INTERNAL MEDICINE

## 2019-10-09 PROCEDURE — 2022F DILAT RTA XM EVC RTNOPTHY: CPT | Performed by: INTERNAL MEDICINE

## 2019-10-09 PROCEDURE — 3017F COLORECTAL CA SCREEN DOC REV: CPT | Performed by: INTERNAL MEDICINE

## 2019-10-09 PROCEDURE — 82962 GLUCOSE BLOOD TEST: CPT | Performed by: INTERNAL MEDICINE

## 2019-10-09 PROCEDURE — G8417 CALC BMI ABV UP PARAM F/U: HCPCS | Performed by: INTERNAL MEDICINE

## 2019-10-09 PROCEDURE — 3044F HG A1C LEVEL LT 7.0%: CPT | Performed by: INTERNAL MEDICINE

## 2019-10-09 PROCEDURE — 90686 IIV4 VACC NO PRSV 0.5 ML IM: CPT | Performed by: INTERNAL MEDICINE

## 2019-10-09 PROCEDURE — 4004F PT TOBACCO SCREEN RCVD TLK: CPT | Performed by: INTERNAL MEDICINE

## 2019-10-16 ENCOUNTER — TELEPHONE (OUTPATIENT)
Dept: ENDOCRINOLOGY | Age: 59
End: 2019-10-16

## 2019-10-25 RX ORDER — INSULIN LISPRO 100 [IU]/ML
INJECTION, SUSPENSION SUBCUTANEOUS
Qty: 10 PEN | Refills: 3 | Status: SHIPPED | OUTPATIENT
Start: 2019-10-25 | End: 2020-03-19 | Stop reason: SDUPTHER

## 2020-03-19 RX ORDER — INSULIN LISPRO 100 [IU]/ML
INJECTION, SUSPENSION SUBCUTANEOUS
Qty: 10 PEN | Refills: 3 | Status: SHIPPED | OUTPATIENT
Start: 2020-03-19 | End: 2020-07-02 | Stop reason: SDUPTHER

## 2020-06-29 DIAGNOSIS — E11.9 TYPE 2 DIABETES MELLITUS WITHOUT COMPLICATION, WITHOUT LONG-TERM CURRENT USE OF INSULIN (HCC): ICD-10-CM

## 2020-06-29 LAB
ANION GAP SERPL CALCULATED.3IONS-SCNC: 10 MEQ/L (ref 9–15)
BUN BLDV-MCNC: 8 MG/DL (ref 8–23)
CALCIUM SERPL-MCNC: 9.3 MG/DL (ref 8.5–9.9)
CHLORIDE BLD-SCNC: 107 MEQ/L (ref 95–107)
CHOLESTEROL, TOTAL: 140 MG/DL (ref 0–199)
CO2: 23 MEQ/L (ref 20–31)
CREAT SERPL-MCNC: 0.52 MG/DL (ref 0.5–0.9)
GFR AFRICAN AMERICAN: >60
GFR NON-AFRICAN AMERICAN: >60
GLUCOSE BLD-MCNC: 134 MG/DL (ref 70–99)
HBA1C MFR BLD: 7.8 % (ref 4.8–5.9)
HDLC SERPL-MCNC: 51 MG/DL (ref 40–59)
LDL CHOLESTEROL CALCULATED: 64 MG/DL (ref 0–129)
POTASSIUM SERPL-SCNC: 3.9 MEQ/L (ref 3.4–4.9)
SODIUM BLD-SCNC: 140 MEQ/L (ref 135–144)
TRIGL SERPL-MCNC: 123 MG/DL (ref 0–150)

## 2020-07-02 ENCOUNTER — VIRTUAL VISIT (OUTPATIENT)
Dept: ENDOCRINOLOGY | Age: 60
End: 2020-07-02
Payer: MEDICARE

## 2020-07-02 PROCEDURE — 2022F DILAT RTA XM EVC RTNOPTHY: CPT | Performed by: INTERNAL MEDICINE

## 2020-07-02 PROCEDURE — 3051F HG A1C>EQUAL 7.0%<8.0%: CPT | Performed by: INTERNAL MEDICINE

## 2020-07-02 PROCEDURE — G8428 CUR MEDS NOT DOCUMENT: HCPCS | Performed by: INTERNAL MEDICINE

## 2020-07-02 PROCEDURE — 99213 OFFICE O/P EST LOW 20 MIN: CPT | Performed by: INTERNAL MEDICINE

## 2020-07-02 PROCEDURE — 3017F COLORECTAL CA SCREEN DOC REV: CPT | Performed by: INTERNAL MEDICINE

## 2020-07-02 RX ORDER — INSULIN LISPRO 100 [IU]/ML
INJECTION, SUSPENSION SUBCUTANEOUS
Qty: 10 PEN | Refills: 3 | Status: SHIPPED | OUTPATIENT
Start: 2020-07-02 | End: 2020-11-24 | Stop reason: SDUPTHER

## 2020-07-02 NOTE — PROGRESS NOTES
2020    TELEHEALTH EVALUATION -- Audio/Visual (During VRZXA-89 public health emergency)    Due to COVID 19 outbreak, patient's office visit was converted to a virtual visit. Patient was contacted and agreed to proceed with a virtual visit via Zelgory. me  The risks and benefits of converting to a virtual visit were discussed in light of the current infectious disease epidemic. Patient also understood that insurance coverage and co-pays are up to their individual insurance plans. HPI: Made aware is a video visit billable visit agreed to proceed patient was at home I was at my office    Odalis Hallmanan (:  1960) has requested an audio/video evaluation for the following concern(s):    Type 2 diabetes on Humalog 75/25 45 units twice a day insulin changed from NovoLog 70 30-70 525 Humalog blood sugars are staying in upper 100 range and no hypoglycemia testing 2 times daily A1c is gone up slightly to 7.8 lately      Results for Magdalene Crimes (MRN [id number]) as of 2020 15:46   Ref.  Range 10/3/2019 13:27 10/9/2019 10:45 2020 10:03   Sodium Latest Ref Range: 135 - 144 mEq/L 145 (H)  140   Potassium Latest Ref Range: 3.4 - 4.9 mEq/L 3.4  3.9   Chloride Latest Ref Range: 95 - 107 mEq/L 109 (H)  107   CO2 Latest Ref Range: 20 - 31 mEq/L 22  23   BUN Latest Ref Range: 8 - 23 mg/dL 12  8   Creatinine Latest Ref Range: 0.50 - 0.90 mg/dL 0.71  0.52   Anion Gap Latest Ref Range: 9 - 15 mEq/L 14  10   GFR Non- Latest Ref Range: >60  >60.0  >60.0   GFR African American Latest Ref Range: >60  >60.0  >60.0   Glucose Latest Ref Range: 70 - 99 mg/dL 64 (L) 214 134 (H)   Calcium Latest Ref Range: 8.5 - 9.9 mg/dL 9.2  9.3   Cholesterol, Total Latest Ref Range: 0 - 199 mg/dL   140   HDL Cholesterol Latest Ref Range: 40 - 59 mg/dL   51   LDL Calculated Latest Ref Range: 0 - 129 mg/dL   64   Triglycerides Latest Ref Range: 0 - 150 mg/dL   123   Hemoglobin A1C Latest Ref Range: 4.8 - 5.9 % 6.5 (H)  7.8 (H) Review of Systems    Prior to Visit Medications    Medication Sig Taking? Authorizing Provider   insulin lispro protamine & lispro (HUMALOG MIX 75/25 KWIKPEN) (75-25) 100 UNIT per ML SUPN injection pen Inject 45 units bid  Jena Paul De Jesus MD   insulin aspart protamine-insulin aspart (NOVOLOG MIX 70/30 FLEXPEN) (70-30) 100 UNIT/ML injection 45 units twice a day  Jean Paul De Jesus MD   Insulin Pen Needle (NOVOFINE) 32G X 6 MM MISC Use bid  Jean Paul De Jesus MD   ONE TOUCH ULTRA TEST strip TEST THREE TIMES DAILY AS NEEDED  Jean Paul De Jesus MD   gabapentin (NEURONTIN) 300 MG capsule Take 1 capsule by mouth Daily with supper for 30 days. Daria Leger MD   ciclopirox [facility]) 8 % solution Apply to affected toenails nightly. Remove built up layers once per week. Vicky Hunter DPM   ONE TOUCH ULTRA TEST strip TEST THREE TIMES DAILY AS NEEDED  Jean Paul Damon MD   atorvastatin (LIPITOR) 40 MG tablet TAKE 1 TABLET BY MOUTH DAILY  August Young MD   amLODIPine (NORVASC) 2.5 MG tablet TAKE 1 TABLET BY MOUTH DAILY  August Young MD   ibuprofen (ADVIL;MOTRIN) 800 MG tablet TAKE 1 TABLET BY MOUTH EVERY 8 HOURS AS NEEDED FOR PAIN  August Young MD   albuterol-ipratropium (COMBIVENT RESPIMAT)  MCG/ACT AERS inhaler Inhale 1 puff into the lungs every 4 hours as needed for Wheezing or Shortness of Breath  Destinee Amaya MD   Handicap Placard MISC by Does not apply route  August Young MD       Social History     Tobacco Use    Smoking status: Current Every Day Smoker     Packs/day: 1.00     Years: 43.00     Pack years: 43.00     Types: Cigarettes     Last attempt to quit: 3/16/2018     Years since quittin.2    Smokeless tobacco: Never Used   Substance Use Topics    Alcohol use: No    Drug use:  No            PHYSICAL EXAMINATION:  [ INSTRUCTIONS:  \"[x]\" Indicates a positive item  \"[]\" Indicates a negative item  -- DELETE ALL ITEMS NOT EXAMINED]  [] Alert  [] Oriented to person/place/time    [] No apparent distress  [] Toxic appearing    [] Face flushed appearing [] Sclera clear  [] Lips are cyanotic      [] Breathing appears normal  [] Appears tachypneic      [] Rash on visible skin    [] Cranial Nerves II-XII grossly intact    [] Motor grossly intact in visible upper extremities    [] Motor grossly intact in visible lower extremities    [] Normal Mood  [] Anxious appearing    [] Depressed appearing  [] Confused appearing      [] Poor short term memory  [] Poor long term memory    [] OTHER:      Due to this being a TeleHealth encounter, evaluation of the following organ systems is limited: Vitals/Constitutional/EENT/Resp/CV/GI//MS/Neuro/Skin/Heme-Lymph-Imm. ASSESSMENT/PLAN:   Diagnosis Orders   1. Type 2 diabetes mellitus without complication, without long-term current use of insulin ([facility])  Basic Metabolic Panel    Hemoglobin A1C    Microalbumin / Creatinine Urine Ratio     Orders Placed This Encounter   Procedures    Basic Metabolic Panel     Standing Status:   Future     Standing Expiration Date:   7/2/2021    Hemoglobin A1C     Standing Status:   Future     Standing Expiration Date:   7/2/2021    Microalbumin / Creatinine Urine Ratio     Standing Status:   Future     Standing Expiration Date:   7/2/2021     Orders Placed This Encounter   Medications    insulin lispro protamine & lispro (HUMALOG MIX 75/25 KWIKPEN) (75-25) 100 UNIT per ML SUPN injection pen     Sig: Inject 45 units bid     Dispense:  10 pen     Refill:  3    Insulin Pen Needle (NOVOFINE) 32G X 6 MM MISC     Sig: Use bid     Dispense:  100 each     Refill:  5     Continue 75/25 Humalog 45 units twice a day repeat labs in 3 months time A1c goal of less than 7    Total time spent with patient was 16 minutes  An  electronic signature was used to authenticate this note.     --Roberta Villalobos MD on 7/2/2020 at 3:45 PM        Pursuant to the emergency declaration under the [facility] [address] waiver authority and the

## 2020-08-03 RX ORDER — PEN NEEDLE, DIABETIC 32 GX 1/4"
NEEDLE, DISPOSABLE MISCELLANEOUS
Qty: 100 EACH | Refills: 5 | Status: SHIPPED | OUTPATIENT
Start: 2020-08-03 | End: 2021-03-30 | Stop reason: SDUPTHER

## 2020-08-20 ENCOUNTER — OFFICE VISIT (OUTPATIENT)
Dept: PODIATRY | Facility: CLINIC | Age: 60
End: 2020-08-20

## 2020-08-20 VITALS — TEMPERATURE: 97.8 F | WEIGHT: 230 LBS | BODY MASS INDEX: 38.32 KG/M2 | HEIGHT: 65 IN

## 2020-08-20 ASSESSMENT — ENCOUNTER SYMPTOMS
BACK PAIN: 1
NAUSEA: 0
DIARRHEA: 0
CONSTIPATION: 0
SHORTNESS OF BREATH: 0

## 2020-08-20 NOTE — PATIENT INSTRUCTIONS
Patient Education        Plantar Fasciitis: Exercises  Introduction  Here are some examples of exercises for you to try. The exercises may be suggested for a condition or for rehabilitation. Start each exercise slowly. Ease off the exercises if you start to have pain. You will be told when to start these exercises and which ones will work best for you. How to do the exercises  Towel stretch   1. Sit with your legs extended and knees straight. 2. Place a towel around your foot just under the toes. 3. Hold each end of the towel in each hand, with your hands above your knees. 4. Pull back with the towel so that your foot stretches toward you. 5. Hold the position for at least 15 to 30 seconds. 6. Repeat 2 to 4 times a session, up to 5 sessions a day. Calf stretch   This exercise stretches the muscles at the back of the lower leg (the calf) and the Achilles tendon. Do this exercise 3 or 4 times a day, 5 days a week. 1. Stand facing a wall with your hands on the wall at about eye level. Put the leg you want to stretch about a step behind your other leg. 2. Keeping your back heel on the floor, bend your front knee until you feel a stretch in the back leg. 3. Hold the stretch for 15 to 30 seconds. Repeat 2 to 4 times. Plantar fascia and calf stretch   Stretching the plantar fascia and calf muscles can increase flexibility and decrease heel pain. You can do this exercise several times each day and before and after activity. 1. Stand on a step as shown above. Be sure to hold on to the banister. 2. Slowly let your heels down over the edge of the step as you relax your calf muscles. You should feel a gentle stretch across the bottom of your foot and up the back of your leg to your knee. 3. Hold the stretch about 15 to 30 seconds, and then tighten your calf muscle a little to bring your heel back up to the level of the step. Repeat 2 to 4 times.     Towel curls   Make this exercise more challenging by

## 2020-08-20 NOTE — PROGRESS NOTES
KWIKPEN) (75-25) 100 UNIT per ML SUPN injection pen Inject 45 units bid 10 pen 3    insulin aspart protamine-insulin aspart (NOVOLOG MIX 70/30 FLEXPEN) (70-30) 100 UNIT/ML injection 45 units twice a day 10 pen 5    ONE TOUCH ULTRA TEST strip TEST THREE TIMES DAILY AS NEEDED 300 strip 1    gabapentin (NEURONTIN) 300 MG capsule Take 1 capsule by mouth Daily with supper for 30 days. 90 capsule 1    ciclopirox (PENLAC) 8 % solution Apply to affected toenails nightly. Remove built up layers once per week. 1 Bottle 5    ONE TOUCH ULTRA TEST strip TEST THREE TIMES DAILY AS NEEDED 200 strip 3    atorvastatin (LIPITOR) 40 MG tablet TAKE 1 TABLET BY MOUTH DAILY 90 tablet 3    amLODIPine (NORVASC) 2.5 MG tablet TAKE 1 TABLET BY MOUTH DAILY 90 tablet 3    ibuprofen (ADVIL;MOTRIN) 800 MG tablet TAKE 1 TABLET BY MOUTH EVERY 8 HOURS AS NEEDED FOR PAIN 200 tablet 3    albuterol-ipratropium (COMBIVENT RESPIMAT)  MCG/ACT AERS inhaler Inhale 1 puff into the lungs every 4 hours as needed for Wheezing or Shortness of Breath 1 Inhaler 3    Handicap Placard MISC by Does not apply route 1 each 0     No current facility-administered medications on file prior to visit.         Past Medical History:   Diagnosis Date    Benign neoplasm of colon 2015    DR Katlin Castellanos    COPD (chronic obstructive pulmonary disease) ([facility] [address] 2018    Hyperlipidemia     Hypertension     Type II or unspecified type diabetes mellitus without mention of complication, not stated as uncontrolled      Past Surgical History:   Procedure Laterality Date    BACK SURGERY       SECTION      2    COLONOSCOPY  2015    DR Katlin Castellanos - POLYPS needs 2018    FACIAL RECONSTRUCTION SURGERY Bilateral 10/16/2017    EXCISION AND LAYERED PLASTIC CLOSURE INCLUSION CYST RIGHT EARLOBE AND LEFT CHEEK performed by Juan Hastings MD at [facility] Right 2016    RIGHT L3-4 L4-5 L5-S1 LAMINECTOMY RIGHT L4-5 MICRODISKECTOMY performed by Score:   7    Physical Exam  Vascular:   Dorsalis pedis pulse is palpable bilateral foot. Posterior tibial pulse is palpable bilateral foot. There is nonpitting edema noted to the bilateral pretibial area. Capillary refill is immediate bilateral hallux. There are nbo varicosities noted bilaterally. There are no telangiectasia noted bilaterally. Skin temperature gradient is noted to be warm to warm bilaterally. There are no signs of ischemia or skin atrophy noted bilaterally. Hair growth is present to the hallux bilaterally.     Neurological:   Light touch sensation is altered to the right hallux. Protective sensation is intact bilaterally. Vibratory sensation is diminished bilaterally. Sharp/dull sensation is intact bilaterally. Patellar deep tendon reflex is graded at 2/4 bilaterally. Achilles tendon deep to the reflex is graded at 2/4 bilaterally. The Babinski response is negative bilaterally. No ankle clonus is noted bilaterally.     Dermatological:  No open lesions noted bilateral foot. The nails are are thickened, yellow in color, and have subungual debris bilateral foot. The nail plates are incurvated bilaterally.  There is pain on compression of the hallux nail bilaterally. There is no sign of bacterial infection or abscess formation. Xerotic skin texture noted bilaterally. Focal hyperkeratotic lesions noted to the bilateral hallux, 1st MPJ and the tip of the bilateral 2nd toe. Porokeratosis lesion noted ot eh right plantar foot at the 5th MPJ. Normal skin turgor noted bilaterally. Webspaces are intact bilateral foot. Mild erythema overlying the bunion deformity bilateral foot. Well healed surgical cicatrix right 1st ray.     Musculoskeletal:  Rectus foot type noted bilaterally. Manual muscle strength is graded at 5/5 for all groups tested bilateral foot. Hallux abductovalgus and tailor's bunion with adductovarus 5th digit.    Flexible flexion contracture noted to the PIPJ of toes 2-4 bilateral foot. No pain or crepitus noted with range of motion of the joints of the foot or ankle bilaterally. There is decreased ankle joint dorsiflexion noted bilaterally, this improves with knee flexion. There is tenderness to palpation of the left heel medial calcaneal tubercle, no pain with medial to lateral heel compression. Assessment:      Diagnosis Orders   1. Left foot pain  TRIM BENIGN HYPERKERATOTIC SKIN LESION,>4    GA DEBRIDEMENT OF NAILS, 6 OR MORE    GA STRAPPING; ANKLE &/OR FOOT    XR Foot Left Ap Lateral and Oblique Standing   2. Plantar fasciitis, left  GA STRAPPING; ANKLE &/OR FOOT    XR Foot Left Ap Lateral and Oblique Standing   3. Right foot pain  TRIM BENIGN HYPERKERATOTIC SKIN LESION,>4    GA DEBRIDEMENT OF NAILS, 6 OR MORE   4. Diabetic polyneuropathy associated with type 2 diabetes mellitus (HCC)  TRIM BENIGN HYPERKERATOTIC SKIN LESION,>4    GA DEBRIDEMENT OF NAILS, 6 OR MORE   5. Callus  TRIM BENIGN HYPERKERATOTIC SKIN LESION,>4   6. Dermatophytosis of nail  GA DEBRIDEMENT OF NAILS, 6 OR MORE       Plan:     Plantar fasciitis:  I discussed the nature and etiology of the condition with the patient in detail. Posterior calf stretching exercises were demonstrated to patient and these are to be performed three times per day, a detailed home exercise plan was dispensed to the patient. An icepack is to be applied to the heel for 10 minutes after the stretching is performed. A strapping was applied to the left foot. The patient is to refrain from barefoot walking and wear a supportive shoe. X-rays of the left foot ordered. Calluses: The hyperkeratotic lesions located at the bilateral hallux, bilateral first MPJ, bilateral second toe, and right 5th MPJ were pared to the level of normal skin with a #15 blade scalpel without incident. The patient is instructed to monitor for signs of infection and call immediately if these arise.  Continue use of Lac-Hydrin to the callused areas twice daily.     Onychomycosis: Nail plates 1-5 bilaterally were mechanically and electrically debrided to the level of normal underlying nail bed.  The patient was instructed to attempt use of an emery board to maintain the length and thickness of their nails as best as possible if able. Continue ciclopirox topical antifungal solution to the toenails nightly. Call immediately should any problems arise.        Orders Placed This Encounter   Procedures    XR Foot Left Ap Lateral and Oblique Standing     Standing Status:   Future     Number of Occurrences:   1     Standing Expiration Date:   8/20/2021     Order Specific Question:   Reason for exam:     Answer:   heel pain    TRIM BENIGN HYPERKERATOTIC SKIN LESION,>4    VA DEBRIDEMENT OF NAILS, 6 OR MORE    VA STRAPPING; ANKLE &/OR FOOT         Follow up:  Return in about 2 weeks (around 9/3/2020). Joann Santana DPM      Please note that this report has been partially produced using speech recognition software which may cause errors including grammar, punctuation, and spelling or words and phrases that may seem inappropriate. If there are questions or concerns please feel free to contact me for clarification.

## 2020-09-03 ENCOUNTER — OFFICE VISIT (OUTPATIENT)
Dept: PODIATRY | Facility: CLINIC | Age: 60
End: 2020-09-03

## 2020-09-03 VITALS — WEIGHT: 230 LBS | HEIGHT: 64 IN | TEMPERATURE: 97.7 F | BODY MASS INDEX: 39.27 KG/M2

## 2020-09-03 ASSESSMENT — ENCOUNTER SYMPTOMS
NAUSEA: 0
DIARRHEA: 0
CONSTIPATION: 0
BACK PAIN: 1
SHORTNESS OF BREATH: 0

## 2020-09-03 NOTE — PROGRESS NOTES
Faviola Alert  (1960)    9/3/20    Subjective     Faviola Alert is 62 y.o. female who complains today of:    Chief Complaint   Patient presents with    Foot Pain     Left       HPI: The patient presents for follow-up of plantar fasciitis. She reports continued pain. She states that the strapping was effective until approximately 1 week ago. She states that she had x-rays performed since her last visit. Patient continues to have sharp pain with first steps after periods of rest, the pain is aching/throbbing at rest.    Review of Systems   Constitutional: Negative for fever. HENT: Negative for hearing loss. Respiratory: Negative for shortness of breath. Gastrointestinal: Negative for constipation, diarrhea and nausea. Genitourinary: Negative for difficulty urinating. Musculoskeletal: Positive for back pain. Negative for neck pain. Skin: Negative for rash. Neurological: Negative for headaches. Hematological: Does not bruise/bleed easily. Psychiatric/Behavioral: Negative for sleep disturbance. She has not tried physical therapy. Date of last of last PT treatment: none    The patient is a diabetic. PCP/ Endocrinologist: Dr Linette Bowles   Date last seen: 07/02/2020    Allergies:  Metformin and related and Norco [hydrocodone-acetaminophen]    Current Outpatient Medications on File Prior to Visit   Medication Sig Dispense Refill    Insulin Pen Needle (BD PEN NEEDLE MICRO U/F) 32G X 6 MM MISC USE TWICE DAILY 100 each 5    insulin lispro protamine & lispro (HUMALOG MIX 75/25 KWIKPEN) (75-25) 100 UNIT per ML SUPN injection pen Inject 45 units bid 10 pen 3    insulin aspart protamine-insulin aspart (NOVOLOG MIX 70/30 FLEXPEN) (70-30) 100 UNIT/ML injection 45 units twice a day 10 pen 5    ONE TOUCH ULTRA TEST strip TEST THREE TIMES DAILY AS NEEDED 300 strip 1    gabapentin (NEURONTIN) 300 MG capsule Take 1 capsule by mouth Daily with supper for 30 days.  90 capsule 1    ciclopirox (PENLAC) 8 % solution Apply to affected toenails nightly. Remove built up layers once per week. 1 Bottle 5    ONE TOUCH ULTRA TEST strip TEST THREE TIMES DAILY AS NEEDED 200 strip 3    atorvastatin (LIPITOR) 40 MG tablet TAKE 1 TABLET BY MOUTH DAILY 90 tablet 3    amLODIPine (NORVASC) 2.5 MG tablet TAKE 1 TABLET BY MOUTH DAILY 90 tablet 3    ibuprofen (ADVIL;MOTRIN) 800 MG tablet TAKE 1 TABLET BY MOUTH EVERY 8 HOURS AS NEEDED FOR PAIN 200 tablet 3    albuterol-ipratropium (COMBIVENT RESPIMAT)  MCG/ACT AERS inhaler Inhale 1 puff into the lungs every 4 hours as needed for Wheezing or Shortness of Breath 1 Inhaler 3    Handicap Placard MISC by Does not apply route 1 each 0     No current facility-administered medications on file prior to visit.         Past Medical History:   Diagnosis Date    Benign neoplasm of colon 2015    DR Elie Jerez    [facility]    Hyperlipidemia     Hypertension     Type II or unspecified type diabetes mellitus without mention of complication, not stated as uncontrolled      Past Surgical History:   Procedure Laterality Date    BACK SURGERY       SECTION      2    COLONOSCOPY  2015    DR Elie Jerez - POLYPS needs 2018    FACIAL RECONSTRUCTION SURGERY Bilateral 10/16/2017    EXCISION AND LAYERED PLASTIC CLOSURE INCLUSION CYST RIGHT EARLOBE AND LEFT CHEEK performed by Horacio Prado MD at [facility] Right 2016    RIGHT L3-4 L4-5 L5-S1 LAMINECTOMY RIGHT L4-5 MICRODISKECTOMY performed by Gloria Pal MD at [address] [place] [address] SCRN NOT  [address] [place] [address] N/A 2018    COLONOSCOPY razack polyp 2023 next     Social History     Socioeconomic History    Marital status:      Spouse name: Not on file    Number of children: Not on file    Years of education: Not on file    Highest education level: Not on file   Occupational History    Not on file   Social Needs    Financial resource strain: Not on file    Food insecurity     Worry: Not on file     Inability: Not on file    Transportation needs     Medical: Not on file     Non-medical: Not on file   Tobacco Use    Smoking status: Current Every Day Smoker     Packs/day: 1.00     Years: 43.00     Pack years: 43.00     Types: Cigarettes     Last attempt to quit: 3/16/2018     Years since quittin.4    Smokeless tobacco: Never Used   Substance and Sexual Activity    Alcohol use: No    Drug use: No    Sexual activity: Not on file   Lifestyle    Physical activity     Days per week: Not on file     Minutes per session: Not on file    Stress: Not on file   Relationships    Social connections     Talks on phone: Not on file     Gets together: Not on file     Attends Jew service: Not on file     Active member of club or organization: Not on file     Attends meetings of clubs or organizations: Not on file     Relationship status: Not on file    Intimate partner violence     Fear of current or ex partner: Not on file     Emotionally abused: Not on file     Physically abused: Not on file     Forced sexual activity: Not on file   Other Topics Concern    Not on file   Social History Narrative    Not on file     Family History   Problem Relation Age of Onset    Diabetes Mother     High Blood Pressure Mother     High Cholesterol Mother     Thyroid Disease Mother     Diabetes Father            Objective:   Vitals:  Temp 97.7 °F (36.5 °C)   Ht 5' 4\" (1.626 m)   Wt 230 lb (104.3 kg)   LMP  (LMP Unknown)   BMI 39.48 kg/m² Pain Score:   6    Physical Exam  Vascular:   Dorsalis pedis pulse is palpable bilateral foot. Posterior tibial pulse is palpable bilateral foot. There is nonpitting edema noted to the bilateral pretibial area. Capillary refill is immediate bilateral hallux. There are nbo varicosities noted bilaterally. There are no telangiectasia noted bilaterally. Skin temperature gradient is noted to be warm to warm bilaterally.   There are no signs of ischemia or skin atrophy noted bilaterally. Hair growth is present to the hallux bilaterally.     Neurological:   Light touch sensation is altered to the right hallux. Protective sensation is intact bilaterally. Vibratory sensation is diminished bilaterally. Sharp/dull sensation is intact bilaterally. Patellar deep tendon reflex is graded at 2/4 bilaterally. Achilles tendon deep to the reflex is graded at 2/4 bilaterally. The Babinski response is negative bilaterally. No ankle clonus is noted bilaterally.     Dermatological:  No open lesions noted bilateral foot. The nails are are thickened, yellow in color, and have subungual debris bilateral foot. The nail plates are incurvated bilaterally.  There is pain on compression of the hallux nail bilaterally. There is no sign of bacterial infection or abscess formation. Xerotic skin texture noted bilaterally. Focal hyperkeratotic lesions noted to the bilateral hallux, 1st MPJ and the tip of the bilateral 2nd toe. Porokeratosis lesion noted ot eh right plantar foot at the 5th MPJ. Normal skin turgor noted bilaterally. Webspaces are intact bilateral foot. Mild erythema overlying the bunion deformity bilateral foot. Well healed surgical cicatrix right 1st ray.     Musculoskeletal:  Rectus foot type noted bilaterally. Manual muscle strength is graded at 5/5 for all groups tested bilateral foot. Hallux abductovalgus and tailor's bunion with adductovarus 5th digit. Flexible flexion contracture noted to the PIPJ of toes 2-4 bilateral foot. No pain or crepitus noted with range of motion of the joints of the foot or ankle bilaterally. There is decreased ankle joint dorsiflexion noted bilaterally, this improves with knee flexion. There is tenderness to palpation of the left heel medial calcaneal tubercle, no pain with medial to lateral heel compression. Radiographs:                      Assessment:      Diagnosis Orders   1.  Left foot pain DE STRAPPING; ANKLE &/OR FOOT    DE STATIC OR DYNAMI AFO PRE OTS   2. Plantar fasciitis, left  DE STRAPPING; ANKLE &/OR FOOT    DE STATIC OR DYNAMI AFO PRE OTS       Plan:     X-rays reviewed with the patient. A new strapping was manufactured/applied to the left foot. I have recommended formal physical therapy, the patient defers at this time, she is instructed to perform home exercise plan and ice on a daily basis. I have ordered a night splint. Orders Placed This Encounter   Procedures    DE STRAPPING; ANKLE &/OR FOOT    DE STATIC OR DYNAMI AFO PRE OTS     Standing Status:   Future     Standing Expiration Date:   12/3/2020         Follow up:  Return in about 2 weeks (around 9/17/2020). Marlena Lyn DPM      Please note that this report has been partially produced using speech recognition software which may cause errors including grammar, punctuation, and spelling or words and phrases that may seem inappropriate. If there are questions or concerns please feel free to contact me for clarification.

## 2020-09-24 ENCOUNTER — OFFICE VISIT (OUTPATIENT)
Dept: PODIATRY | Facility: CLINIC | Age: 60
End: 2020-09-24

## 2020-09-24 VITALS — HEIGHT: 64 IN | TEMPERATURE: 97.4 F | WEIGHT: 230 LBS | BODY MASS INDEX: 39.27 KG/M2

## 2020-09-24 ASSESSMENT — ENCOUNTER SYMPTOMS
SHORTNESS OF BREATH: 0
CONSTIPATION: 0
NAUSEA: 0
DIARRHEA: 0
BACK PAIN: 1

## 2020-09-24 NOTE — PROGRESS NOTES
Emily Martell  (1960)    9/3/20    Subjective     Emily Martell is 62 y.o. female who complains today of:    Chief Complaint   Patient presents with    Foot Pain     LEFT    Ingrown Toenail     RIGHT     HPI: patient presents for follow-up for left foot plantar fasciitis. Patient reports an incremental decrease in her pain level. Patient has been stretching, icing, and she has begun wearing the night splint. She was continued with decreased pain with first steps after periods of inactivity. Patient states that she has the foot strapping, recurrent strep was loosened and flattened out. Patient also complains of painful ingrowing toenails to the right second and third toes, she has not observed signs of infection. This is a chronic problem. Foot Pain    Pertinent negatives include no fever. Review of Systems   Constitutional: Negative for fever. HENT: Negative for hearing loss. Respiratory: Negative for shortness of breath. Gastrointestinal: Negative for constipation, diarrhea and nausea. Genitourinary: Negative for difficulty urinating. Musculoskeletal: Positive for back pain. Negative for neck pain. Skin: Negative for rash. Neurological: Negative for headaches. Hematological: Does not bruise/bleed easily. Psychiatric/Behavioral: Negative for sleep disturbance. She has not tried physical therapy. Date of last of last PT treatment: none    The patient is a diabetic.    PCP/ Endocrinologist: Dr Ivy   Date last seen: 07/02/2020    Allergies:  Metformin and related and Norco [hydrocodone-acetaminophen]    Current Outpatient Medications on File Prior to Visit   Medication Sig Dispense Refill    Insulin Pen Needle (BD PEN NEEDLE MICRO U/F) 32G X 6 MM MISC USE TWICE DAILY 100 each 5    insulin lispro protamine & lispro (HUMALOG MIX 75/25 KWIKPEN) (75-25) 100 UNIT per ML SUPN injection pen Inject 45 units bid 10 pen 3    insulin aspart protamine-insulin aspart ([employer] MIX 70/30 FLEXPEN) (70-30) 100 UNIT/ML injection 45 units twice a day 10 pen 5    ONE TOUCH ULTRA TEST strip TEST THREE TIMES DAILY AS NEEDED 300 strip 1    gabapentin (NEURONTIN) 300 MG capsule Take 1 capsule by mouth Daily with supper for 30 days. 90 capsule 1    ciclopirox (PENLAC) 8 % solution Apply to affected toenails nightly. Remove built up layers once per week. 1 Bottle 5    ONE TOUCH ULTRA TEST strip TEST THREE TIMES DAILY AS NEEDED 200 strip 3    atorvastatin (LIPITOR) 40 MG tablet TAKE 1 TABLET BY MOUTH DAILY 90 tablet 3    amLODIPine (NORVASC) 2.5 MG tablet TAKE 1 TABLET BY MOUTH DAILY 90 tablet 3    ibuprofen (ADVIL;MOTRIN) 800 MG tablet TAKE 1 TABLET BY MOUTH EVERY 8 HOURS AS NEEDED FOR PAIN 200 tablet 3    albuterol-ipratropium (COMBIVENT RESPIMAT)  MCG/ACT AERS inhaler Inhale 1 puff into the lungs every 4 hours as needed for Wheezing or Shortness of Breath 1 Inhaler 3    Handicap Placard MISC by Does not apply route 1 each 0     No current facility-administered medications on file prior to visit.         Past Medical History:   Diagnosis Date    Benign neoplasm of colon 2015    DR Neha Ruiz    COPD (chronic obstructive pulmonary disease) ([facility] [address] 2018    Hyperlipidemia     Hypertension     Type II or unspecified type diabetes mellitus without mention of complication, not stated as uncontrolled      Past Surgical History:   Procedure Laterality Date    BACK SURGERY       SECTION      2    COLONOSCOPY  2015    DR Neha Ruiz - POLYPS needs 2018    FACIAL RECONSTRUCTION SURGERY Bilateral 10/16/2017    EXCISION AND LAYERED PLASTIC CLOSURE INCLUSION CYST RIGHT EARLOBE AND LEFT CHEEK performed by Ana Lilia Goodman MD at [facility] Right 2016    RIGHT L3-4 L4-5 L5-S1 LAMINECTOMY RIGHT L4-5 MICRODISKECTOMY performed by Shaun Patton MD at [address] [facility] SCRN NOT  [address] [place] [address] N/A 2018    COLONOSCOPY razack polyp  [facility] is palpable bilateral foot. There is nonpitting edema noted to the bilateral pretibial area. Capillary refill is immediate bilateral hallux. There are nbo varicosities noted bilaterally. There are no telangiectasia noted bilaterally. Skin temperature gradient is noted to be warm to warm bilaterally. There are no signs of ischemia or skin atrophy noted bilaterally. Hair growth is present to the hallux bilaterally.     Neurological:   Light touch sensation is altered to the right hallux. Protective sensation is intact bilaterally. Vibratory sensation is diminished bilaterally. Sharp/dull sensation is intact bilaterally. Patellar deep tendon reflex is graded at 2/4 bilaterally. Achilles tendon deep to the reflex is graded at 2/4 bilaterally. The Babinski response is negative bilaterally. No ankle clonus is noted bilaterally.     Dermatological:  No open lesions noted bilateral foot. The nails are are thickened, yellow in color, and have subungual debris bilateral foot. The nail plates are incurvated bilaterally.  There is pain on compression of the right 2nd and 3rd nail. There are no signs of bacterial infection or abscess formation. Xerotic skin texture noted bilaterally. Focal hyperkeratotic lesions noted to the bilateral hallux, 1st MPJ and the tip of the bilateral 2nd toe. Porokeratosis lesion noted to the right plantar foot at the 5th MPJ. Normal skin turgor noted bilaterally. Webspaces are intact bilateral foot. Mild erythema overlying the bunion deformity bilateral foot. Well healed surgical cicatrix right 1st ray.     Musculoskeletal:  Rectus foot type noted bilaterally. Manual muscle strength is graded at 5/5 for all groups tested bilateral foot. Hallux abductovalgus and tailor's bunion with adductovarus 5th digit. Flexible flexion contracture noted to the PIPJ of toes 2-4 bilateral foot.   No pain or crepitus noted with range of motion of the joints of the foot or ankle bilaterally. There is decreased ankle joint dorsiflexion noted bilaterally. There is tenderness to palpation of the left heel medial calcaneal tubercle. Assessment:      Diagnosis Orders   1. Left foot pain  KS STRAPPING; ANKLE &/OR FOOT   2. Plantar fasciitis, left  KS STRAPPING; ANKLE &/OR FOOT   3. Pain in toe of right foot     4. Ingrowing nail         Plan:     Left foot plantar fasciitis: The patient is instructed to continue icing, stretching, and use of the night splint. The patient is also encouraged to wear her diabetic shoes and insoles while in her house. A new strapping was manufactured and applied to the left foot. Right second and third ingrow nail: the lateral border of the right second and third nails were debrided in a slant back fashion, no bacterial infection noted. Antibiotic ointment applied. Patient instructed to monitor for signs of infection and call immediately if these arise. Orders Placed This Encounter   Procedures    KS STRAPPING; ANKLE &/OR FOOT         Follow up:  Return in about 4 weeks (around 10/22/2020). Sulma Mariscal DPM      Please note that this report has been partially produced using speech recognition software which may cause errors including grammar, punctuation, and spelling or words and phrases that may seem inappropriate. If there are questions or concerns please feel free to contact me for clarification.

## 2020-09-29 DIAGNOSIS — E11.9 TYPE 2 DIABETES MELLITUS WITHOUT COMPLICATION, WITHOUT LONG-TERM CURRENT USE OF INSULIN (HCC): ICD-10-CM

## 2020-09-29 LAB
ANION GAP SERPL CALCULATED.3IONS-SCNC: 12 MEQ/L (ref 9–15)
BUN BLDV-MCNC: 9 MG/DL (ref 8–23)
CALCIUM SERPL-MCNC: 9 MG/DL (ref 8.5–9.9)
CHLORIDE BLD-SCNC: 108 MEQ/L (ref 95–107)
CO2: 23 MEQ/L (ref 20–31)
CREAT SERPL-MCNC: 0.53 MG/DL (ref 0.5–0.9)
CREATININE URINE: 98.5 MG/DL
GFR AFRICAN AMERICAN: >60
GFR NON-AFRICAN AMERICAN: >60
GLUCOSE BLD-MCNC: 111 MG/DL (ref 70–99)
HBA1C MFR BLD: 7.9 % (ref 4.8–5.9)
MICROALBUMIN UR-MCNC: <1.2 MG/DL
MICROALBUMIN/CREAT UR-RTO: NORMAL MG/G (ref 0–30)
POTASSIUM SERPL-SCNC: 3.7 MEQ/L (ref 3.4–4.9)
SODIUM BLD-SCNC: 143 MEQ/L (ref 135–144)

## 2020-10-01 ENCOUNTER — VIRTUAL VISIT (OUTPATIENT)
Dept: ENDOCRINOLOGY | Age: 60
End: 2020-10-01
Payer: MEDICARE

## 2020-10-01 PROCEDURE — 2022F DILAT RTA XM EVC RTNOPTHY: CPT | Performed by: INTERNAL MEDICINE

## 2020-10-01 PROCEDURE — 3051F HG A1C>EQUAL 7.0%<8.0%: CPT | Performed by: INTERNAL MEDICINE

## 2020-10-01 PROCEDURE — 3017F COLORECTAL CA SCREEN DOC REV: CPT | Performed by: INTERNAL MEDICINE

## 2020-10-01 PROCEDURE — G8428 CUR MEDS NOT DOCUMENT: HCPCS | Performed by: INTERNAL MEDICINE

## 2020-10-01 PROCEDURE — 99213 OFFICE O/P EST LOW 20 MIN: CPT | Performed by: INTERNAL MEDICINE

## 2020-10-01 NOTE — PROGRESS NOTES
10/1/2020    TELEHEALTH EVALUATION -- Audio/Visual (During AYTPR-20 public health emergency)    Due to COVID 19 outbreak, patient's office visit was converted to a virtual visit. Patient was contacted and agreed to proceed with a virtual visit via [email or web address] me  The risks and benefits of converting to a virtual visit were discussed in light of the current infectious disease epidemic. Patient also understood that insurance coverage and co-pays are up to their individual insurance plans. HPI: Patient made aware video visit  patient was at home I was at my office    Sanjuanita Clinton (:  1960) has requested an audio/video evaluation for the following concern(s):    Type 2 diabetes on 75/25 Humalog 45 units twice daily testing blood sugar 2 times a day averages are in the mid to upper 100s no hypoglycemia A1c stable reviewed microalbumin chemistries were stable will get her flu vaccination in the next few weeks  Complication diabetes include neuropathy    Results for Chinmay Mistry (MRN [id number]) as of 10/1/2020 10:46   Ref.  Range 2020 07:27 2020 07:35   Sodium Latest Ref Range: 135 - 144 mEq/L 143    Potassium Latest Ref Range: 3.4 - 4.9 mEq/L 3.7    Chloride Latest Ref Range: 95 - 107 mEq/L 108 (H)    CO2 Latest Ref Range: 20 - 31 mEq/L 23    BUN Latest Ref Range: 8 - 23 mg/dL 9    Creatinine Latest Ref Range: 0.50 - 0.90 mg/dL 0.53    Anion Gap Latest Ref Range: 9 - 15 mEq/L 12    GFR Non- Latest Ref Range: >60  >60.0    GFR African American Latest Ref Range: >60  >60.0    Glucose Latest Ref Range: 70 - 99 mg/dL 111 (H)    Calcium Latest Ref Range: 8.5 - 9.9 mg/dL 9.0    Hemoglobin A1C Latest Ref Range: 4.8 - 5.9 % 7.9 (H)    Creatinine, Ur Latest Ref Range: Not Established mg/dL  98.5   Microalbumin Creatinine Ratio Latest Ref Range: 0.0 - 30.0 mg/G  see below   Microalbumin, Random Urine Latest Ref Range: Not Established mg/dL  <1.20       Review of Systems    Prior to Visit Medications Medication Sig Taking? Authorizing Provider   Insulin Pen Needle (BD PEN NEEDLE MICRO U/F) 32G X 6 MM MISC USE TWICE DAILY  Jean Paul De Jesus MD   insulin lispro protamine & lispro (HUMALOG MIX 75/25 KWIKPEN) (75-25) 100 UNIT per ML SUPN injection pen Inject 45 units bid  Drew Faria MD   insulin aspart protamine-insulin aspart (NOVOLOG MIX 70/30 FLEXPEN) (70-30) 100 UNIT/ML injection 45 units twice a day  Jean Paul De Jesus MD   ONE TOUCH ULTRA TEST strip TEST THREE TIMES DAILY AS NEEDED  Jean Paul De Jesus MD   gabapentin (NEURONTIN) 300 MG capsule Take 1 capsule by mouth Daily with supper for 30 days. Rhonda Merritt MD   ciclopirox [facility]) 8 % solution Apply to affected toenails nightly. Remove built up layers once per week. Dariusz Ch DPM   ONE TOUCH ULTRA TEST strip TEST THREE TIMES DAILY AS NEEDED  Jean Paul Turner MD   atorvastatin (LIPITOR) 40 MG tablet TAKE 1 TABLET BY MOUTH DAILY  Dasha Dalal MD   amLODIPine (NORVASC) 2.5 MG tablet TAKE 1 TABLET BY MOUTH DAILY  Dasha Dalal MD   ibuprofen (ADVIL;MOTRIN) 800 MG tablet TAKE 1 TABLET BY MOUTH EVERY 8 HOURS AS NEEDED FOR PAIN  Dasha Dalal MD   albuterol-ipratropium (COMBIVENT RESPIMAT)  MCG/ACT AERS inhaler Inhale 1 puff into the lungs every 4 hours as needed for Wheezing or Shortness of Breath  Melvin Mondragon MD   Handicap Placard MISC by Does not apply route  Dasha Dalal MD       Social History     Tobacco Use    Smoking status: Current Every Day Smoker     Packs/day: 1.00     Years: 43.00     Pack years: 43.00     Types: Cigarettes     Last attempt to quit: 3/16/2018     Years since quittin.5    Smokeless tobacco: Never Used   Substance Use Topics    Alcohol use: No    Drug use:  No            PHYSICAL EXAMINATION:  [ INSTRUCTIONS:  \"[x]\" Indicates a positive item  \"[]\" Indicates a negative item  -- DELETE ALL ITEMS NOT EXAMINED]  [] Alert  [] Oriented to person/place/time    [] No apparent distress  [] Toxic appearing    [] Face flushed appearing [] Sclera clear  [] Lips are cyanotic      [] Breathing appears normal  [] Appears tachypneic      [] Rash on visible skin    [] Cranial Nerves II-XII grossly intact    [] Motor grossly intact in visible upper extremities    [] Motor grossly intact in visible lower extremities    [] Normal Mood  [] Anxious appearing    [] Depressed appearing  [] Confused appearing      [] Poor short term memory  [] Poor long term memory    [] OTHER:      Due to this being a TeleHealth encounter, evaluation of the following organ systems is limited: Vitals/Constitutional/EENT/Resp/CV/GI//MS/Neuro/Skin/Heme-Lymph-Imm. ASSESSMENT/PLAN:     Diagnosis Orders   1. Type 2 diabetes mellitus with other specified complication, with long-term current use of insulin ([facility] [address]       Orders Placed This Encounter   Procedures    Basic Metabolic Panel     Standing Status:   Future     Standing Expiration Date:   10/1/2021    Hemoglobin A1C     Standing Status:   Future     Standing Expiration Date:   10/1/2021       Continue 75/25 Humalog 45 units twice daily repeat labs in 3 months A1c goal of 7 or lower    Total time spent with patient was 15 minutes    An  electronic signature was used to authenticate this note. --Jordi Bales MD on 10/1/2020 at 10:46 AM        Pursuant to the emergency declaration under the [facility], [facility] waiver authority and the [employer] and [employer] Act, this Virtual  Visit was conducted, with patient's consent, to reduce the patient's risk of exposure to COVID-19 and provide continuity of care for an established patient. Services were provided through a video synchronous discussion virtually to substitute for in-person clinic visit.

## 2020-11-24 RX ORDER — INSULIN LISPRO 100 [IU]/ML
INJECTION, SUSPENSION SUBCUTANEOUS
Qty: 10 PEN | Refills: 3 | Status: SHIPPED | OUTPATIENT
Start: 2020-11-24 | End: 2021-03-30 | Stop reason: SDUPTHER

## 2021-03-19 DIAGNOSIS — E11.69 TYPE 2 DIABETES MELLITUS WITH OTHER SPECIFIED COMPLICATION, WITH LONG-TERM CURRENT USE OF INSULIN (HCC): ICD-10-CM

## 2021-03-19 DIAGNOSIS — Z79.4 TYPE 2 DIABETES MELLITUS WITH OTHER SPECIFIED COMPLICATION, WITH LONG-TERM CURRENT USE OF INSULIN (HCC): ICD-10-CM

## 2021-03-19 LAB
ANION GAP SERPL CALCULATED.3IONS-SCNC: 12 MEQ/L (ref 9–15)
BUN BLDV-MCNC: 10 MG/DL (ref 8–23)
CALCIUM SERPL-MCNC: 9.1 MG/DL (ref 8.5–9.9)
CHLORIDE BLD-SCNC: 108 MEQ/L (ref 95–107)
CO2: 22 MEQ/L (ref 20–31)
CREAT SERPL-MCNC: 0.53 MG/DL (ref 0.5–0.9)
GFR AFRICAN AMERICAN: >60
GFR NON-AFRICAN AMERICAN: >60
GLUCOSE BLD-MCNC: 116 MG/DL (ref 70–99)
HBA1C MFR BLD: 7.9 % (ref 4.8–5.9)
POTASSIUM SERPL-SCNC: 3.8 MEQ/L (ref 3.4–4.9)
SODIUM BLD-SCNC: 142 MEQ/L (ref 135–144)

## 2021-03-30 ENCOUNTER — VIRTUAL VISIT (OUTPATIENT)
Dept: ENDOCRINOLOGY | Age: 61
End: 2021-03-30
Payer: MEDICARE

## 2021-03-30 DIAGNOSIS — E11.69 TYPE 2 DIABETES MELLITUS WITH OTHER SPECIFIED COMPLICATION, WITH LONG-TERM CURRENT USE OF INSULIN (HCC): Primary | ICD-10-CM

## 2021-03-30 DIAGNOSIS — Z79.4 TYPE 2 DIABETES MELLITUS WITH OTHER SPECIFIED COMPLICATION, WITH LONG-TERM CURRENT USE OF INSULIN (HCC): Primary | ICD-10-CM

## 2021-03-30 PROCEDURE — 99442 PR PHYS/QHP TELEPHONE EVALUATION 11-20 MIN: CPT | Performed by: INTERNAL MEDICINE

## 2021-03-30 RX ORDER — PEN NEEDLE, DIABETIC 32 GX 1/4"
NEEDLE, DISPOSABLE MISCELLANEOUS
Qty: 100 EACH | Refills: 5 | Status: SHIPPED | OUTPATIENT
Start: 2021-03-30 | End: 2021-06-08 | Stop reason: SDUPTHER

## 2021-03-30 RX ORDER — INSULIN LISPRO 100 [IU]/ML
INJECTION, SUSPENSION SUBCUTANEOUS
Qty: 10 PEN | Refills: 3
Start: 2021-03-30 | End: 2021-04-21 | Stop reason: SDUPTHER

## 2021-03-30 NOTE — PROGRESS NOTES
3/30/2021    TELEHEALTH EVALUATION -- Audio/Visual (During -08 public health emergency)    Due to [place] 19 outbreak, patient's office visit was converted to a virtual visit. Patient was contacted and agreed to proceed with a virtual visit via Telephone Visit  The risks and benefits of converting to a virtual visit were discussed in light of the current infectious disease epidemic. Patient also understood that insurance coverage and co-pays are up to their individual insurance plans. HPI: Telephone visit patient was at home    My office    Bakari Manning (:  1960) has requested an audio/video evaluation for the following concern(s):    Follow-up on type 2 diabetes patient on 75/25 Humalog 45 units twice a day A1c has been fluctuating between 6.5-8 patient has higher postprandial blood sugars labs were reviewed chemistries were normal testing 1-2 times daily complication diabetes include neuropathy    Results for Leonia Lesches (MRN [id number]) as of 3/30/2021 11:43   Ref. Range 2019 10:56 10/3/2019 13:27 2020 10:03 2020 07:27 3/19/2021 09:04   Hemoglobin A1C Latest Ref Range: 4.8 - 5.9 % 7.3 (H) 6.5 (H) 7.8 (H) 7.9 (H) 7.9 (H)       Results for Leonia Lesches (MRN [id number]) as of 3/30/2021 11:43   Ref.  Range 10/3/2019 13:27 10/9/2019 10:45 2020 10:03 2020 07:27 3/19/2021 09:04   Sodium Latest Ref Range: 135 - 144 mEq/L 145 (H)  140 143 142   Potassium Latest Ref Range: 3.4 - 4.9 mEq/L 3.4  3.9 3.7 3.8   Chloride Latest Ref Range: 95 - 107 mEq/L 109 (H)  107 108 (H) 108 (H)   CO2 Latest Ref Range: 20 - 31 mEq/L 22  23 23 22   BUN Latest Ref Range: 8 - 23 mg/dL 12  8 9 10   Creatinine Latest Ref Range: 0.50 - 0.90 mg/dL 0.71  0.52 0.53 0.53   Anion Gap Latest Ref Range: 9 - 15 mEq/L 14  10 12 12   GFR Non- Latest Ref Range: >60  >60.0  >60.0 >60.0 >60.0   GFR African American Latest Ref Range: >60  >60.0  >60.0 >60.0 >60.0   Glucose Latest Ref Range: 70 - 99 mg/dL 64 (L) 214 134 (H) 111 (H) 116 (H)   Calcium Latest Ref Range: 8.5 - 9.9 mg/dL 9.2  9.3 9.0 9.1     Patient Active Problem List   Diagnosis    Essential hypertension    Obesity    Tubular adenoma of colon    Type 2 diabetes mellitus without complication, without long-term current use of insulin (HCC)    Mixed hyperlipidemia    DDD (degenerative disc disease), lumbosacral    Lumbar canal stenosis    Herniated lumbar intervertebral disc    Facet hypertrophy of lumbar region    Osteoarthritis of spine with radiculopathy, lumbar region    Lumbosacral spondylosis without myelopathy    Acute respiratory failure (HCC)    Respiratory failure (HCC)    Chronic obstructive pulmonary disease ([facility] [address]    Diabetic polyneuropathy associated with type 2 diabetes mellitus (HCC)    Callus    Dermatophytosis of nail    Hallux abducto valgus, bilateral    Tailor's bunionette, bilateral         Review of Systems   Respiratory: Positive for shortness of breath. Cardiovascular: Negative. Endocrine: Negative. Musculoskeletal: Positive for arthralgias. All other systems reviewed and are negative. Prior to Visit Medications    Medication Sig Taking? Authorizing Provider   insulin lispro protamine & lispro (HUMALOG MIX 75/25 KWIKPEN) (75-25) 100 UNIT per ML SUPN injection pen Inject 45 units bid  Jean Paul De Jesus MD   Insulin Pen Needle (BD PEN NEEDLE MICRO U/F) 32G X 6 MM MISC USE TWICE DAILY  Jean Paul De Jesus MD   insulin aspart protamine-insulin aspart (NOVOLOG MIX 70/30 FLEXPEN) (70-30) 100 UNIT/ML injection 45 units twice a day  Jean Paul De Jesus MD   ONE TOUCH ULTRA TEST strip TEST THREE TIMES DAILY AS NEEDED  Jean Paul De Jesus MD   gabapentin (NEURONTIN) 300 MG capsule Take 1 capsule by mouth Daily with supper for 30 days. Chata Licea MD   ciclopirox [facility]) 8 % solution Apply to affected toenails nightly. Remove built up layers once per week.   Milo Lombard, DPM   ONE TOUCH ULTRA TEST strip TEST THREE TIMES DAILY AS NEEDED Herminio Calix MD   atorvastatin (LIPITOR) 40 MG tablet TAKE 1 TABLET BY MOUTH DAILY  Alecia Hart MD   amLODIPine (NORVASC) 2.5 MG tablet TAKE 1 TABLET BY MOUTH DAILY  Alecia Hart MD   ibuprofen (ADVIL;MOTRIN) 800 MG tablet TAKE 1 TABLET BY MOUTH EVERY 8 HOURS AS NEEDED FOR PAIN  Alecia Hart MD   albuterol-ipratropium (COMBIVENT RESPIMAT)  MCG/ACT AERS inhaler Inhale 1 puff into the lungs every 4 hours as needed for Wheezing or Shortness of Breath  Radha Rosales MD   Handicap Placard MISC by Does not apply route  Alecia Hart MD       Social History     Tobacco Use    Smoking status: Current Every Day Smoker     Packs/day: 1.00     Years: 43.00     Pack years: 43.00     Types: Cigarettes     Last attempt to quit: 3/16/2018     Years since quitting: 3.0    Smokeless tobacco: Never Used   Substance Use Topics    Alcohol use: No    Drug use: No            PHYSICAL EXAMINATION:  [ INSTRUCTIONS:  \"[x]\" Indicates a positive item  \"[]\" Indicates a negative item  -- DELETE ALL ITEMS NOT EXAMINED]  [] Alert  [] Oriented to person/place/time    [] No apparent distress  [] Toxic appearing    [] Face flushed appearing [] Sclera clear  [] Lips are cyanotic      [] Breathing appears normal  [] Appears tachypneic      [] Rash on visible skin    [] Cranial Nerves II-XII grossly intact    [] Motor grossly intact in visible upper extremities    [] Motor grossly intact in visible lower extremities    [] Normal Mood  [] Anxious appearing    [] Depressed appearing  [] Confused appearing      [] Poor short term memory  [] Poor long term memory    [] OTHER:      Due to this being a TeleHealth encounter, evaluation of the following organ systems is limited: Vitals/Constitutional/EENT/Resp/CV/GI//MS/Neuro/Skin/Heme-Lymph-Imm. ASSESSMENT/PLAN:     Diagnosis Orders   1.  Type 2 diabetes mellitus with other specified complication, with long-term current use of insulin (HCC)  Basic Metabolic Panel Hemoglobin A1C     Increase insulin dose to 50 units twice daily A1c goal of 7 follow-up repeat labs prior to her next visit  Orders Placed This Encounter   Medications    insulin lispro protamine & lispro (HUMALOG MIX 75/25 KWIKPEN) (75-25) 100 UNIT per ML SUPN injection pen     Sig: Inject 50 units bid     Dispense:  10 pen     Refill:  3    Insulin Pen Needle (BD PEN NEEDLE MICRO U/F) 32G X 6 MM MISC     Sig: USE TWICE DAILY     Dispense:  100 each     Refill:  5       An  electronic signature was used to authenticate this note. --Sandi Charles MD on 3/30/2021 at 11:43 AM        Pursuant to the emergency declaration under the [facility], [facility] waiver authority and the [employer] and [employer] Act, this Virtual  Visit was conducted, with patient's consent, to reduce the patient's risk of exposure to COVID-19 and provide continuity of care for an established patient. Services were provided through a video synchronous discussion virtually to substitute for in-person clinic visit.

## 2021-04-04 ASSESSMENT — ENCOUNTER SYMPTOMS: SHORTNESS OF BREATH: 1

## 2021-04-21 RX ORDER — INSULIN LISPRO 100 [IU]/ML
INJECTION, SUSPENSION SUBCUTANEOUS
Qty: 10 PEN | Refills: 3 | Status: SHIPPED | OUTPATIENT
Start: 2021-04-21 | End: 2021-11-23 | Stop reason: SDUPTHER

## 2021-04-21 NOTE — TELEPHONE ENCOUNTER
patient requesting medication refill.  Please approve or deny this request.    Rx requested:  Requested Prescriptions     Pending Prescriptions Disp Refills    insulin lispro protamine & lispro (HUMALOG MIX 75/25 KWIKPEN) (75-25) 100 UNIT per ML SUPN injection pen 10 pen 3     Sig: Inject 50 units bid         Last Office Visit:   3/30/2021      Next Visit Date:  Future Appointments   Date Time Provider Jerad Menon   6/30/2021 10:30 AM Zach De Guzman MD [facility]

## 2021-06-08 RX ORDER — PEN NEEDLE, DIABETIC 32 GX 1/4"
NEEDLE, DISPOSABLE MISCELLANEOUS
Qty: 100 EACH | Refills: 5 | Status: SHIPPED | OUTPATIENT
Start: 2021-06-08 | End: 2022-03-29

## 2021-06-08 NOTE — TELEPHONE ENCOUNTER
Patient requesting medication refill.  Please approve or deny this request.    Rx requested:  Requested Prescriptions     Pending Prescriptions Disp Refills    Insulin Pen Needle (BD PEN NEEDLE MICRO U/F) 32G X 6 MM MISC 100 each 5     Sig: USE TWICE DAILY         Last Office Visit:   3/30/2021      Next Visit Date:  Future Appointments   Date Time Provider Jerad Menon   6/30/2021 10:30 AM Venita Gosselin,  [address] [facility] [address]

## 2021-06-25 DIAGNOSIS — Z79.4 TYPE 2 DIABETES MELLITUS WITH OTHER SPECIFIED COMPLICATION, WITH LONG-TERM CURRENT USE OF INSULIN (HCC): ICD-10-CM

## 2021-06-25 DIAGNOSIS — E11.69 TYPE 2 DIABETES MELLITUS WITH OTHER SPECIFIED COMPLICATION, WITH LONG-TERM CURRENT USE OF INSULIN (HCC): ICD-10-CM

## 2021-06-25 LAB
ANION GAP SERPL CALCULATED.3IONS-SCNC: 15 MEQ/L (ref 9–15)
BUN BLDV-MCNC: 11 MG/DL (ref 8–23)
CALCIUM SERPL-MCNC: 9.6 MG/DL (ref 8.5–9.9)
CHLORIDE BLD-SCNC: 110 MEQ/L (ref 95–107)
CO2: 22 MEQ/L (ref 20–31)
CREAT SERPL-MCNC: 0.58 MG/DL (ref 0.5–0.9)
GFR AFRICAN AMERICAN: >60
GFR NON-AFRICAN AMERICAN: >60
GLUCOSE BLD-MCNC: 131 MG/DL (ref 70–99)
HBA1C MFR BLD: 6.8 % (ref 4.8–5.9)
POTASSIUM SERPL-SCNC: 4 MEQ/L (ref 3.4–4.9)
SODIUM BLD-SCNC: 147 MEQ/L (ref 135–144)

## 2021-06-30 ENCOUNTER — VIRTUAL VISIT (OUTPATIENT)
Dept: ENDOCRINOLOGY | Age: 61
End: 2021-06-30
Payer: MEDICARE

## 2021-06-30 ENCOUNTER — INITIAL CONSULT (OUTPATIENT)
Dept: INTERVENTIONAL RADIOLOGY/VASCULAR | Age: 61
End: 2021-06-30
Payer: MEDICARE

## 2021-06-30 VITALS
SYSTOLIC BLOOD PRESSURE: 134 MMHG | HEART RATE: 80 BPM | BODY MASS INDEX: 39.48 KG/M2 | WEIGHT: 230 LBS | DIASTOLIC BLOOD PRESSURE: 81 MMHG

## 2021-06-30 DIAGNOSIS — Z87.39 HISTORY OF BURNING PAIN IN LEG: ICD-10-CM

## 2021-06-30 DIAGNOSIS — Z79.4 TYPE 2 DIABETES MELLITUS WITH OTHER SPECIFIED COMPLICATION, WITH LONG-TERM CURRENT USE OF INSULIN (HCC): Primary | ICD-10-CM

## 2021-06-30 DIAGNOSIS — E11.69 TYPE 2 DIABETES MELLITUS WITH OTHER SPECIFIED COMPLICATION, WITH LONG-TERM CURRENT USE OF INSULIN (HCC): Primary | ICD-10-CM

## 2021-06-30 DIAGNOSIS — I70.212 ATHEROSCLEROSIS OF NATIVE ARTERIES OF EXTREMITIES WITH INTERMITTENT CLAUDICATION, LEFT LEG (HCC): ICD-10-CM

## 2021-06-30 DIAGNOSIS — I73.9 PERIPHERAL VASCULAR DISEASE, UNSPECIFIED (HCC): ICD-10-CM

## 2021-06-30 DIAGNOSIS — M79.606 LEG PAIN, ANTERIOR, UNSPECIFIED LATERALITY: Primary | ICD-10-CM

## 2021-06-30 PROCEDURE — G8428 CUR MEDS NOT DOCUMENT: HCPCS | Performed by: INTERNAL MEDICINE

## 2021-06-30 PROCEDURE — 2022F DILAT RTA XM EVC RTNOPTHY: CPT | Performed by: INTERNAL MEDICINE

## 2021-06-30 PROCEDURE — 3044F HG A1C LEVEL LT 7.0%: CPT | Performed by: INTERNAL MEDICINE

## 2021-06-30 PROCEDURE — G8427 DOCREV CUR MEDS BY ELIG CLIN: HCPCS | Performed by: NURSE PRACTITIONER

## 2021-06-30 PROCEDURE — 99214 OFFICE O/P EST MOD 30 MIN: CPT | Performed by: NURSE PRACTITIONER

## 2021-06-30 PROCEDURE — 99213 OFFICE O/P EST LOW 20 MIN: CPT | Performed by: INTERNAL MEDICINE

## 2021-06-30 PROCEDURE — 3017F COLORECTAL CA SCREEN DOC REV: CPT | Performed by: INTERNAL MEDICINE

## 2021-06-30 PROCEDURE — G8417 CALC BMI ABV UP PARAM F/U: HCPCS | Performed by: NURSE PRACTITIONER

## 2021-06-30 ASSESSMENT — ENCOUNTER SYMPTOMS
GASTROINTESTINAL NEGATIVE: 1
DIARRHEA: 0
EYE PAIN: 0
ABDOMINAL DISTENTION: 0
ABDOMINAL PAIN: 0
EYE DISCHARGE: 0
SHORTNESS OF BREATH: 0
EYE ITCHING: 0
VOMITING: 0
SORE THROAT: 0
EYE REDNESS: 0
WHEEZING: 0
COUGH: 0
TROUBLE SWALLOWING: 0
BACK PAIN: 0
EYES NEGATIVE: 1
EYES NEGATIVE: 1
CONSTIPATION: 0
NAUSEA: 0
RESPIRATORY NEGATIVE: 1

## 2021-06-30 NOTE — PROGRESS NOTES
Ty Rebolledo, a female of 56 y.o. came to the office 2021. Chief Complaint   Patient presents with    Leg Pain    Leg Swelling       2021 HPI: Ty Rebolledo self referred for evaluation of leg pain. Patient presents with symptoms of: left leg burning, prickly pain in lateral thigh. Difficulty sitting. Pain is daily and constant. Causes her to have to take several rest periods for relief. Going on for 2-3 years with progression. Nothing exacerbates pain. Occurs sitting and/or standing/walking. Rest helps to relieve. Denies leg swelling. Denies pain any other area of left leg. No injury. Denies right leg symptoms. Denies any troublesome varicose veins. H/O lumbar surgery Dr. Vera Vaughn. Denies any lower back pain. NSAIDS: Tolerates pain, no meds. Leg elevation:  Daily without relief. Stocking use and dates: Has not done conservative therapy with class two compression stockings. Denies claudication. PAD risk factors: IDDM, Obesity, HTN, HLD, currently smokes 1/2-1 PPD. Smoked at least 20+ years.      Family History   Problem Relation Age of Onset    Diabetes Mother     High Blood Pressure Mother     High Cholesterol Mother     Thyroid Disease Mother     Diabetes Father        Past Surgical History:   Procedure Laterality Date    BACK SURGERY       SECTION      2    COLONOSCOPY  2015    DR Oral Echevarria - POLYPS needs 2018    FACIAL RECONSTRUCTION SURGERY Bilateral 10/16/2017    EXCISION AND LAYERED PLASTIC CLOSURE INCLUSION CYST RIGHT EARLOBE AND LEFT CHEEK performed by Sahil Hinojosa MD at [address] [facility] Right 2016    RIGHT L3-4 L4-5 L5-S1 LAMINECTOMY RIGHT L4-5 MICRODISKECTOMY performed by Harless Bamberger, MD at [address] [place] [address] SCRN NOT  [address] [place] [address] N/A 2018    COLONOSCOPY razack polyp  next        Past Medical History:   Diagnosis Date    Benign neoplasm of colon 2015    DR Oral Echevarria    COPD (chronic obstructive pulmonary Medication Sig Dispense Refill    Insulin Pen Needle (BD PEN NEEDLE MICRO U/F) 32G X 6 MM MISC USE TWICE DAILY 100 each 5    insulin lispro protamine & lispro (HUMALOG MIX 75/25 KWIKPEN) (75-25) 100 UNIT per ML SUPN injection pen Inject 50 units bid 10 pen 3    insulin aspart protamine-insulin aspart (NOVOLOG MIX 70/30 FLEXPEN) (70-30) 100 UNIT/ML injection 45 units twice a day 10 pen 5    ONE TOUCH ULTRA TEST strip TEST THREE TIMES DAILY AS NEEDED 300 strip 1    gabapentin (NEURONTIN) 300 MG capsule Take 1 capsule by mouth Daily with supper for 30 days. 90 capsule 1    ciclopirox (PENLAC) 8 % solution Apply to affected toenails nightly. Remove built up layers once per week. 1 Bottle 5    ONE TOUCH ULTRA TEST strip TEST THREE TIMES DAILY AS NEEDED 200 strip 3    atorvastatin (LIPITOR) 40 MG tablet TAKE 1 TABLET BY MOUTH DAILY 90 tablet 3    amLODIPine (NORVASC) 2.5 MG tablet TAKE 1 TABLET BY MOUTH DAILY 90 tablet 3    ibuprofen (ADVIL;MOTRIN) 800 MG tablet TAKE 1 TABLET BY MOUTH EVERY 8 HOURS AS NEEDED FOR PAIN 200 tablet 3    albuterol-ipratropium (COMBIVENT RESPIMAT)  MCG/ACT AERS inhaler Inhale 1 puff into the lungs every 4 hours as needed for Wheezing or Shortness of Breath 1 Inhaler 3    Handicap Placard MISC by Does not apply route 1 each 0     No current facility-administered medications on file prior to visit. Review of Systems   Constitutional: Negative. Negative for activity change, appetite change, chills, fatigue and fever. HENT: Negative. Negative for congestion, sore throat and trouble swallowing. Eyes: Negative. Negative for pain, discharge, redness and itching. Respiratory: Negative. Negative for cough, shortness of breath and wheezing. Cardiovascular: Negative. Negative for chest pain, palpitations and leg swelling. Gastrointestinal: Negative. Negative for abdominal distention, abdominal pain, constipation, diarrhea, nausea and vomiting.    Endocrine: Negative. Genitourinary: Negative. Negative for difficulty urinating, dysuria and frequency. Musculoskeletal: Negative for back pain, neck pain and neck stiffness. Chronic left lateral thigh burning, prickly pain, daily, constant. Skin: Negative. Neurological: Negative. Negative for dizziness, light-headedness and headaches. Hematological: Negative. Psychiatric/Behavioral: Negative. OBJECTIVE:  /81   Pulse 80   Wt 230 lb (104.3 kg)   LMP  (LMP Unknown)   BMI 39.48 kg/m²     Physical Exam  Constitutional:       General: She is not in acute distress. Appearance: Normal appearance. She is obese. She is not ill-appearing. HENT:      Head: Normocephalic and atraumatic. Nose: Nose normal. No congestion. Cardiovascular:      Rate and Rhythm: Regular rhythm. Pulses:           Dorsalis pedis pulses are 2+ on the right side and 2+ on the left side. Posterior tibial pulses are 2+ on the right side and 1+ on the left side. Pulmonary:      Effort: Pulmonary effort is normal.   Abdominal:      Palpations: Abdomen is soft. Musculoskeletal:         General: No swelling, tenderness, deformity or signs of injury. Normal range of motion. Cervical back: Normal range of motion. Right lower leg: No edema. Left lower leg: No edema. Skin:     Capillary Refill: Capillary refill takes 2 to 3 seconds. Findings: No bruising, erythema, lesion or rash. Neurological:      General: No focal deficit present. Mental Status: She is alert. Psychiatric:         Behavior: Behavior normal.       ASSESSMENT AND PLAN:  Chart, medications, lab work reviewed. Assessment:   1. Chronic left lateral burning/prickly type pain, daily, constant. Possible component of chronic venous insufficiency and/or arterial disease  2. HTN  3. HLD  4. Smoker  5. Obesity  6. IDDM    Plan:   1.  Return for bilateral LE Venous US evaluate for venous insufficiency with office return for results. Educated in detail disease process of venous insufficiency, purpose of ultrasound, and purpose of compression stockings. Will evaluate need for class II compression to legs once US scan and results done. 2. Given risk factors for arterial disease, will obtain arterial US scan of LLE with OV for results. If all vascular studies negative, will recommend evaluation by Neurospine. 3. Elevate LE's when sitting and/or lying for management of LE edema. 4. Patient is advised that if symptoms worsen in any way they will proceed to the nearest emergency room.          Lorena Cid, APRN - CNP

## 2021-06-30 NOTE — PROGRESS NOTES
2021    TELEHEALTH EVALUATION -- Audio/Visual (During KNPWJ-88 public health emergency)    Due to Diane 19 outbreak, patient's office visit was converted to a virtual visit. Patient was contacted and agreed to proceed with a virtual visit via [email or web address]  The risks and benefits of converting to a virtual visit were discussed in light of the current infectious disease epidemic. Patient also understood that insurance coverage and co-pays are up to their individual insurance plans. HPI: Follow-up on type 2 diabetes lab review patient on  Humalog 50 units twice a day blood sugars mostly in the low to mid 100 no hypoglycemia testing twice daily  Complication diabetes include neuropathy  A1c has improved to 6.8 from 7.9    Denies any leg pains has not had any exam    Bright Dumont (:  1960) has requested an audio/video evaluation for the following concern(s):    Patient Active Problem List   Diagnosis    Essential hypertension    Obesity    Tubular adenoma of colon    Type 2 diabetes mellitus without complication, without long-term current use of insulin ([address]    Mixed hyperlipidemia    DDD (degenerative disc disease), lumbosacral    Lumbar canal stenosis    Herniated lumbar intervertebral disc    Facet hypertrophy of lumbar region    Osteoarthritis of spine with radiculopathy, lumbar region    Lumbosacral spondylosis without myelopathy    Acute respiratory failure (HCC)    Respiratory failure (HCC)    Chronic obstructive pulmonary disease ([address]    Diabetic polyneuropathy associated with type 2 diabetes mellitus (HCC)    Callus    Dermatophytosis of nail    Hallux abducto valgus, bilateral    Tailor's bunionette, bilateral         Review of Systems   Eyes: Negative. Cardiovascular: Negative. Endocrine: Negative. All other systems reviewed and are negative. Prior to Visit Medications    Medication Sig Taking?  Authorizing Provider   Insulin Pen Needle (BD PEN NEEDLE MICRO U/F) 32G X 6 MM MISC USE TWICE DAILY  Jean Paul De Jesus MD   insulin lispro protamine & lispro (HUMALOG MIX 75/25 KWIKPEN) (75-25) 100 UNIT per ML SUPN injection pen Inject 50 units bid  Jean Paul De Jesus MD   insulin aspart protamine-insulin aspart (NOVOLOG MIX 70/30 FLEXPEN) (70-30) 100 UNIT/ML injection 45 units twice a day  Jean Paul De Jesus MD   ONE TOUCH ULTRA TEST strip TEST THREE TIMES DAILY AS NEEDED  Jean Paul De Jesus MD   gabapentin (NEURONTIN) 300 MG capsule Take 1 capsule by mouth Daily with supper for 30 days. Ellie Ramos MD   ciclopirox [facility]) 8 % solution Apply to affected toenails nightly. Remove built up layers once per week. Kalyn Miller DPM   ONE TOUCH ULTRA TEST strip TEST THREE TIMES DAILY AS NEEDED  Jean Paul Romano MD   atorvastatin (LIPITOR) 40 MG tablet TAKE 1 TABLET BY MOUTH DAILY  Darral Lennox, MD   amLODIPine (NORVASC) 2.5 MG tablet TAKE 1 TABLET BY MOUTH DAILY  Darral Lennox, MD   ibuprofen (ADVIL;MOTRIN) 800 MG tablet TAKE 1 TABLET BY MOUTH EVERY 8 HOURS AS NEEDED FOR PAIN  Darral Lennox, MD   albuterol-ipratropium (COMBIVENT RESPIMAT)  MCG/ACT AERS inhaler Inhale 1 puff into the lungs every 4 hours as needed for Wheezing or Shortness of Breath  Nuria Almendarez MD   Handicap Placard MISC by Does not apply route  Darral Lennox, MD       Social History     Tobacco Use    Smoking status: Current Every Day Smoker     Packs/day: 1.00     Years: 43.00     Pack years: 43.00     Types: Cigarettes     Last attempt to quit: 3/16/2018     Years since quitting: 3.2    Smokeless tobacco: Never Used   Vaping Use    Vaping Use: Never used   Substance Use Topics    Alcohol use: No    Drug use:  No            PHYSICAL EXAMINATION:  [ INSTRUCTIONS:  \"[x]\" Indicates a positive item  \"[]\" Indicates a negative item  -- DELETE ALL ITEMS NOT EXAMINED]  [] Alert  [] Oriented to person/place/time    [] No apparent distress  [] Toxic appearing    [] Face flushed appearing [] Sclera clear  [] Lips are cyanotic      [] Breathing appears normal  [] Appears tachypneic      [] Rash on visible skin    [] Cranial Nerves II-XII grossly intact    [] Motor grossly intact in visible upper extremities    [] Motor grossly intact in visible lower extremities    [] Normal Mood  [] Anxious appearing    [] Depressed appearing  [] Confused appearing      [] Poor short term memory  [] Poor long term memory    [] OTHER:      Due to this being a TeleHealth encounter, evaluation of the following organ systems is limited: Vitals/Constitutional/EENT/Resp/CV/GI//MS/Neuro/Skin/Heme-Lymph-Imm. ASSESSMENT/PLAN:     Diagnosis Orders   1. Type 2 diabetes mellitus with other specified complication, with long-term current use of insulin ([facility]         Orders Placed This Encounter   Procedures    Basic Metabolic Panel     Standing Status:   Future     Standing Expiration Date:   6/30/2022    Hemoglobin A1C     Standing Status:   Future     Standing Expiration Date:   6/30/2022       Continue current dose of 75/25 Humalog 50 units twice daily patient to have repeat labs for BMP A1c follow-up in 3 to 6 months time    An  electronic signature was used to authenticate this note. --Herminio Calix MD on 6/30/2021 at 10:51 AM        Pursuant to the emergency declaration under the [facility], [facility] waiver authority and the [employer] Act, this Virtual  Visit was conducted, with patient's consent, to reduce the patient's risk of exposure to COVID-19 and provide continuity of care for an established patient. Services were provided through a video synchronous discussion virtually to substitute for in-person clinic visit.

## 2021-07-13 ENCOUNTER — OFFICE VISIT (OUTPATIENT)
Dept: INTERVENTIONAL RADIOLOGY/VASCULAR | Age: 61
End: 2021-07-13
Payer: MEDICARE

## 2021-07-13 VITALS
BODY MASS INDEX: 39.48 KG/M2 | DIASTOLIC BLOOD PRESSURE: 81 MMHG | HEART RATE: 80 BPM | WEIGHT: 230 LBS | SYSTOLIC BLOOD PRESSURE: 134 MMHG

## 2021-07-13 DIAGNOSIS — I87.2 VENOUS INSUFFICIENCY OF BOTH LOWER EXTREMITIES: Primary | ICD-10-CM

## 2021-07-13 DIAGNOSIS — M79.606 LEG PAIN, ANTERIOR, UNSPECIFIED LATERALITY: ICD-10-CM

## 2021-07-13 DIAGNOSIS — Z87.39 HISTORY OF BURNING PAIN IN LEG: ICD-10-CM

## 2021-07-13 PROCEDURE — G8428 CUR MEDS NOT DOCUMENT: HCPCS | Performed by: NURSE PRACTITIONER

## 2021-07-13 PROCEDURE — 3017F COLORECTAL CA SCREEN DOC REV: CPT | Performed by: NURSE PRACTITIONER

## 2021-07-13 PROCEDURE — G8417 CALC BMI ABV UP PARAM F/U: HCPCS | Performed by: NURSE PRACTITIONER

## 2021-07-13 PROCEDURE — 99214 OFFICE O/P EST MOD 30 MIN: CPT | Performed by: NURSE PRACTITIONER

## 2021-07-13 PROCEDURE — 4004F PT TOBACCO SCREEN RCVD TLK: CPT | Performed by: NURSE PRACTITIONER

## 2021-07-13 ASSESSMENT — ENCOUNTER SYMPTOMS
BACK PAIN: 0
COUGH: 0
EYES NEGATIVE: 1
CONSTIPATION: 0
DIARRHEA: 0
GASTROINTESTINAL NEGATIVE: 1
SHORTNESS OF BREATH: 0
VOMITING: 0
EYE REDNESS: 0
ABDOMINAL DISTENTION: 0
TROUBLE SWALLOWING: 0
RESPIRATORY NEGATIVE: 1
ABDOMINAL PAIN: 0
EYE DISCHARGE: 0
WHEEZING: 0
NAUSEA: 0
EYE PAIN: 0
EYE ITCHING: 0
SORE THROAT: 0

## 2021-07-13 NOTE — PROGRESS NOTES
Steve Baker, a female of 56 y.o. came to the office 7/13/2021. Chief Complaint   Patient presents with    Results     venous us results     PROGRESS NOTE:     SUBJECTIVE:     7/13/2021: Steve Baker returns for results of LE Venous US to evaluate for insufficiency and arterial US to evaluate for LE arterial disease. She reports Left thigh symptoms of burning, prickly, tingling type pain persist and remain unchanged. There is insufficiency to both legs and insufficiency to left leg is in calf and trace amount. Arterial US shows triphasic waveform in upper leg and possible disease below knee however her symptoms exist in lateral thigh. Arterial US does not show any occlusive arterial LLE disease. She denies any back pain. Denies any right leg pain. Denies bilateral LE edema, aching, fatigue symptoms. Denies claudication. Denies any troublesome varicose veins. H/O lumbar surgery Dr. Sarina Marvin. Stocking use and dates: Has not done conservative therapy with class two compression stockings. 6/30/2021 HPI: Steve Baker self referred for evaluation of leg pain. Patient presents with symptoms of: left leg burning, prickly pain in lateral thigh. Difficulty sitting. Pain is daily and constant. Causes her to have to take several rest periods for relief. Going on for 2-3 years with progression. Nothing exacerbates pain. Occurs sitting and/or standing/walking. Rest helps to relieve. Denies leg swelling. Denies pain any other area of left leg. No injury. Denies right leg symptoms. Denies any troublesome varicose veins. H/O lumbar surgery Dr. Sarina Marvin. Denies any lower back pain. NSAIDS: Tolerates pain, no meds. Leg elevation:  Daily without relief. Stocking use and dates: Has not done conservative therapy with class two compression stockings. Denies claudication. PAD risk factors: IDDM, Obesity, HTN, HLD, currently smokes 1/2-1 PPD. Smoked at least 20+ years.      Family History   Problem Relation Age of Onset    Diabetes Mother     High Blood Pressure Mother     High Cholesterol Mother     Thyroid Disease Mother     Diabetes Father        Past Surgical History:   Procedure Laterality Date    BACK SURGERY       SECTION      2    COLONOSCOPY  2015    DR Joe Cerna - POLYPS needs 2018    FACIAL RECONSTRUCTION SURGERY Bilateral 10/16/2017    EXCISION AND LAYERED PLASTIC CLOSURE INCLUSION CYST RIGHT EARLOBE AND LEFT CHEEK performed by Dmitri Hutchison MD at [address] [facility] [address] Right 2016    RIGHT L3-4 L4-5 L5-S1 LAMINECTOMY RIGHT L4-5 MICRODISKECTOMY performed by Teodora Weir MD at [facility] SCRN NOT  [address] N/A 2018    COLONOSCOPY razack polyp  next        Past Medical History:   Diagnosis Date    Benign neoplasm of colon 2015    DR Joe Cerna    COPD (chronic obstructive pulmonary disease) ([facility] 2018    Hyperlipidemia     Hypertension     Type II or unspecified type diabetes mellitus without mention of complication, not stated as uncontrolled        Social History     Socioeconomic History    Marital status:      Spouse name: Not on file    Number of children: Not on file    Years of education: Not on file    Highest education level: Not on file   Occupational History    Not on file   Tobacco Use    Smoking status: Current Every Day Smoker     Packs/day: 1.00     Years: 43.00     Pack years: 43.00     Types: Cigarettes     Last attempt to quit: 3/16/2018     Years since quitting: 3.3    Smokeless tobacco: Never Used   Vaping Use    Vaping Use: Never used   Substance and Sexual Activity    Alcohol use: No    Drug use: No    Sexual activity: Not on file   Other Topics Concern    Not on file   Social History Narrative    Not on file     Social Determinants of Health     Financial Resource Strain:     Difficulty of Paying Living Expenses:    Food Insecurity:     Worried About Running Out of Food in the Last Year:  Ran Out of Food in the Last Year:    Transportation Needs:     Lack of Transportation (Medical):  Lack of Transportation (Non-Medical):    Physical Activity:     Days of Exercise per Week:     Minutes of Exercise per Session:    Stress:     Feeling of Stress :    Social Connections:     Frequency of Communication with Friends and Family:     Frequency of Social Gatherings with Friends and Family:     Attends [employer] Services:     Active Member of Clubs or Organizations:     Attends Club or Organization Meetings:     Marital Status:    Intimate Partner Violence:     Fear of Current or Ex-Partner:     Emotionally Abused:     Physically Abused:     Sexually Abused: Allergies   Allergen Reactions    Hydrocodone-Acetaminophen     Metformin And Related Diarrhea    Norco [Hydrocodone-Acetaminophen]        Current Outpatient Medications on File Prior to Visit   Medication Sig Dispense Refill    Insulin Pen Needle (BD PEN NEEDLE MICRO U/F) 32G X 6 MM MISC USE TWICE DAILY 100 each 5    insulin lispro protamine & lispro (HUMALOG MIX 75/25 KWIKPEN) (75-25) 100 UNIT per ML SUPN injection pen Inject 50 units bid 10 pen 3    insulin aspart protamine-insulin aspart (NOVOLOG MIX 70/30 FLEXPEN) (70-30) 100 UNIT/ML injection 45 units twice a day 10 pen 5    ONE TOUCH ULTRA TEST strip TEST THREE TIMES DAILY AS NEEDED 300 strip 1    gabapentin (NEURONTIN) 300 MG capsule Take 1 capsule by mouth Daily with supper for 30 days. 90 capsule 1    ciclopirox (PENLAC) 8 % solution Apply to affected toenails nightly. Remove built up layers once per week.  1 Bottle 5    ONE TOUCH ULTRA TEST strip TEST THREE TIMES DAILY AS NEEDED 200 strip 3    atorvastatin (LIPITOR) 40 MG tablet TAKE 1 TABLET BY MOUTH DAILY 90 tablet 3    amLODIPine (NORVASC) 2.5 MG tablet TAKE 1 TABLET BY MOUTH DAILY 90 tablet 3    ibuprofen (ADVIL;MOTRIN) 800 MG tablet TAKE 1 TABLET BY MOUTH EVERY 8 HOURS AS NEEDED FOR PAIN 200 tablet 3    albuterol-ipratropium (COMBIVENT RESPIMAT)  MCG/ACT AERS inhaler Inhale 1 puff into the lungs every 4 hours as needed for Wheezing or Shortness of Breath 1 Inhaler 3    Handicap Placard MISC by Does not apply route 1 each 0     No current facility-administered medications on file prior to visit. Review of Systems   Constitutional: Negative. Negative for activity change, appetite change, chills, fatigue and fever. HENT: Negative. Negative for congestion, sore throat and trouble swallowing. Eyes: Negative. Negative for pain, discharge, redness and itching. Respiratory: Negative. Negative for cough, shortness of breath and wheezing. Cardiovascular: Negative. Negative for chest pain, palpitations and leg swelling. Gastrointestinal: Negative. Negative for abdominal distention, abdominal pain, constipation, diarrhea, nausea and vomiting. Endocrine: Negative. Genitourinary: Negative. Negative for difficulty urinating, dysuria and frequency. Musculoskeletal: Negative for back pain, neck pain and neck stiffness. Chronic left lateral thigh burning, prickly pain, daily, constant. Skin: Negative. Neurological: Negative. Negative for dizziness, light-headedness and headaches. Hematological: Negative. Psychiatric/Behavioral: Negative. OBJECTIVE:  /81   Pulse 80   Wt 230 lb (104.3 kg)   LMP  (LMP Unknown)   BMI 39.48 kg/m²     Physical Exam  Constitutional:       General: She is not in acute distress. Appearance: Normal appearance. She is obese. She is not ill-appearing. HENT:      Head: Normocephalic and atraumatic. Nose: Nose normal. No congestion. Cardiovascular:      Rate and Rhythm: Regular rhythm. Pulses:           Dorsalis pedis pulses are 2+ on the right side and 2+ on the left side. Posterior tibial pulses are 2+ on the right side and 1+ on the left side.    Pulmonary:      Effort: Pulmonary effort is normal. Abdominal:      Palpations: Abdomen is soft. Musculoskeletal:         General: No swelling, tenderness, deformity or signs of injury. Normal range of motion. Cervical back: Normal range of motion. Right lower leg: No edema. Left lower leg: No edema. Skin:     Capillary Refill: Capillary refill takes 2 to 3 seconds. Findings: No bruising, erythema, lesion or rash. Neurological:      General: No focal deficit present. Mental Status: She is alert. Psychiatric:         Behavior: Behavior normal.       LE Venous and arterial US results not yet dictated and therefore cannot post results. ASSESSMENT AND PLAN:  Chart, medications, lab work reviewed. LE Venous and LLE arterial US results reviewed and discussed with patient. There is insufficiency to both legs. Insufficiency to right leg is in both GSV and SSV however she has not adverse symptoms to right leg. Insufficiency to left leg is in calf and trace amount and not enough to consider at this time treatment for. Her symptoms exist in left lateral thigh and appear more neuropathic in nature. Arterial US shows triphasic waveform in upper leg and possible disease below knee however her symptoms exist in lateral thigh. Arterial US does not show any occlusive or blood flow limiting arterial LLE disease. Assessment:   1. Chronic left lateral burning/prickly type pain, daily, constant. Thi appears more neuropathic and not venous or arterial due to US results. 2. Chronic venous insufficiency to bilateral GSV and right SSV, 5 seconds in GSV right calf and 1.5 seconds in right SSV. Right leg is asymptomatic. CVI to left GSV located in calf and trace amount less than 1 second. 3. HTN  4. HLD  5. Smoker  6. Obesity  7. IDDM      Plan:   1. Refer to neurology Dr. Jayce Mayfield to evaluate left lateral thigh symptoms. 2.  Suggest wearing knee high 20-30 mmhg compression socks daily during day for management of bilateral LE CVI.  Rx for knee high 20-30 mmhg compression socks to wear daily during day and off nightly. Wear as tolerated. 3.  No further follow up care required in vascular clinic.         Kristine Moore, APRN - CNP

## 2021-07-15 ENCOUNTER — OFFICE VISIT (OUTPATIENT)
Dept: UROLOGY | Age: 61
End: 2021-07-15
Payer: MEDICARE

## 2021-07-15 VITALS
BODY MASS INDEX: 35.68 KG/M2 | DIASTOLIC BLOOD PRESSURE: 76 MMHG | WEIGHT: 209 LBS | HEART RATE: 103 BPM | HEIGHT: 64 IN | SYSTOLIC BLOOD PRESSURE: 138 MMHG

## 2021-07-15 DIAGNOSIS — R31.29 MICROHEMATURIA: Primary | ICD-10-CM

## 2021-07-15 LAB
BILIRUBIN, POC: ABNORMAL
BLOOD URINE, POC: ABNORMAL
CLARITY, POC: CLEAR
COLOR, POC: YELLOW
GLUCOSE URINE, POC: ABNORMAL
KETONES, POC: ABNORMAL
LEUKOCYTE EST, POC: ABNORMAL
NITRITE, POC: ABNORMAL
PH, POC: 5
PROTEIN, POC: ABNORMAL
SPECIFIC GRAVITY, POC: >=1.03
UROBILINOGEN, POC: 0.2

## 2021-07-15 PROCEDURE — G8427 DOCREV CUR MEDS BY ELIG CLIN: HCPCS | Performed by: UROLOGY

## 2021-07-15 PROCEDURE — 4004F PT TOBACCO SCREEN RCVD TLK: CPT | Performed by: UROLOGY

## 2021-07-15 PROCEDURE — 3017F COLORECTAL CA SCREEN DOC REV: CPT | Performed by: UROLOGY

## 2021-07-15 PROCEDURE — 81003 URINALYSIS AUTO W/O SCOPE: CPT | Performed by: UROLOGY

## 2021-07-15 PROCEDURE — G8417 CALC BMI ABV UP PARAM F/U: HCPCS | Performed by: UROLOGY

## 2021-07-15 PROCEDURE — 99203 OFFICE O/P NEW LOW 30 MIN: CPT | Performed by: UROLOGY

## 2021-07-15 NOTE — PROGRESS NOTES
[facility] EVALUATION NOTE                                                 H&P                                                                                                                                                 Reason for Visit  Persistent microhematuria    History of Present Illness  78-year-old female who underwent hematuria work-up 3 years ago which showed a right upper pole calculus  History of nephrolithiasis previous history of shockwave lithotripsy currently denies renal colic type pain  Denies gross hematuria  Continues to smoke  Will need another hematuria work-up      Urologic Review of Systems/Symptoms  No changes in voiding pattern    Review of Systems  Hospitalization: None recent  All 14 categories of Review of Systems otherwise reviewed no other findings reported.   Review of systems unchanged  Past Medical History:   Diagnosis Date    Benign neoplasm of colon 2015    DR Mariusz Campos    [facility]    Hyperlipidemia     Hypertension     Type II or unspecified type diabetes mellitus without mention of complication, not stated as uncontrolled      Past Surgical History:   Procedure Laterality Date    BACK SURGERY       SECTION      2    COLONOSCOPY  2015    DR Mariusz Campos - POLYPS needs 2018    FACIAL RECONSTRUCTION SURGERY Bilateral 10/16/2017    EXCISION AND LAYERED PLASTIC CLOSURE INCLUSION CYST RIGHT EARLOBE AND LEFT CHEEK performed by Kimberly Munroe MD at [facility] Right 2016    RIGHT L3-4 L4-5 L5-S1 LAMINECTOMY RIGHT L4-5 MICRODISKECTOMY performed by Maryjane Whiting MD at [address] [place] [address] SCRN NOT  [address] [place] [address] N/A 2018    COLONOSCOPY razack polyp 2023 next     Social History     Socioeconomic History    Marital status:      Spouse name: None    Number of children: None    Years of education: None    Highest education level: None   Occupational History    None Tobacco Use    Smoking status: Current Every Day Smoker     Packs/day: 1.00     Years: 43.00     Pack years: 43.00     Types: Cigarettes     Last attempt to quit: 3/16/2018     Years since quitting: 3.3    Smokeless tobacco: Never Used   Vaping Use    Vaping Use: Never used   Substance and Sexual Activity    Alcohol use: No    Drug use: No    Sexual activity: None   Other Topics Concern    None   Social History Narrative    None     Social Determinants of Health     Financial Resource Strain:     Difficulty of Paying Living Expenses:    Food Insecurity:     Worried About Running Out of Food in the Last Year:     Ran Out of Food in the Last Year:    Transportation Needs:     Lack of Transportation (Medical):  Lack of Transportation (Non-Medical):    Physical Activity:     Days of Exercise per Week:     Minutes of Exercise per Session:    Stress:     Feeling of Stress :    Social Connections:     Frequency of Communication with Friends and Family:     Frequency of Social Gatherings with Friends and Family:     Attends Restorationist Services:     Active Member of Clubs or Organizations:     Attends Club or Organization Meetings:     Marital Status:    Intimate Partner Violence:     Fear of Current or Ex-Partner:     Emotionally Abused:     Physically Abused:     Sexually Abused:      Family History   Problem Relation Age of Onset    Diabetes Mother     High Blood Pressure Mother     High Cholesterol Mother     Thyroid Disease Mother     Diabetes Father      Current Outpatient Medications   Medication Sig Dispense Refill    Elastic Bandages & Supports (MEDICAL COMPRESSION STOCKINGS) MISC 1 each by Does not apply route daily Knee high 20-30 mmhg compression stockings both legs wear daily during day and off Qhs. Wear as tolerated. Do not wear if they cause increased pain.  1 each 5    Insulin Pen Needle (BD PEN NEEDLE MICRO U/F) 32G X 6 MM MISC USE TWICE DAILY 100 each 5    insulin lispro urogram  Office cystoscopy  Greater than 50% of 30 minutes spent consulting patient face-to-face  Orders Placed This Encounter   Procedures    CT ABDOMEN PELVIS W WO CONTRAST Additional Contrast? None     Standing Status:   Future     Standing Expiration Date:   7/15/2022     Order Specific Question:   Additional Contrast?     Answer:   None     Order Specific Question:   Reason for exam:     Answer:   compare to 2018/right renal calculus    XR ABDOMEN (KUB) (SINGLE AP VIEW)     Standing Status:   Future     Standing Expiration Date:   7/15/2022    XR ABDOMEN (KUB) (SINGLE AP VIEW)     Standing Status:   Future     Standing Expiration Date:   7/15/2022    POCT Urinalysis No Micro (Auto)     No orders of the defined types were placed in this encounter. Orson Collet, MD       Please note this report has been partially produced using speech recognition software  And may cause contain errors related to that system including grammar, punctuation and spelling as well as words and phrases that may seem inappropriate. If there are questions or concerns please feel free to contact me to clarify.

## 2021-07-16 PROBLEM — Z53.20 MAMMOGRAM DECLINED: Status: ACTIVE | Noted: 2021-07-16

## 2021-07-16 PROBLEM — Z72.0 TOBACCO USER: Status: ACTIVE | Noted: 2021-07-16

## 2021-07-16 PROBLEM — S82.143A FRACTURE OF TIBIAL PLATEAU: Status: ACTIVE | Noted: 2021-07-16

## 2021-07-16 PROBLEM — S83.90XA SPRAIN OF KNEE: Status: ACTIVE | Noted: 2021-07-16

## 2021-07-16 PROBLEM — M23.90 DERANGEMENT OF KNEE: Status: ACTIVE | Noted: 2021-07-16

## 2021-07-16 PROBLEM — R05.3 PERSISTENT COUGH: Status: ACTIVE | Noted: 2021-07-16

## 2021-07-16 PROBLEM — M19.90 GENERALIZED ARTHRITIS: Status: ACTIVE | Noted: 2021-07-16

## 2021-07-16 PROBLEM — M77.32 CALCANEAL SPUR OF BOTH FEET: Status: ACTIVE | Noted: 2021-07-16

## 2021-07-16 PROBLEM — H66.90 OTITIS MEDIA: Status: ACTIVE | Noted: 2021-07-16

## 2021-07-16 PROBLEM — R31.9 HEMATURIA: Status: ACTIVE | Noted: 2021-07-16

## 2021-07-16 PROBLEM — J32.0 MAXILLARY SINUSITIS: Status: ACTIVE | Noted: 2021-07-16

## 2021-07-16 PROBLEM — M72.2 PLANTAR FASCIITIS: Status: ACTIVE | Noted: 2021-07-16

## 2021-07-16 PROBLEM — M77.31 CALCANEAL SPUR OF BOTH FEET: Status: ACTIVE | Noted: 2021-07-16

## 2021-07-16 PROBLEM — J02.9 ACUTE PHARYNGITIS: Status: ACTIVE | Noted: 2018-03-16

## 2021-07-16 PROBLEM — L57.0 ACTINIC KERATOSIS: Status: ACTIVE | Noted: 2019-04-15

## 2021-07-19 ENCOUNTER — OFFICE VISIT (OUTPATIENT)
Dept: NEUROLOGY | Age: 61
End: 2021-07-19
Payer: MEDICARE

## 2021-07-19 VITALS
WEIGHT: 233 LBS | DIASTOLIC BLOOD PRESSURE: 82 MMHG | SYSTOLIC BLOOD PRESSURE: 132 MMHG | HEART RATE: 85 BPM | BODY MASS INDEX: 39.99 KG/M2

## 2021-07-19 DIAGNOSIS — E11.65 TYPE 2 DIABETES MELLITUS WITH HYPERGLYCEMIA, WITHOUT LONG-TERM CURRENT USE OF INSULIN (HCC): ICD-10-CM

## 2021-07-19 DIAGNOSIS — Z76.89 ENCOUNTER TO ESTABLISH CARE: ICD-10-CM

## 2021-07-19 DIAGNOSIS — R20.2 LEFT LEG PARESTHESIAS: Primary | ICD-10-CM

## 2021-07-19 DIAGNOSIS — R20.2 LEFT LEG PARESTHESIAS: ICD-10-CM

## 2021-07-19 DIAGNOSIS — M48.061 SPINAL STENOSIS OF LUMBAR REGION, UNSPECIFIED WHETHER NEUROGENIC CLAUDICATION PRESENT: ICD-10-CM

## 2021-07-19 DIAGNOSIS — G57.12 MERALGIA PARESTHETICA OF LEFT SIDE: ICD-10-CM

## 2021-07-19 LAB
HBA1C MFR BLD: 6.5 % (ref 4.8–5.9)
HCT VFR BLD CALC: 40.5 % (ref 37–47)
HEMOGLOBIN: 13.5 G/DL (ref 12–16)
MCH RBC QN AUTO: 29.1 PG (ref 27–31.3)
MCHC RBC AUTO-ENTMCNC: 33.4 % (ref 33–37)
MCV RBC AUTO: 87.3 FL (ref 82–100)
PDW BLD-RTO: 13.8 % (ref 11.5–14.5)
PLATELET # BLD: 282 K/UL (ref 130–400)
RBC # BLD: 4.64 M/UL (ref 4.2–5.4)
TSH REFLEX: 1.27 UIU/ML (ref 0.44–3.86)
WBC # BLD: 6.2 K/UL (ref 4.8–10.8)

## 2021-07-19 PROCEDURE — 3044F HG A1C LEVEL LT 7.0%: CPT | Performed by: NURSE PRACTITIONER

## 2021-07-19 PROCEDURE — G8417 CALC BMI ABV UP PARAM F/U: HCPCS | Performed by: NURSE PRACTITIONER

## 2021-07-19 PROCEDURE — 2022F DILAT RTA XM EVC RTNOPTHY: CPT | Performed by: NURSE PRACTITIONER

## 2021-07-19 PROCEDURE — G8427 DOCREV CUR MEDS BY ELIG CLIN: HCPCS | Performed by: NURSE PRACTITIONER

## 2021-07-19 PROCEDURE — 3017F COLORECTAL CA SCREEN DOC REV: CPT | Performed by: NURSE PRACTITIONER

## 2021-07-19 PROCEDURE — 4004F PT TOBACCO SCREEN RCVD TLK: CPT | Performed by: NURSE PRACTITIONER

## 2021-07-19 PROCEDURE — 99204 OFFICE O/P NEW MOD 45 MIN: CPT | Performed by: NURSE PRACTITIONER

## 2021-07-19 ASSESSMENT — ENCOUNTER SYMPTOMS
ABDOMINAL PAIN: 0
SHORTNESS OF BREATH: 0
TROUBLE SWALLOWING: 0
COLOR CHANGE: 0
NAUSEA: 0
CONSTIPATION: 0
ABDOMINAL DISTENTION: 0
VOMITING: 0
BACK PAIN: 0
COUGH: 0
DIARRHEA: 0
WHEEZING: 0
CHEST TIGHTNESS: 0

## 2021-07-19 NOTE — PROGRESS NOTES
Subjective:      Patient ID: Florentin Car is a 56 y.o. female who presents today for:  Chief Complaint   Patient presents with    New Patient     Pt states that she has a burning feeling like it feels on fire starting at her thigh and goes down her leg. She says it isnt really pain as much as just discomfort. She says she has a hard sharp tingling feeling as well. Onset of this has been about a year maybe a little longer. She says it worsens when she is on her feet.  Other     She went to Cardiology and they did an [address] on her legs. HPI  Pt seen and examined in the office to establish care for left thigh paresthesias. Patient is a 69-year-old  female with past medical history of diabetes, hypertension, hyperlipidemia, COPD, tobacco abuse who presents today for come Sarns over left thigh burning and prickling pain. She reports that this issue began approximately 1-1/2 years ago and has been gradually getting worse. She denies any fall or trauma to her back, hip or leg. Patient does have history of lumbar surgery approximately 5 years ago with Dr. Gustavo Lozano. She had L3-S1 decompression. Patient reports that the pain is present on a daily basis and is exacerbated after walking. She has some mild swelling of the lower extremities. No discoloration. There is no weakness of the leg, foot drop, buckling of the knee. Denies back pain. No loss of bowel or bladder, fevers, unusual or unplanned weight loss. She did have recent lower extremity venous and arterial duplex done that was negative for DVT. She had mild venous insufficiency noted. She also had PAD noted in the left lower extremity below the level of the knee. Patient is diabetic with elevated blood sugars. Last hemoglobin A1c was 6.8 on 6/25/2021. She is obese with BMI of 39.99. No atrophy of the leg noted. Sensory mapping was intact.   Past Medical History:   Diagnosis Date    Benign neoplasm of colon 08/03/2015    DR Med Sawyer COPD (chronic obstructive pulmonary disease) [facility] 2018    Hyperlipidemia     Hypertension     Type II or unspecified type diabetes mellitus without mention of complication, not stated as uncontrolled      Past Surgical History:   Procedure Laterality Date    BACK SURGERY       SECTION      2    COLONOSCOPY  2015    DR Raymond Sickle - POLYPS needs 2018    FACIAL RECONSTRUCTION SURGERY Bilateral 10/16/2017    EXCISION AND LAYERED PLASTIC CLOSURE INCLUSION CYST RIGHT EARLOBE AND LEFT CHEEK performed by Rahul Deng MD at [address] Right 2016    RIGHT L3-4 L4-5 L5-S1 LAMINECTOMY RIGHT L4-5 MICRODISKECTOMY performed by Hudson Null MD at [facility] SCRN NOT  [address] IND N/A 2018    COLONOSCOPY razack polyp 2023 next     Social History     Socioeconomic History    Marital status:      Spouse name: Not on file    Number of children: Not on file    Years of education: Not on file    Highest education level: Not on file   Occupational History    Not on file   Tobacco Use    Smoking status: Current Every Day Smoker     Packs/day: 1.00     Years: 43.00     Pack years: 43.00     Types: Cigarettes     Last attempt to quit: 3/16/2018     Years since quitting: 3.3    Smokeless tobacco: Never Used   Vaping Use    Vaping Use: Never used   Substance and Sexual Activity    Alcohol use: No    Drug use: No    Sexual activity: Not on file   Other Topics Concern    Not on file   Social History Narrative    Not on file     Social Determinants of Health     Financial Resource Strain:     Difficulty of Paying Living Expenses:    Food Insecurity:     Worried About Running Out of Food in the Last Year:     Ran Out of Food in the Last Year:    Transportation Needs:     Lack of Transportation (Medical):      Lack of Transportation (Non-Medical):    Physical Activity:     Days of Exercise per Week:     Minutes of Exercise per Session:    Stress:     Feeling of Stress :    Social Connections:     Frequency of Communication with Friends and Family:     Frequency of Social Gatherings with Friends and Family:     Attends Tenriism Services:     Active Member of Clubs or Organizations:     Attends Club or Organization Meetings:     Marital Status:    Intimate Partner Violence:     Fear of Current or Ex-Partner:     Emotionally Abused:     Physically Abused:     Sexually Abused:      Family History   Problem Relation Age of Onset    Diabetes Mother     High Blood Pressure Mother     High Cholesterol Mother     Thyroid Disease Mother     Diabetes Father      Allergies   Allergen Reactions    Hydrocodone-Acetaminophen     Metformin And Related Diarrhea    Norco [Hydrocodone-Acetaminophen]      Current Outpatient Medications on File Prior to Visit   Medication Sig Dispense Refill    Elastic Bandages & Supports (MEDICAL COMPRESSION STOCKINGS) [facility] 1 each by Does not apply route daily Knee high 20-30 mmhg compression stockings both legs wear daily during day and off Qhs. Wear as tolerated. Do not wear if they cause increased pain.  1 each 5    Insulin Pen Needle (BD PEN NEEDLE MICRO U/F) 32G X 6 MM MISC USE TWICE DAILY 100 each 5    insulin lispro protamine & lispro (HUMALOG MIX 75/25 KWIKPEN) (75-25) 100 UNIT per ML SUPN injection pen Inject 50 units bid 10 pen 3    ONE TOUCH ULTRA TEST strip TEST THREE TIMES DAILY AS NEEDED 300 strip 1    ONE TOUCH ULTRA TEST strip TEST THREE TIMES DAILY AS NEEDED 200 strip 3    atorvastatin (LIPITOR) 40 MG tablet TAKE 1 TABLET BY MOUTH DAILY 90 tablet 3    amLODIPine (NORVASC) 2.5 MG tablet TAKE 1 TABLET BY MOUTH DAILY 90 tablet 3    ibuprofen (ADVIL;MOTRIN) 800 MG tablet TAKE 1 TABLET BY MOUTH EVERY 8 HOURS AS NEEDED FOR PAIN 200 tablet 3    albuterol-ipratropium (COMBIVENT RESPIMAT)  MCG/ACT AERS inhaler Inhale 1 puff into the lungs every 4 hours as needed for Wheezing or Shortness of Breath 1 Inhaler 3    Handicap Placard [facility] by Does not apply route 1 each 0     No current facility-administered medications on file prior to visit. Review of Systems   Constitutional: Negative for activity change, appetite change, chills, fatigue and fever. HENT: Negative for hearing loss and trouble swallowing. Eyes: Negative for visual disturbance. Respiratory: Negative for cough, chest tightness, shortness of breath and wheezing. Cardiovascular: Negative for chest pain, palpitations and leg swelling. Gastrointestinal: Negative for abdominal distention, abdominal pain, constipation, diarrhea, nausea and vomiting. Genitourinary: Negative for difficulty urinating. Musculoskeletal: Negative for arthralgias, back pain, gait problem, neck pain and neck stiffness. Skin: Negative for color change and rash. Neurological: Positive for numbness. Negative for dizziness, tremors, seizures, syncope, facial asymmetry, speech difficulty, weakness, light-headedness and headaches. Psychiatric/Behavioral: Negative for agitation, confusion and hallucinations. The patient is not nervous/anxious. Objective:   /82 (Site: Left Upper Arm, Position: Sitting, Cuff Size: Large Adult)   Pulse 85   Wt 233 lb (105.7 kg)   LMP  (LMP Unknown)   BMI 39.99 kg/m²     Physical Exam  Vitals reviewed. Constitutional:       General: She is not in acute distress. Appearance: She is obese. She is not ill-appearing or diaphoretic. HENT:      Head: Normocephalic and atraumatic. Eyes:      Extraocular Movements: Extraocular movements intact. Pupils: Pupils are equal, round, and reactive to light. Cardiovascular:      Rate and Rhythm: Normal rate and regular rhythm. Pulmonary:      Effort: Pulmonary effort is normal. No respiratory distress. Breath sounds: Normal breath sounds. Abdominal:      General: Bowel sounds are normal.      Palpations: Abdomen is soft.    Skin:     General: Skin is warm and dry.   Neurological:      General: No focal deficit present. Mental Status: She is alert and oriented to person, place, and time. Cranial Nerves: No cranial nerve deficit. Sensory: No sensory deficit. Motor: No weakness, tremor, atrophy, abnormal muscle tone, seizure activity or pronator drift. Coordination: Romberg sign negative. Coordination normal. Finger-Nose-Finger Test normal.      Gait: Gait normal.      Deep Tendon Reflexes: Reflexes abnormal.      Reflex Scores:       Patellar reflexes are 1+ on the right side and 1+ on the left side. No results found. Lab Results   Component Value Date    WBC 7.4 07/13/2018    RBC 4.59 07/13/2018    HGB 13.6 07/13/2018    HCT 40.7 07/13/2018    MCV 88.6 07/13/2018    MCH 29.6 07/13/2018    MCHC 33.4 07/13/2018    RDW 13.8 07/13/2018     07/13/2018    MPV 8.6 06/24/2015     Lab Results   Component Value Date     06/25/2021    K 4.0 06/25/2021    K 4.5 03/17/2018     06/25/2021    CO2 22 06/25/2021    BUN 11 06/25/2021    CREATININE 0.58 06/25/2021    GFRAA >60.0 06/25/2021    LABGLOM >60.0 06/25/2021    GLUCOSE 131 06/25/2021    PROT 6.6 03/15/2019    LABALBU 4.2 03/15/2019    CALCIUM 9.6 06/25/2021    BILITOT 0.5 03/15/2019    ALKPHOS 124 03/15/2019    AST 16 03/15/2019    ALT 25 03/15/2019     Lab Results   Component Value Date    PROTIME 10.7 03/16/2018    INR 1.0 03/16/2018     Lab Results   Component Value Date    TSH 1.370 09/21/2015     Lab Results   Component Value Date    TRIG 123 06/29/2020    HDL 51 06/29/2020    LDLCALC 64 06/29/2020     No results found for: LABAMPH, BARBSCNU, LABBENZ, CANNAB, COCAINESCRN, LABMETH, OPIATESCREENURINE, PHENCYCLIDINESCREENURINE, PPXUR, ETOH  No results found for: LITHIUM, DILFRTOT, VALPROATE    Assessment and Plan:      1. Left leg paresthesias  - EMG; Future  - Hemoglobin A1C; Future  - Electrophoresis Protein, Serum without Reflex to Immunofixation;  Future  - TSH, Reflex to T4; Future  - CBC; Future    2. Meralgia paresthetica of left side  - EMG; Future    3. Spinal stenosis of lumbar region, unspecified whether neurogenic claudication present  - EMG; Future    4. Type 2 diabetes mellitus with hyperglycemia, without long-term current use of insulin (HCC)    5. Encounter to establish care  -Patient is a 75-year-old  female with past medical history of diabetes, hypertension, hyperlipidemia, COPD, tobacco abuse who presents today to establish care for left thigh paresthesias that have been ongoing for a year and a half and worsening over the last several months. Symptoms at this time are all sensory. There is no motor deficit. Patient does have history of lumbar spine decompression in the last 3 to 5 years. She is diabetic with hyperglycemia and also obesity. Given the location of her symptoms in the left lateral thigh without motor deficits consideration being given to meralgia paresthetica. Patient was advised to improve control of blood sugars and to lose weight. Will obtain EMG to get further input. Given her history of back surgery we will also consider MRI of lumbar spine if needed after EMG results reviewed. We did discuss treating symptomatically with gabapentin but patient declined at this time. Will obtain TSH, B12 and folate, CBC, SPEP. Further recommendations to follow pending EMG and laboratory results. Return in about 3 months (around 10/19/2021), or if symptoms worsen or fail to improve.     ADRIAN Nelson - CNP     Collaborating Physician Dr Trinity Brooks

## 2021-07-22 LAB
ALBUMIN SERPL-MCNC: 3.95 G/DL (ref 3.75–5.01)
ALPHA-1-GLOBULIN: 0.31 G/DL (ref 0.19–0.46)
ALPHA-2-GLOBULIN: 0.85 G/DL (ref 0.48–1.05)
BETA GLOBULIN: 0.71 G/DL (ref 0.48–1.1)
GAMMA GLOBULIN: 0.69 G/DL (ref 0.62–1.51)
PROTEIN ELECTROPHORESIS, SERUM: NORMAL
SPE/IFE INTERPRETATION: NORMAL
TOTAL PROTEIN: 6.5 G/DL (ref 6.3–8.2)

## 2021-07-28 ENCOUNTER — HOSPITAL ENCOUNTER (OUTPATIENT)
Dept: NEUROLOGY | Age: 61
Discharge: HOME | End: 2021-07-28
Payer: MEDICARE

## 2021-07-28 DIAGNOSIS — M48.061 SPINAL STENOSIS OF LUMBAR REGION, UNSPECIFIED WHETHER NEUROGENIC CLAUDICATION PRESENT: ICD-10-CM

## 2021-07-28 DIAGNOSIS — G57.12 MERALGIA PARESTHETICA OF LEFT SIDE: ICD-10-CM

## 2021-07-28 DIAGNOSIS — R20.2 LEFT LEG PARESTHESIAS: ICD-10-CM

## 2021-07-28 PROCEDURE — 95886 MUSC TEST DONE W/N TEST COMP: CPT | Performed by: PSYCHIATRY & NEUROLOGY

## 2021-07-28 PROCEDURE — 95912 NRV CNDJ TEST 11-12 STUDIES: CPT | Performed by: PSYCHIATRY & NEUROLOGY

## 2021-07-28 PROCEDURE — 95912 NRV CNDJ TEST 11-12 STUDIES: CPT

## 2021-07-28 PROCEDURE — 95886 MUSC TEST DONE W/N TEST COMP: CPT

## 2021-07-28 NOTE — PROCEDURES
sensory nerve conduction studies are evaluated to avoid an  artifact or temperature differences. This test is personally performed  and interpreted by me.         Susan Ambrocio MD    D: 07/28/2021 8:59:49       T: 07/28/2021 9:08:26     [email or web address]  Job#: [id number]     Doc#: [id number]    CC:

## 2021-07-30 ENCOUNTER — TELEPHONE (OUTPATIENT)
Dept: NEUROLOGY | Age: 61
End: 2021-07-30

## 2021-07-30 DIAGNOSIS — G57.12 MERALGIA PARESTHETICA OF LEFT SIDE: Primary | ICD-10-CM

## 2021-07-30 RX ORDER — CARBAMAZEPINE 100 MG/1
100 TABLET, EXTENDED RELEASE ORAL 2 TIMES DAILY
Qty: 60 TABLET | Refills: 3 | Status: SHIPPED | OUTPATIENT
Start: 2021-07-30 | End: 2021-08-03 | Stop reason: SDUPTHER

## 2021-07-30 NOTE — TELEPHONE ENCOUNTER
EMG verified left-sided meralgia paresthetica. Patient has tried gabapentin in the past with no improvement. Advised her that we can try Tegretol and she is agreeable. Baseline sodium levels normal.  Will initiate Tegretol 100 mg twice daily and titrate as tolerated according to symptom relief. Urine hemoglobin A1c 6.5. Advised patient she will need good glycemic control to help with her management of this condition. Weight loss would also be beneficial.  Advised her against tight garments and clothing.

## 2021-08-03 DIAGNOSIS — G57.12 MERALGIA PARESTHETICA OF LEFT SIDE: ICD-10-CM

## 2021-08-03 RX ORDER — CARBAMAZEPINE 100 MG/1
100 TABLET, EXTENDED RELEASE ORAL 2 TIMES DAILY
Qty: 180 TABLET | Refills: 1 | Status: SHIPPED | OUTPATIENT
Start: 2021-08-03

## 2021-08-03 NOTE — TELEPHONE ENCOUNTER
Pharmacy is requesting medication refill.  Please approve or deny this request.    Rx requested:  Requested Prescriptions     Pending Prescriptions Disp Refills    carBAMazepine (TEGRETOL-XR) 100 MG extended release tablet 180 tablet 1     Sig: Take 1 tablet by mouth 2 times daily         Last Office Visit:   7/19/2021      Next Visit Date:  Future Appointments   Date Time Provider Jerad Menon   10/18/2021  9:20 AM ADRIAN Nelson - NOE Ramos

## 2021-09-13 ENCOUNTER — TELEPHONE (OUTPATIENT)
Dept: NEUROLOGY | Age: 61
End: 2021-09-13

## 2021-09-13 NOTE — TELEPHONE ENCOUNTER
Pt states that she is still waking with leg pain every morning. She states that toward the evening it starts to happen as well with the tingling and burning. She is taking the carbamazepine 2 times daily. Is there anything else that can be done?

## 2021-11-08 DIAGNOSIS — E11.42 DIABETIC POLYNEUROPATHY ASSOCIATED WITH TYPE 2 DIABETES MELLITUS (HCC): ICD-10-CM

## 2021-11-08 DIAGNOSIS — E78.2 MIXED HYPERLIPIDEMIA: ICD-10-CM

## 2021-11-08 DIAGNOSIS — I73.9 PAD (PERIPHERAL ARTERY DISEASE) (HCC): Primary | ICD-10-CM

## 2021-11-08 DIAGNOSIS — I10 ESSENTIAL HYPERTENSION: ICD-10-CM

## 2021-11-09 ENCOUNTER — TELEPHONE (OUTPATIENT)
Dept: INTERVENTIONAL RADIOLOGY/VASCULAR | Age: 61
End: 2021-11-09

## 2021-11-09 NOTE — TELEPHONE ENCOUNTER
Patient was present yesterday during [address] for her sister. Charm Romberg recently seen in vascular clinic for chronic left thigh burning, numbness tingling discomfort she describes today. BLE Venous US insufficiency studies showed CVI to Left GSV of very minimal amount and only in calf. Her symptoms appeared more consistent with neuropathic etiology and therefore referral was made to neurology. She reports she has since seen neurology and testing came back negative and she continues to have Right lateral thigh symptoms. It was recommended she do 10 week compression sock trial for initial treatment of LLE CVI and she states she does not want to wear compression socks and does not feel that is her issue. LLE arterial US also done in vascular clinic reported possible arterial disease below knee. Due to abnormal arterial US and negative neurology workup per patient, will plan to do LE CTA with runoff to further evaluate extent of PAD. Follow OV for results. She is in agreement with plan to rule out any vascular etiology. Chart and Lab work reviewed.

## 2021-11-23 DIAGNOSIS — E11.69 TYPE 2 DIABETES MELLITUS WITH OTHER SPECIFIED COMPLICATION, WITH LONG-TERM CURRENT USE OF INSULIN (HCC): Primary | ICD-10-CM

## 2021-11-23 DIAGNOSIS — Z79.4 TYPE 2 DIABETES MELLITUS WITH OTHER SPECIFIED COMPLICATION, WITH LONG-TERM CURRENT USE OF INSULIN (HCC): Primary | ICD-10-CM

## 2021-11-23 RX ORDER — INSULIN LISPRO 100 [IU]/ML
INJECTION, SUSPENSION SUBCUTANEOUS
Qty: 15 PEN | Refills: 1 | Status: SHIPPED | OUTPATIENT
Start: 2021-11-23 | End: 2022-03-11 | Stop reason: SDUPTHER

## 2021-11-23 NOTE — TELEPHONE ENCOUNTER
Patient requesting medication refill. Please approve or deny this request.    Rx requested:  Requested Prescriptions     Pending Prescriptions Disp Refills    insulin lispro protamine & lispro (HUMALOG MIX 75/25 KWIKPEN) (75-25) 100 UNIT per ML SUPN injection pen 10 pen 3     Sig: Inject 50 units bid. E11.69         Last Office Visit:   6/30/2021      Next Visit Date:  No future appointments.

## 2022-01-17 ENCOUNTER — TELEPHONE (OUTPATIENT)
Dept: ENDOCRINOLOGY | Age: 62
End: 2022-01-17

## 2022-01-17 NOTE — TELEPHONE ENCOUNTER
Lower insulin dose to 10 units twice a day hold if glucose less than 120 patient also needs to follow-up has not been seen since last year

## 2022-01-17 NOTE — TELEPHONE ENCOUNTER
Patient called has not taken her insulin and blood glucose is at 202 please advise what you would like patient to do

## 2022-01-17 NOTE — TELEPHONE ENCOUNTER
Patient is experiencing hypo glycemia she checked her sugars in the am 120 then two hours after she ate it was 98 gave herself 45 units of insulin. Gave herself 45 units before bedtime woke up at 3am feeling jittery and sugars were 66 and she ate something and this morning its 102 would like to know what to do. Patient has not given herself insulin this morning.  She is going to eat breakfast then check her sugars and call us back with the reading please advise

## 2022-01-28 ENCOUNTER — VIRTUAL VISIT (OUTPATIENT)
Dept: ENDOCRINOLOGY | Age: 62
End: 2022-01-28
Payer: MEDICARE

## 2022-01-28 DIAGNOSIS — E11.69 TYPE 2 DIABETES MELLITUS WITH OTHER SPECIFIED COMPLICATION, WITH LONG-TERM CURRENT USE OF INSULIN (HCC): Primary | ICD-10-CM

## 2022-01-28 DIAGNOSIS — Z79.4 TYPE 2 DIABETES MELLITUS WITH OTHER SPECIFIED COMPLICATION, WITH LONG-TERM CURRENT USE OF INSULIN (HCC): Primary | ICD-10-CM

## 2022-01-28 PROCEDURE — 3017F COLORECTAL CA SCREEN DOC REV: CPT | Performed by: INTERNAL MEDICINE

## 2022-01-28 PROCEDURE — G8428 CUR MEDS NOT DOCUMENT: HCPCS | Performed by: INTERNAL MEDICINE

## 2022-01-28 PROCEDURE — 2022F DILAT RTA XM EVC RTNOPTHY: CPT | Performed by: INTERNAL MEDICINE

## 2022-01-28 PROCEDURE — 3046F HEMOGLOBIN A1C LEVEL >9.0%: CPT | Performed by: INTERNAL MEDICINE

## 2022-01-28 PROCEDURE — 99213 OFFICE O/P EST LOW 20 MIN: CPT | Performed by: INTERNAL MEDICINE

## 2022-01-28 NOTE — PROGRESS NOTES
2022    TELEHEALTH EVALUATION -- Audio/Visual (During MXECA-05 public health emergency)    Due to COVID 19 outbreak, patient's office visit was converted to a virtual visit. Patient was contacted and agreed to proceed with a virtual visit via Doxy. me  The risks and benefits of converting to a virtual visit were discussed in light of the current infectious disease epidemic. Patient also understood that insurance coverage and co-pays are up to their individual insurance plans.     HPI: Follow-up on type 2 diabetes video visit patient was at home I was at my office    Bhakti Bojorquez (:  1960) has requested an audio/video evaluation for the following concern(s):      Patient is on 75/25 Humalog 50 units twice daily overall blood sugars are staying in the low to mid 100 range denies any hypoglycemia  Complications diabetes include neuropathy reviewed meds updated    History of obesity hypertension    testing  2-3 times/day   Last hbaic was 6.5 2 weeks ago           Patient Active Problem List   Diagnosis    Essential hypertension    Severe obesity ([address]    Tubular adenoma of colon    Type 2 diabetes mellitus ([address]    Mixed hyperlipidemia    DDD (degenerative disc disease), lumbosacral    Lumbar canal stenosis    Herniated lumbar intervertebral disc    Facet hypertrophy of lumbar region    Osteoarthritis of spine with radiculopathy, lumbar region    Lumbosacral spondylosis without myelopathy    Acute pharyngitis    Respiratory failure ([address]    Chronic obstructive pulmonary disease ([address]    Diabetic polyneuropathy associated with type 2 diabetes mellitus (HCC)    Actinic keratosis    Dermatophytosis of nail    Hallux abducto valgus, bilateral    Tailor's bunionette, bilateral    Calcaneal spur of both feet    Derangement of knee    Fracture of tibial plateau    Generalized arthritis    Hematuria    Mammogram declined    Maxillary sinusitis    Otitis media    Persistent cough    Plantar fasciitis    Sprain of knee    Tobacco user    Left leg paresthesias       Review of Systems   Eyes: Negative. Respiratory: Negative. Cardiovascular: Negative. Endocrine: Negative. All other systems reviewed and are negative. Prior to Visit Medications    Medication Sig Taking? Authorizing Provider   insulin lispro protamine & lispro (HUMALOG MIX 75/25 KWIKPEN) (75-25) 100 UNIT per ML SUPN injection pen Inject 50 units bid. E11.69  Marvin Gomez MD   carBAMazepine (TEGRETOL-XR) 100 MG extended release tablet Take 1 tablet by mouth 2 times daily  ADRIAN Zavala CNP   Elastic Bandages & Supports (MEDICAL COMPRESSION STOCKINGS) MISC 1 each by Does not apply route daily Knee high 20-30 mmhg compression stockings both legs wear daily during day and off Qhs. Wear as tolerated. Do not wear if they cause increased pain.   ADRIAN Rowe CNP   Insulin Pen Needle (BD PEN NEEDLE MICRO U/F) 32G X 6 MM MISC USE TWICE DAILY  Jean Paul De Jesus MD   ONE TOUCH ULTRA TEST strip TEST THREE TIMES DAILY AS NEEDED  Jean Paul De Jesus MD   ONE TOUCH ULTRA TEST strip TEST THREE TIMES DAILY AS NEEDED  Marvin Gomez MD   atorvastatin (LIPITOR) 40 MG tablet TAKE 1 TABLET BY MOUTH DAILY  Ca Wilde MD   amLODIPine (NORVASC) 2.5 MG tablet TAKE 1 TABLET BY MOUTH DAILY  Ca Wilde MD   ibuprofen (ADVIL;MOTRIN) 800 MG tablet TAKE 1 TABLET BY MOUTH EVERY 8 HOURS AS NEEDED FOR PAIN  Ca Wilde MD   albuterol-ipratropium (COMBIVENT RESPIMAT)  MCG/ACT AERS inhaler Inhale 1 puff into the lungs every 4 hours as needed for Wheezing or Shortness of Breath  Yesica Salmon MD   Handicap Placard MISC by Does not apply route  Ca Wilde MD       Social History     Tobacco Use    Smoking status: Current Every Day Smoker     Packs/day: 1.00     Years: 43.00     Pack years: 43.00     Types: Cigarettes     Last attempt to quit: 3/16/2018     Years since quitting: 3.8    Smokeless tobacco: Never Used   Vaping Use    Vaping Use: Never used   Substance Use Topics    Alcohol use: No    Drug use: No            PHYSICAL EXAMINATION:  [ INSTRUCTIONS:  \"[x]\" Indicates a positive item  \"[]\" Indicates a negative item  -- DELETE ALL ITEMS NOT EXAMINED]  [] Alert  [] Oriented to person/place/time    [] No apparent distress  [] Toxic appearing    [] Face flushed appearing [] Sclera clear  [] Lips are cyanotic      [] Breathing appears normal  [] Appears tachypneic      [] Rash on visible skin    [] Cranial Nerves II-XII grossly intact    [] Motor grossly intact in visible upper extremities    [] Motor grossly intact in visible lower extremities    [] Normal Mood  [] Anxious appearing    [] Depressed appearing  [] Confused appearing      [] Poor short term memory  [] Poor long term memory    [] OTHER:      Due to this being a TeleHealth encounter, evaluation of the following organ systems is limited: Vitals/Constitutional/EENT/Resp/CV/GI//MS/Neuro/Skin/Heme-Lymph-Imm. ASSESSMENT/PLAN:     Diagnosis Orders   1. Type 2 diabetes mellitus with other specified complication, with long-term current use of insulin (HCC)  Basic Metabolic Panel    Hemoglobin A1C     Orders Placed This Encounter   Procedures    Basic Metabolic Panel     Standing Status:   Future     Standing Expiration Date:   1/28/2023    Hemoglobin A1C     Standing Status:   Future     Standing Expiration Date:   1/28/2023     Continue current dose of 75/25 Humalog 50 units twice daily A1c goal of 7 or lower patient to follow-up in 6 months time    An  electronic signature was used to authenticate this note.     --Risa Nunes MD on 1/28/2022 at 4:53 PM        Pursuant to the emergency declaration under the [facility], [address] [place] [address] and the [employer] and [employer] Act, this Virtual  Visit was conducted, with patient's consent, to reduce the patient's risk of exposure to COVID-19 and provide continuity of care for an established patient. Services were provided through a video synchronous discussion virtually to substitute for in-person clinic visit.

## 2022-01-31 DIAGNOSIS — G57.12 MERALGIA PARESTHETICA OF LEFT SIDE: ICD-10-CM

## 2022-01-31 RX ORDER — CARBAMAZEPINE 100 MG/1
100 TABLET, EXTENDED RELEASE ORAL 2 TIMES DAILY
Qty: 60 TABLET | Refills: 0 | OUTPATIENT
Start: 2022-01-31

## 2022-01-31 NOTE — TELEPHONE ENCOUNTER
Pharmacy is requesting medication refill. Please approve or deny this request.    Rx requested:  Requested Prescriptions     Pending Prescriptions Disp Refills    carBAMazepine (TEGRETOL-XR) 100 MG extended release tablet 60 tablet 0     Sig: Take 1 tablet by mouth 2 times daily         Last Office Visit:   7/19/2021      Next Visit Date:  No future appointments. Tried to reach patient to schedule appt and number has been diconected.

## 2022-02-05 ASSESSMENT — ENCOUNTER SYMPTOMS
RESPIRATORY NEGATIVE: 1
EYES NEGATIVE: 1

## 2022-03-11 DIAGNOSIS — E11.69 TYPE 2 DIABETES MELLITUS WITH OTHER SPECIFIED COMPLICATION, WITH LONG-TERM CURRENT USE OF INSULIN (HCC): ICD-10-CM

## 2022-03-11 DIAGNOSIS — Z79.4 TYPE 2 DIABETES MELLITUS WITH OTHER SPECIFIED COMPLICATION, WITH LONG-TERM CURRENT USE OF INSULIN (HCC): ICD-10-CM

## 2022-03-11 RX ORDER — INSULIN LISPRO 100 [IU]/ML
INJECTION, SUSPENSION SUBCUTANEOUS
Qty: 15 PEN | Refills: 3 | Status: SHIPPED | OUTPATIENT
Start: 2022-03-11 | End: 2022-05-27 | Stop reason: SDUPTHER

## 2022-03-11 NOTE — TELEPHONE ENCOUNTER
patient requesting medication refill. Please approve or deny this request.    Rx requested:  Requested Prescriptions     Pending Prescriptions Disp Refills    insulin lispro protamine & lispro (HUMALOG MIX 75/25 KWIKPEN) (75-25) 100 UNIT per ML SUPN injection pen 15 pen 3     Sig: Inject 50 units bid. E11.69         Last Office Visit:   1/28/2022      Next Visit Date:  No future appointments.

## 2022-03-29 RX ORDER — PEN NEEDLE, DIABETIC 32 GX 1/4"
NEEDLE, DISPOSABLE MISCELLANEOUS
Qty: 100 EACH | Refills: 5 | Status: SHIPPED | OUTPATIENT
Start: 2022-03-29 | End: 2022-05-17 | Stop reason: SDUPTHER

## 2022-05-17 RX ORDER — PEN NEEDLE, DIABETIC 32 GX 1/4"
NEEDLE, DISPOSABLE MISCELLANEOUS
Qty: 100 EACH | Refills: 5 | Status: SHIPPED | OUTPATIENT
Start: 2022-05-17

## 2022-05-27 DIAGNOSIS — E11.69 TYPE 2 DIABETES MELLITUS WITH OTHER SPECIFIED COMPLICATION, WITH LONG-TERM CURRENT USE OF INSULIN (HCC): ICD-10-CM

## 2022-05-27 DIAGNOSIS — Z79.4 TYPE 2 DIABETES MELLITUS WITH OTHER SPECIFIED COMPLICATION, WITH LONG-TERM CURRENT USE OF INSULIN (HCC): ICD-10-CM

## 2022-05-27 RX ORDER — INSULIN LISPRO 100 [IU]/ML
INJECTION, SUSPENSION SUBCUTANEOUS
Qty: 15 PEN | Refills: 3 | Status: SHIPPED | OUTPATIENT
Start: 2022-05-27 | End: 2022-07-27

## 2022-07-27 DIAGNOSIS — Z79.4 TYPE 2 DIABETES MELLITUS WITH OTHER SPECIFIED COMPLICATION, WITH LONG-TERM CURRENT USE OF INSULIN (HCC): ICD-10-CM

## 2022-07-27 DIAGNOSIS — E11.69 TYPE 2 DIABETES MELLITUS WITH OTHER SPECIFIED COMPLICATION, WITH LONG-TERM CURRENT USE OF INSULIN (HCC): ICD-10-CM

## 2022-07-27 RX ORDER — INSULIN LISPRO 100 [IU]/ML
INJECTION, SUSPENSION SUBCUTANEOUS
Qty: 15 ML | Refills: 0 | Status: SHIPPED | OUTPATIENT
Start: 2022-07-27

## 2022-12-27 DIAGNOSIS — Z79.4 TYPE 2 DIABETES MELLITUS WITH OTHER SPECIFIED COMPLICATION, WITH LONG-TERM CURRENT USE OF INSULIN (HCC): ICD-10-CM

## 2022-12-27 DIAGNOSIS — E11.69 TYPE 2 DIABETES MELLITUS WITH OTHER SPECIFIED COMPLICATION, WITH LONG-TERM CURRENT USE OF INSULIN (HCC): ICD-10-CM

## 2022-12-27 RX ORDER — INSULIN LISPRO 100 [IU]/ML
INJECTION, SUSPENSION SUBCUTANEOUS
Qty: 15 ML | Refills: 0 | Status: SHIPPED | OUTPATIENT
Start: 2022-12-27

## 2023-01-03 DIAGNOSIS — Z79.4 TYPE 2 DIABETES MELLITUS WITH OTHER SPECIFIED COMPLICATION, WITH LONG-TERM CURRENT USE OF INSULIN (HCC): ICD-10-CM

## 2023-01-03 DIAGNOSIS — E11.69 TYPE 2 DIABETES MELLITUS WITH OTHER SPECIFIED COMPLICATION, WITH LONG-TERM CURRENT USE OF INSULIN (HCC): ICD-10-CM

## 2023-01-03 LAB
ANION GAP SERPL CALCULATED.3IONS-SCNC: 12 MEQ/L (ref 9–15)
BUN BLDV-MCNC: 7 MG/DL (ref 8–23)
CALCIUM SERPL-MCNC: 8.8 MG/DL (ref 8.5–9.9)
CHLORIDE BLD-SCNC: 107 MEQ/L (ref 95–107)
CO2: 23 MEQ/L (ref 20–31)
CREAT SERPL-MCNC: 0.55 MG/DL (ref 0.5–0.9)
GFR SERPL CREATININE-BSD FRML MDRD: >60 ML/MIN/{1.73_M2}
GLUCOSE BLD-MCNC: 144 MG/DL (ref 70–99)
HBA1C MFR BLD: 8.2 % (ref 4.8–5.9)
POTASSIUM SERPL-SCNC: 3.9 MEQ/L (ref 3.4–4.9)
SODIUM BLD-SCNC: 142 MEQ/L (ref 135–144)

## 2023-01-12 ENCOUNTER — TELEMEDICINE (OUTPATIENT)
Dept: ENDOCRINOLOGY | Age: 63
End: 2023-01-12
Payer: MEDICARE

## 2023-01-12 DIAGNOSIS — Z79.4 TYPE 2 DIABETES MELLITUS WITH OTHER SPECIFIED COMPLICATION, WITH LONG-TERM CURRENT USE OF INSULIN (HCC): Primary | ICD-10-CM

## 2023-01-12 DIAGNOSIS — E11.69 TYPE 2 DIABETES MELLITUS WITH OTHER SPECIFIED COMPLICATION, WITH LONG-TERM CURRENT USE OF INSULIN (HCC): Primary | ICD-10-CM

## 2023-01-12 PROCEDURE — 99442 PR PHYS/QHP TELEPHONE EVALUATION 11-20 MIN: CPT | Performed by: INTERNAL MEDICINE

## 2023-01-12 RX ORDER — INSULIN LISPRO 100 [IU]/ML
INJECTION, SUSPENSION SUBCUTANEOUS
Qty: 15 ML | Refills: 5 | Status: SHIPPED | OUTPATIENT
Start: 2023-01-12

## 2023-01-12 NOTE — PROGRESS NOTES
There is     TELEHEALTH EVALUATION -- Audio/Visual (During QQJZR-93 public health emergency)    Due to COVID 19 outbreak, patient's office visit was converted to a virtual visit. Patient was contacted and agreed to proceed with a virtual visit via Telephone Visit  The risks and benefits of converting to a virtual visit were discussed in light of the current infectious disease epidemic. Patient also understood that insurance coverage and co-pays are up to their individual insurance plans. HPI: Telephone visit patient was on hold I will arrange office follow-up on type 2 diabetes with fluctuating A1c last hemoglobin A1c was 8.2 up from 6.5 oh currently on 75/25 Humalog 50 units twice daily average blood sugars close to 180/200 denies any hypoglycemia    Sahun Andujar (:  1960) has requested an audio/video evaluation for the following concern(s):        Lab Results   Component Value Date     2023    K 3.9 2023     2023    CO2 23 2023    BUN 7 (L) 2023    CREATININE 0.55 2023    GLUCOSE 144 (H) 2023    CALCIUM 8.8 2023    PROT 6.7 2022    LABALBU 4.0 2022    BILITOT 0.7 2022    ALKPHOS 104 2022    AST 23 2022    ALT 21 2022    LABGLOM >60.0 2023    GFRAA >60.0 2021    GLOB 2.4 03/15/2019          Latest Reference Range & Units 21 09:47 21 11:04 1/3/23 10:06   Hemoglobin A1C 4.8 - 5.9 % 6.8 (H) 6.5 (H) 8.2 (H)   (H): Data is abnormally high    Review of Systems   Eyes: Negative. Cardiovascular: Negative. All other systems reviewed and are negative. Prior to Visit Medications    Medication Sig Taking?  Authorizing Provider   insulin lispro protamine & lispro (HUMALOG MIX 75/25 KWIKPEN) (75-25) 100 UNIT per ML SUPN injection pen ADMINISTER 50 UNITS UNDER THE SKIN TWICE DAILY  ALVINO Franks   Insulin Pen Needle (BD PEN NEEDLE MICRO U/F) 32G X 6 MM MISC USE TWICE DAILY Garrett Almazan MD   carBAMazepine (TEGRETOL-XR) 100 MG extended release tablet Take 1 tablet by mouth 2 times daily  ADRIAN Molina - CNP   Elastic Bandages & Supports (MEDICAL COMPRESSION STOCKINGS) MISC 1 each by Does not apply route daily Knee high 20-30 mmhg compression stockings both legs wear daily during day and off Qhs. Wear as tolerated. Do not wear if they cause increased pain. ADRIAN Rowe CNP   ONE TOUCH ULTRA TEST strip TEST THREE TIMES DAILY AS NEEDED  Jean Paul De Jesus MD   ONE TOUCH ULTRA TEST strip TEST THREE TIMES DAILY AS NEEDED  Garrett Almazan MD   atorvastatin (LIPITOR) 40 MG tablet TAKE 1 TABLET BY MOUTH DAILY  Lolis Castle MD   amLODIPine (NORVASC) 2.5 MG tablet TAKE 1 TABLET BY MOUTH DAILY  Lolis Castle MD   ibuprofen (ADVIL;MOTRIN) 800 MG tablet TAKE 1 TABLET BY MOUTH EVERY 8 HOURS AS NEEDED FOR PAIN  Lolis Castle MD   albuterol-ipratropium (COMBIVENT RESPIMAT)  MCG/ACT AERS inhaler Inhale 1 puff into the lungs every 4 hours as needed for Wheezing or Shortness of Breath  Arlyn Ulrich MD   Handicap Placard MISC by Does not apply route  Lolis Castle MD       Social History     Tobacco Use    Smoking status: Every Day     Packs/day: 1.00     Years: 43.00     Pack years: 43.00     Types: Cigarettes     Last attempt to quit: 3/16/2018     Years since quittin.8    Smokeless tobacco: Never   Vaping Use    Vaping Use: Never used   Substance Use Topics    Alcohol use: No    Drug use:  No            PHYSICAL EXAMINATION:  [ INSTRUCTIONS:  \"[x]\" Indicates a positive item  \"[]\" Indicates a negative item  -- DELETE ALL ITEMS NOT EXAMINED]  [] Alert  [] Oriented to person/place/time    [] No apparent distress  [] Toxic appearing    [] Face flushed appearing [] Sclera clear  [] Lips are cyanotic      [] Breathing appears normal  [] Appears tachypneic      [] Rash on visible skin    [] Cranial Nerves II-XII grossly intact    [] Motor grossly intact in visible upper extremities    [] Motor grossly intact in visible lower extremities    [] Normal Mood  [] Anxious appearing    [] Depressed appearing  [] Confused appearing      [] Poor short term memory  [] Poor long term memory    [] OTHER:      Due to this being a TeleHealth encounter, evaluation of the following organ systems is limited: Vitals/Constitutional/EENT/Resp/CV/GI//MS/Neuro/Skin/Heme-Lymph-Imm. ASSESSMENT/PLAN:     Diagnosis Orders   1. Type 2 diabetes mellitus with other specified complication, with long-term current use of insulin (HCC)  insulin lispro protamine & lispro (HUMALOG MIX 75/25 KWIKPEN) (75-25) 100 UNIT per ML SUPN injection pen    Basic Metabolic Panel    Hemoglobin A1C        Orders Placed This Encounter   Procedures    Basic Metabolic Panel     Standing Status:   Future     Standing Expiration Date:   1/12/2024    Hemoglobin A1C     Standing Status:   Future     Standing Expiration Date:   1/12/2024     Orders Placed This Encounter   Medications    insulin lispro protamine & lispro (HUMALOG MIX 75/25 KWIKPEN) (75-25) 100 UNIT per ML SUPN injection pen     Sig: ADMINISTER 50 UNITS UNDER THE SKIN TWICE DAILY     Dispense:  15 mL     Refill:  5     Continue current insulin regimen patient to follow-up in 2 to 3 months time A1c goal of 7 or lower    Total time spent 15 minutes    An  electronic signature was used to authenticate this note. --Samia Brothers MD on 1/12/2023 at 4:57 PM        Pursuant to the emergency declaration under the [address] [place] [address], [facility] waiver authority and the [employer] and [employer] Act, this Virtual  Visit was conducted, with patient's consent, to reduce the patient's risk of exposure to COVID-19 and provide continuity of care for an established patient. Services were provided through a video synchronous discussion virtually to substitute for in-person clinic visit.

## 2023-01-15 ASSESSMENT — ENCOUNTER SYMPTOMS: EYES NEGATIVE: 1

## 2023-03-08 ENCOUNTER — HOSPITAL ENCOUNTER (OUTPATIENT)
Dept: MRI IMAGING | Age: 63
Discharge: HOME | End: 2023-03-10
Payer: MEDICARE

## 2023-03-08 DIAGNOSIS — M54.12 CERVICAL RADICULOPATHY: ICD-10-CM

## 2023-03-08 PROCEDURE — 72141 MRI NECK SPINE W/O DYE: CPT

## 2023-03-13 RX ORDER — PEN NEEDLE, DIABETIC 32 GX 1/4"
NEEDLE, DISPOSABLE MISCELLANEOUS
Qty: 100 EACH | Refills: 5 | Status: SHIPPED | OUTPATIENT
Start: 2023-03-13

## 2023-03-29 DIAGNOSIS — Z79.4 TYPE 2 DIABETES MELLITUS WITH OTHER SPECIFIED COMPLICATION, WITH LONG-TERM CURRENT USE OF INSULIN (HCC): ICD-10-CM

## 2023-03-29 DIAGNOSIS — E11.65 TYPE 2 DIABETES MELLITUS WITH HYPERGLYCEMIA, WITH LONG-TERM CURRENT USE OF INSULIN (MULTI): Primary | ICD-10-CM

## 2023-03-29 DIAGNOSIS — E11.9 TYPE 2 DIABETES MELLITUS WITHOUT COMPLICATION, WITH LONG-TERM CURRENT USE OF INSULIN (MULTI): ICD-10-CM

## 2023-03-29 DIAGNOSIS — Z79.4 TYPE 2 DIABETES MELLITUS WITHOUT COMPLICATION, WITH LONG-TERM CURRENT USE OF INSULIN (MULTI): ICD-10-CM

## 2023-03-29 DIAGNOSIS — Z79.4 TYPE 2 DIABETES MELLITUS WITH HYPERGLYCEMIA, WITH LONG-TERM CURRENT USE OF INSULIN (MULTI): Primary | ICD-10-CM

## 2023-03-29 DIAGNOSIS — E11.69 TYPE 2 DIABETES MELLITUS WITH OTHER SPECIFIED COMPLICATION, WITH LONG-TERM CURRENT USE OF INSULIN (HCC): ICD-10-CM

## 2023-03-29 PROBLEM — M17.11 RIGHT KNEE DJD: Status: ACTIVE | Noted: 2023-03-29

## 2023-03-29 PROBLEM — M47.812 DJD (DEGENERATIVE JOINT DISEASE), CERVICAL: Status: ACTIVE | Noted: 2023-03-29

## 2023-03-29 PROBLEM — M43.6 NECK STIFFNESS: Status: ACTIVE | Noted: 2023-03-29

## 2023-03-29 PROBLEM — R94.31 PROLONGED QT INTERVAL: Status: ACTIVE | Noted: 2023-03-29

## 2023-03-29 PROBLEM — I10 ESSENTIAL HYPERTENSION: Status: ACTIVE | Noted: 2023-03-29

## 2023-03-29 PROBLEM — E78.5 HYPERLIPIDEMIA: Status: ACTIVE | Noted: 2023-03-29

## 2023-03-29 PROBLEM — M54.12 CERVICAL RADICULOPATHY: Status: ACTIVE | Noted: 2023-03-29

## 2023-03-29 PROBLEM — Q24.8 PERICARDIAL CYST (HHS-HCC): Status: ACTIVE | Noted: 2023-03-29

## 2023-03-29 PROBLEM — I44.0 FIRST DEGREE AV BLOCK: Status: ACTIVE | Noted: 2023-03-29

## 2023-03-29 PROBLEM — M77.31 BILATERAL CALCANEAL SPURS: Status: ACTIVE | Noted: 2023-03-29

## 2023-03-29 PROBLEM — L57.0 ACTINIC KERATOSIS: Status: ACTIVE | Noted: 2023-03-29

## 2023-03-29 PROBLEM — M25.529 ELBOW PAIN: Status: ACTIVE | Noted: 2023-03-29

## 2023-03-29 PROBLEM — K80.20 GALL STONES: Status: ACTIVE | Noted: 2023-03-29

## 2023-03-29 PROBLEM — S83.91XA SPRAIN OF RIGHT KNEE: Status: ACTIVE | Noted: 2023-03-29

## 2023-03-29 PROBLEM — E66.9 CLASS 2 OBESITY WITH BODY MASS INDEX (BMI) OF 39.0 TO 39.9 IN ADULT: Status: ACTIVE | Noted: 2023-03-29

## 2023-03-29 PROBLEM — G56.22 CUBITAL TUNNEL SYNDROME ON LEFT: Status: ACTIVE | Noted: 2023-03-29

## 2023-03-29 PROBLEM — M23.90 INTERNAL DERANGEMENT OF KNEE: Status: ACTIVE | Noted: 2023-03-29

## 2023-03-29 PROBLEM — M17.11 OSTEOARTHRITIS OF RIGHT KNEE: Status: ACTIVE | Noted: 2023-03-29

## 2023-03-29 PROBLEM — S82.143A TIBIAL PLATEAU FRACTURE: Status: ACTIVE | Noted: 2023-03-29

## 2023-03-29 PROBLEM — E78.2 MIXED HYPERLIPIDEMIA: Status: ACTIVE | Noted: 2023-03-29

## 2023-03-29 PROBLEM — J20.9 ACUTE BRONCHITIS: Status: ACTIVE | Noted: 2023-03-29

## 2023-03-29 PROBLEM — M19.90 GENERALIZED ARTHRITIS: Status: ACTIVE | Noted: 2023-03-29

## 2023-03-29 PROBLEM — M77.32 BILATERAL CALCANEAL SPURS: Status: ACTIVE | Noted: 2023-03-29

## 2023-03-29 PROBLEM — R31.9 HEMATURIA OF UNKNOWN ETIOLOGY: Status: ACTIVE | Noted: 2023-03-29

## 2023-03-29 PROBLEM — S83.90XA KNEE SPRAIN: Status: ACTIVE | Noted: 2023-03-29

## 2023-03-29 PROBLEM — S82.121A CLOSED FRACTURE OF LATERAL PORTION OF RIGHT TIBIAL PLATEAU: Status: ACTIVE | Noted: 2023-03-29

## 2023-03-29 PROBLEM — F17.200 CURRENT SMOKER: Status: ACTIVE | Noted: 2023-03-29

## 2023-03-29 PROBLEM — G56.00 CARPAL TUNNEL SYNDROME: Status: ACTIVE | Noted: 2023-03-29

## 2023-03-29 PROBLEM — J20.9 ACUTE BRONCHITIS: Status: RESOLVED | Noted: 2023-03-29 | Resolved: 2023-03-29

## 2023-03-29 PROBLEM — R93.89 IMAGING ABNORMALITIES: Status: ACTIVE | Noted: 2023-03-29

## 2023-03-29 PROBLEM — E66.812 CLASS 2 OBESITY WITH BODY MASS INDEX (BMI) OF 39.0 TO 39.9 IN ADULT: Status: ACTIVE | Noted: 2023-03-29

## 2023-03-29 LAB
ANION GAP SERPL CALCULATED.3IONS-SCNC: 15 MEQ/L (ref 9–15)
BUN SERPL-MCNC: 8 MG/DL (ref 8–23)
CALCIUM SERPL-MCNC: 8.8 MG/DL (ref 8.5–9.9)
CHLORIDE SERPL-SCNC: 105 MEQ/L (ref 95–107)
CO2 SERPL-SCNC: 21 MEQ/L (ref 20–31)
CREAT SERPL-MCNC: 0.62 MG/DL (ref 0.5–0.9)
GLUCOSE SERPL-MCNC: 215 MG/DL (ref 70–99)
HBA1C MFR BLD: 7.9 % (ref 4.8–5.9)
POTASSIUM SERPL-SCNC: 3.4 MEQ/L (ref 3.4–4.9)
SODIUM SERPL-SCNC: 141 MEQ/L (ref 135–144)

## 2023-03-29 RX ORDER — INSULIN LISPRO 100 [IU]/ML
INJECTION, SUSPENSION SUBCUTANEOUS
COMMUNITY

## 2023-03-29 RX ORDER — IBUPROFEN 800 MG/1
1 TABLET ORAL EVERY 8 HOURS PRN
COMMUNITY
Start: 2019-07-22 | End: 2024-01-22 | Stop reason: SDUPTHER

## 2023-03-29 RX ORDER — ATORVASTATIN CALCIUM 40 MG/1
1 TABLET, FILM COATED ORAL DAILY
COMMUNITY
Start: 2019-07-22 | End: 2024-01-03 | Stop reason: SDUPTHER

## 2023-03-29 RX ORDER — AMLODIPINE BESYLATE 2.5 MG/1
1 TABLET ORAL DAILY
COMMUNITY
Start: 2019-07-22 | End: 2023-10-05 | Stop reason: SDUPTHER

## 2023-03-29 RX ORDER — PEN NEEDLE, DIABETIC 32 GX 1/4"
NEEDLE, DISPOSABLE MISCELLANEOUS
COMMUNITY
Start: 2023-03-14

## 2023-03-29 RX ORDER — IPRATROPIUM BROMIDE AND ALBUTEROL 20; 100 UG/1; UG/1
SPRAY, METERED RESPIRATORY (INHALATION)
COMMUNITY
End: 2023-09-06 | Stop reason: SDUPTHER

## 2023-03-29 RX ORDER — BLOOD SUGAR DIAGNOSTIC
STRIP MISCELLANEOUS
COMMUNITY
Start: 2019-07-22

## 2023-03-30 ENCOUNTER — TELEMEDICINE (OUTPATIENT)
Dept: PHARMACY | Facility: HOSPITAL | Age: 63
End: 2023-03-30
Payer: MEDICARE

## 2023-03-30 DIAGNOSIS — E11.9 TYPE 2 DIABETES MELLITUS WITHOUT COMPLICATION, WITH LONG-TERM CURRENT USE OF INSULIN (MULTI): ICD-10-CM

## 2023-03-30 DIAGNOSIS — E11.65 TYPE 2 DIABETES MELLITUS WITH HYPERGLYCEMIA, WITH LONG-TERM CURRENT USE OF INSULIN (MULTI): ICD-10-CM

## 2023-03-30 DIAGNOSIS — Z79.4 TYPE 2 DIABETES MELLITUS WITHOUT COMPLICATION, WITH LONG-TERM CURRENT USE OF INSULIN (MULTI): ICD-10-CM

## 2023-03-30 DIAGNOSIS — Z79.4 TYPE 2 DIABETES MELLITUS WITH HYPERGLYCEMIA, WITH LONG-TERM CURRENT USE OF INSULIN (MULTI): ICD-10-CM

## 2023-03-30 NOTE — PROGRESS NOTES
Patient is sent at the request of Mayra Lewis * for my opinion regarding Type 2 diabetes.  My final recommendations will be communicated back to the requesting provider by way of shared medical record.    Subjective     Allergies   Allergen Reactions    Narcof [Hydrocodone-Guaifenesin] Unknown    Narcosoft Ii Unknown    Norco [Hydrocodone-Acetaminophen] Unknown     Current monitoring regimen:   Patient is using: glucometer  Any episodes of hypoglycemia? no    Diabetes Pharmacotherapy:    -Humalog Mix 75/25 KwikPen inject 50 units 2 times a day    Blood Sugar Assessment:  -Patient has not been taking her blood sugars as often as she should. Today, she states her FBG was 112. Some other readings she could remember are 126 and 98. Patient denies any symptoms of high blood sugar or low blood sugar.    Secondary Prevention:  - Statin? Yes  - ACE-I/ARB? No  - Aspirin? No    Lab Review  Lab Results   Component Value Date    BILITOT 0.7 11/30/2022    CALCIUM 8.9 11/30/2022    CO2 25 11/30/2022     11/30/2022    CREATININE 0.71 11/30/2022    GLUCOSE 236 (H) 11/30/2022    ALKPHOS 104 11/30/2022    K 3.8 11/30/2022    PROT 6.7 11/30/2022     11/30/2022    AST 23 11/30/2022    ALT 21 11/30/2022    BUN 16 11/30/2022    ANIONGAP 13 11/30/2022    ALBUMIN 4.0 11/30/2022    GFRF >90 11/30/2022     Lab Results   Component Value Date    TRIG 169 (H) 01/11/2022    CHOL 151 01/11/2022    HDL 55.0 01/11/2022     Lab Results   Component Value Date    HGBA1C 7.4 (A) 11/30/2022     The ASCVD Risk score (Sciota DK, et al., 2019) failed to calculate for the following reasons:    The smoking status is invalid    Drug Interactions:  No significant drug-drug interactions exist that require adjustment to therapy    Type 2 diabetes mellitus, is not at goal. Patients most recent A1c is 7.4%    ASSESSMENT/PLAN  Patient has previously trialed Metformin and failed due to diarrhea. I spoke to the patient about potentially adding  a once weekly injectable to her medication regimen to help with blood sugar control as well as weight loss. Patient states she is interested and has heard of these medications. The patient has an endocrinology appointment coming up with an outside provider, Dr. Fields, and stated she would ask him about starting one of these medications. May also consider an oral option such as Januvia to hopefully decrease the insulin dose.  Patient states she is worried about cost of a weekly injectable. She states she may qualify for  PAP, and I gave her my direct phone number to call if she has any questions or concerns once she is prescribed the injectable.   At this time, patient still smokes about 1/2 PPD of cigarettes. The patient is still not interested in using nicotine replacement therapy, but encouraged patient to quit. Will reassess at the next appointment.   Patient is excited for the warmer weather because she likes to take her grandkids to the park and she can start walking outside. Will encourage patient to start walking more often and continue working towards an active and healthy lifestyle.     Please reach out to the Clinical Pharmacy Team if there are any additional questions.     Clinical Follow-Up: As needed, patient states she will call after her endocrinology appointment    Verbal consent to manage patient's drug therapy was obtained from patient. They were informed they may decline to participate or withdraw from participation in pharmacy services at any time.    Encounter was completed and documented by pharmacy APPE student, Patty Yoder. I have reviewed the above and attest it is accurate and complete.    Thank you,  Betsy Cramer, PharmD   Meds PGY1 Pharmacy Resident  [phone]

## 2023-03-31 NOTE — PROGRESS NOTES
I reviewed the progress note and agree with the resident’s findings and plans as written. Case discussed with resident.    Luz Sharp, JonelleD

## 2023-04-27 ENCOUNTER — OFFICE VISIT (OUTPATIENT)
Dept: PRIMARY CARE | Facility: CLINIC | Age: 63
End: 2023-04-27
Payer: MEDICARE

## 2023-04-27 VITALS
DIASTOLIC BLOOD PRESSURE: 84 MMHG | WEIGHT: 227 LBS | OXYGEN SATURATION: 96 % | HEIGHT: 64 IN | RESPIRATION RATE: 16 BRPM | BODY MASS INDEX: 38.76 KG/M2 | HEART RATE: 90 BPM | SYSTOLIC BLOOD PRESSURE: 124 MMHG | TEMPERATURE: 97.3 F

## 2023-04-27 DIAGNOSIS — M54.12 CERVICAL RADICULOPATHY: ICD-10-CM

## 2023-04-27 DIAGNOSIS — J20.9 ACUTE BRONCHITIS, UNSPECIFIED ORGANISM: Primary | ICD-10-CM

## 2023-04-27 DIAGNOSIS — E11.69 TYPE 2 DIABETES MELLITUS WITH OTHER SPECIFIED COMPLICATION, WITH LONG-TERM CURRENT USE OF INSULIN (HCC): ICD-10-CM

## 2023-04-27 DIAGNOSIS — J44.9 CHRONIC OBSTRUCTIVE PULMONARY DISEASE, UNSPECIFIED COPD TYPE (MULTI): ICD-10-CM

## 2023-04-27 DIAGNOSIS — Z79.4 TYPE 2 DIABETES MELLITUS WITH OTHER SPECIFIED COMPLICATION, WITH LONG-TERM CURRENT USE OF INSULIN (HCC): ICD-10-CM

## 2023-04-27 PROBLEM — M47.817 LUMBOSACRAL SPONDYLOSIS WITHOUT MYELOPATHY: Status: RESOLVED | Noted: 2017-11-20 | Resolved: 2023-04-27

## 2023-04-27 PROBLEM — M72.2 PLANTAR FASCIITIS: Status: RESOLVED | Noted: 2021-07-16 | Resolved: 2023-04-27

## 2023-04-27 PROBLEM — M21.622 TAILOR'S BUNIONETTE, BILATERAL: Status: RESOLVED | Noted: 2019-04-15 | Resolved: 2023-04-27

## 2023-04-27 PROBLEM — Z12.31 BREAST CANCER SCREENING BY MAMMOGRAM: Status: ACTIVE | Noted: 2023-04-27

## 2023-04-27 PROBLEM — M47.26 OSTEOARTHRITIS OF SPINE WITH RADICULOPATHY, LUMBAR REGION: Status: RESOLVED | Noted: 2017-07-11 | Resolved: 2023-04-27

## 2023-04-27 PROBLEM — M47.816 FACET HYPERTROPHY OF LUMBAR REGION: Status: RESOLVED | Noted: 2017-07-11 | Resolved: 2023-04-27

## 2023-04-27 PROBLEM — M77.32 CALCANEAL SPUR OF BOTH FEET: Status: ACTIVE | Noted: 2021-07-16

## 2023-04-27 PROBLEM — M79.675 PAIN IN TOES OF BOTH FEET: Status: RESOLVED | Noted: 2021-06-22 | Resolved: 2023-04-27

## 2023-04-27 PROBLEM — J32.0 MAXILLARY SINUSITIS: Status: RESOLVED | Noted: 2021-07-16 | Resolved: 2023-04-27

## 2023-04-27 PROBLEM — M20.12 HALLUX ABDUCTO VALGUS, BILATERAL: Status: RESOLVED | Noted: 2019-04-15 | Resolved: 2023-04-27

## 2023-04-27 PROBLEM — Z23 ENCOUNTER FOR IMMUNIZATION: Status: ACTIVE | Noted: 2023-04-27

## 2023-04-27 PROBLEM — L84 CALLUS: Status: RESOLVED | Noted: 2021-06-22 | Resolved: 2023-04-27

## 2023-04-27 PROBLEM — E11.42 DIABETIC POLYNEUROPATHY ASSOCIATED WITH TYPE 2 DIABETES MELLITUS (MULTI): Status: RESOLVED | Noted: 2019-04-15 | Resolved: 2023-04-27

## 2023-04-27 PROBLEM — M79.674 PAIN IN TOES OF BOTH FEET: Status: RESOLVED | Noted: 2021-06-22 | Resolved: 2023-04-27

## 2023-04-27 PROBLEM — I73.9 PERIPHERAL VASCULAR DISEASE OF FOOT (CMS-HCC): Status: RESOLVED | Noted: 2021-06-22 | Resolved: 2023-04-27

## 2023-04-27 PROBLEM — J96.90 RESPIRATORY FAILURE (MULTI): Status: RESOLVED | Noted: 2018-03-17 | Resolved: 2023-04-27

## 2023-04-27 PROBLEM — B35.1 ONYCHOMYCOSIS: Status: RESOLVED | Noted: 2021-06-22 | Resolved: 2023-04-27

## 2023-04-27 PROBLEM — M20.11 HALLUX ABDUCTO VALGUS, BILATERAL: Status: RESOLVED | Noted: 2019-04-15 | Resolved: 2023-04-27

## 2023-04-27 PROBLEM — L85.3 XEROSIS OF SKIN: Status: RESOLVED | Noted: 2021-06-22 | Resolved: 2023-04-27

## 2023-04-27 PROBLEM — M77.31 CALCANEAL SPUR OF BOTH FEET: Status: ACTIVE | Noted: 2021-07-16

## 2023-04-27 PROBLEM — H66.90 OTITIS MEDIA: Status: RESOLVED | Noted: 2021-07-16 | Resolved: 2023-04-27

## 2023-04-27 PROBLEM — M21.621 TAILOR'S BUNIONETTE, BILATERAL: Status: RESOLVED | Noted: 2019-04-15 | Resolved: 2023-04-27

## 2023-04-27 PROBLEM — J02.9 ACUTE PHARYNGITIS: Status: RESOLVED | Noted: 2018-03-16 | Resolved: 2023-04-27

## 2023-04-27 PROBLEM — R20.2 LEFT LEG PARESTHESIAS: Status: RESOLVED | Noted: 2023-04-27 | Resolved: 2023-04-27

## 2023-04-27 PROBLEM — R05.3 PERSISTENT COUGH: Status: RESOLVED | Noted: 2021-07-16 | Resolved: 2023-04-27

## 2023-04-27 PROCEDURE — 3079F DIAST BP 80-89 MM HG: CPT | Performed by: PHYSICIAN ASSISTANT

## 2023-04-27 PROCEDURE — 3074F SYST BP LT 130 MM HG: CPT | Performed by: PHYSICIAN ASSISTANT

## 2023-04-27 PROCEDURE — 99214 OFFICE O/P EST MOD 30 MIN: CPT | Performed by: PHYSICIAN ASSISTANT

## 2023-04-27 RX ORDER — FLUTICASONE FUROATE, UMECLIDINIUM BROMIDE AND VILANTEROL TRIFENATATE 100; 62.5; 25 UG/1; UG/1; UG/1
1 POWDER RESPIRATORY (INHALATION) DAILY
Qty: 60 EACH | Refills: 1 | Status: SHIPPED | OUTPATIENT
Start: 2023-04-27 | End: 2023-07-05

## 2023-04-27 RX ORDER — AZITHROMYCIN 250 MG/1
TABLET, FILM COATED ORAL
Qty: 6 TABLET | Refills: 0 | Status: SHIPPED | OUTPATIENT
Start: 2023-04-27 | End: 2023-05-02

## 2023-04-27 RX ORDER — INSULIN LISPRO 100 [IU]/ML
INJECTION, SUSPENSION SUBCUTANEOUS 2 TIMES DAILY
COMMUNITY
End: 2023-04-27 | Stop reason: ALTCHOICE

## 2023-04-27 RX ORDER — INSULIN LISPRO 100 [IU]/ML
INJECTION, SUSPENSION SUBCUTANEOUS
Qty: 15 ML | Refills: 5 | Status: SHIPPED | OUTPATIENT
Start: 2023-04-27

## 2023-04-27 RX ORDER — TRAMADOL HYDROCHLORIDE 50 MG/1
50 TABLET ORAL EVERY 8 HOURS
COMMUNITY
End: 2023-04-27 | Stop reason: ALTCHOICE

## 2023-04-27 NOTE — PROGRESS NOTES
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 PAT is 23.         Cortney  is a 62 y.o. woman with cervical spondylosis and disc hernation at C5-C6 presenting at the request of Dr. lopez for preop clearance for C5-C6 anterior cervical decompression and fusion now planned for 23.      PT'S MEDICAL HISTORY:  she does have T2DM (most recent A1c 7.9% 3/29/23, sees endo Dr. Fields, on Humalog mix 75-25 50U BID)  - She does have h/o COPD - uses Combivent but has been needing it daily lately, no maintenance inhaler   - She does report current infection/ bronchitis - had recent steroid course which helped a little but still with persistent sxs.   - Denies h/o anemia   - Denies known CAD, h/o MI   - Denies sleep apnea   - Denies thyroid disease   - Denies bleeding disorder or blood clotting disorder, easy burising, easy brusing   - Denies h/o prolonged steroid use    PAST SURGICAL HISTORY:  Past Surgical History:   Procedure Laterality Date     SECTION, CLASSIC  2019,     COLONOSCOPY  2020 Polyps Removed (Dr. Kamara) Due in     CYST REMOVAL  2019    2017 Left Cheek & Right Earlobe    CYSTOSCOPY  2021    Dr. Sanchez    LUMBAR DISC SURGERY  2019? Dr. Clay    US ASPIRATION INJECTION INTERMEDIATE JOINT  2021    US ASPIRATION INJECTION INTERMEDIATE JOINT 2021 [facility]      - Pt denies complications to these surgeries   - Pt denies issues with the anesthesia        MEDICATIONS:    Current Outpatient Medications:     amLODIPine (Norvasc) 2.5 mg tablet, Take 1 tablet (2.5 mg) by mouth once daily., Disp: , Rfl:     atorvastatin (Lipitor) 40 mg tablet, Take 1 tablet (40 mg) by mouth once daily., Disp: , Rfl:     BD Ultra-Fine Micro Pen Needle 32 gauge x 1/4\" needle, USE TWICE DAILY, Disp: , " Rfl:     blood sugar diagnostic strip, TEST THREE TIMES DAILY AS NEEDED, Disp: , Rfl:     Combivent Respimat  mcg/actuation inhaler, INHALE 1 INHALATION BY MOUTH FOUR TIMES DAILY. MAX OF 6 INHALATIONS PER 24 HOURS, Disp: , Rfl:     ibuprofen 800 mg tablet, Take 1 tablet (800 mg) by mouth every 8 hours if needed (pain)., Disp: , Rfl:     OneTouch Ultra Test strip, TEST BID AND PRN, Disp: , Rfl:     azithromycin (Zithromax) 250 mg tablet, Take 2 tablets (500 mg) by mouth once daily for 1 day, THEN 1 tablet (250 mg) once daily for 4 days. Take 2 tabs (500 mg) by mouth today, than 1 daily for 4 days.., Disp: 6 tablet, Rfl: 0    fluticasone-umeclidin-vilanter (Trelegy Ellipta) 100-62.5-25 mcg blister with device, Inhale 1 puff once daily., Disp: 60 each, Rfl: 1    HumaLOG Mix 75-25 KwikPen 100 unit/mL (75-25) injection, INJECT 50 UNIT Twice daily, Disp: , Rfl:    -  Will need to hold NSAIDs 10 days prior to surgery     ALLERGIES:  Allergies   Allergen Reactions    Norco [Hydrocodone-Acetaminophen] Unknown         SOCIAL HISTORY:  Tobacco - current, 20 pack year hx   Alcohol - not at all   Other substance use - none       FAMILY HISTORY:     Family History   Problem Relation Name Age of Onset    Diabetes Mother      Lung cancer Mother      Diabetes Sister      Kidney cancer Sister     - Manteral grandmother had a stroke   - No heart disease, bleeding or clotting disorders     FUNCTIONAL CAPACITY:   - using Duke Activity Status Index score: 34.7 points, 7.01 METs    Are you able to:  Take care of self (eating, dressing, bathing, using the toilet): Yes   Walk indoors: Yes   Walk 1-2 blocks on level ground: Yes   Climb a flight of stairs or walk up a hill: Yes   Run a short distance: No   Do light work around the house (dusting, washing dishes): Yes   Do moderate work around the house (vacuuming, sweeping floors, carrying in groceries): Yes   Do heavy work around the house (scrubbing floors, lifting or moving heavy  furniture): No   Do yardwork (raking leaves, weeding, pushing a power mower): Yes   Have sexual relations: Yes   Participate in moderate recreational activities (golf, bowling, dancing, doubles tennis, throwing a baseball or football): Yes   Participate in strenuous sports (swimming, singles tennis, football, basketball, skiing): No     REVISED CARDIAC RISK INDEX  - Elevated-risk surgery (Intraperitoneal; intrathoracic; suprainguinal vascular): No   - History of ischemic heart disease (History of myocardial infarction (MI); history of positive exercise test; current chest pain considered due to myocardial ischemia; use of nitrate therapy or ECG with pathological Q waves): No  - History of congestive heart failure (Pulmonary edema, bilateral rales or S3 gallop; paroxysmal nocturnal dyspnea; chest x-ray (CXR) showing pulmonary vascular redistribution): No  History of cerebrovascular disease (Prior transient ischemic attack (TIA) or stroke): No  - Pre-operative treatment with insulin: Yes  - Pre-operative creatinine >2 mg/dL / 176.8 µmol/L: No  Total Score is: 1    STOP-BANG SCORE  Do you snore loudly? (Louder than talking or loud enough to be heard through closed doors): Yes   Do you often feel tired, fatigued, or sleepy during the daytime?: Yes   Has anyone observed you stop breathing during sleep?: No   Do you have (or are you being treated for) high blood pressure?: Yes   Objective measures:  BMI > 35kg/m2: Yes   Age > 50 years: Yes    Neck circumference > 40: No   Male gender: No   Total Score: 5 which correlates with high risk of moderate to severe CELSO   She states she did have a sleep study 6-7 years ago and it was normal     Physical Exam  Constitutional:       Appearance: Normal appearance.   HENT:      Head: Normocephalic and atraumatic.      Right Ear: Tympanic membrane and ear canal normal.      Left Ear: Tympanic membrane and ear canal normal.      Nose: Nose normal.      Mouth/Throat:      Mouth: Mucous  membranes are moist.      Pharynx: Oropharynx is clear.   Eyes:      Extraocular Movements: Extraocular movements intact.      Conjunctiva/sclera: Conjunctivae normal.   Cardiovascular:      Rate and Rhythm: Normal rate and regular rhythm.      Pulses: Normal pulses.      Heart sounds: Normal heart sounds. No murmur heard.  Pulmonary:      Effort: Pulmonary effort is normal.      Breath sounds: Rhonchi present.   Musculoskeletal:         General: Normal range of motion.   Skin:     General: Skin is warm and dry.   Neurological:      General: No focal deficit present.      Mental Status: She is alert.   Psychiatric:         Mood and Affect: Mood and affect normal.          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     1.  CARDIAC EVALUATION   - Revised cardiac risk index sore is 1 point. Risk class II. 6.0% risk of death, MI or cardiac arrest   - acceptable risk     2.  PULMONARY EVALUATION  - She does have COPD overall well controlled although has acute bronchitis now and using Combivent more often. Will rx Trelegy today for better control.   - she  does not have dx of CELSO. her  STOP BANG score is 5 - high risk of moderate to severe CELSO.   - States she did have sleep study 5 years ago and it was normal     3.  HEMATOLOGIC EVALUATION  -  No history of anemia   -  Bleeding risk is low - no h/o bleeding disorder - denies easy bruising, frequent nosebleeds.   -  No h/o blood clot or clotting disorder   -  Pt was encouraged to stop NSAIDs at least 10 days prior to surgery     4.  ENDOCRINE EVALUATION  - She does have T2DM - most recent A1c 7.9% 3/29/23, sees kaye Fields, on Humalog mix 75-25 50U BID  -  No recent prolonged steroid use or other know risk factor for adrenal insufficiency.      5.  RENAL EVALUATION  -  No CKD  -  No risks identified     6. GI EVALUATION  -  No history of liver disease   -  No history of alcohol abuse   -  No risks identified     7. CURRENT ACUTE BRONCHITIS:  - Given zpack. Follow up after completion if sxs persist     If any concerning abnormalities are found in her preop screening blood work, ECG or urinalysis, please inform me for updated recommendations.     Thank you,   LONNIE Hugo

## 2023-05-01 LAB
ALBUMIN SERPL-MCNC: 3.6 G/DL (ref 3.5–4.6)
ALP SERPL-CCNC: 98 U/L (ref 40–130)
ALT SERPL-CCNC: 18 U/L (ref 0–33)
ANION GAP SERPL CALCULATED.3IONS-SCNC: 12 MEQ/L (ref 9–15)
AST SERPL-CCNC: 15 U/L (ref 0–35)
BILIRUB DIRECT SERPL-MCNC: <0.2 MG/DL (ref 0–0.4)
BILIRUB INDIRECT SERPL-MCNC: NORMAL MG/DL (ref 0–0.6)
BILIRUB SERPL-MCNC: 0.5 MG/DL (ref 0.2–0.7)
BUN SERPL-MCNC: 8 MG/DL (ref 8–23)
CALCIUM SERPL-MCNC: 8.9 MG/DL (ref 8.5–9.9)
CHLORIDE SERPL-SCNC: 105 MEQ/L (ref 95–107)
CHOLEST SERPL-MCNC: 118 MG/DL (ref 0–199)
CO2 SERPL-SCNC: 26 MEQ/L (ref 20–31)
CREAT SERPL-MCNC: 0.63 MG/DL (ref 0.5–0.9)
GLUCOSE SERPL-MCNC: 132 MG/DL (ref 70–99)
HBA1C MFR BLD: 7.8 % (ref 4.8–5.9)
HDLC SERPL-MCNC: 47 MG/DL (ref 40–59)
LDLC SERPL CALC-MCNC: 50 MG/DL (ref 0–129)
POTASSIUM SERPL-SCNC: 3.5 MEQ/L (ref 3.4–4.9)
PROT SERPL-MCNC: 6.4 G/DL (ref 6.3–8)
SODIUM SERPL-SCNC: 143 MEQ/L (ref 135–144)
TRIGL SERPL-MCNC: 103 MG/DL (ref 0–150)

## 2023-05-11 ENCOUNTER — OFFICE VISIT (OUTPATIENT)
Dept: ENDOCRINOLOGY | Age: 63
End: 2023-05-11

## 2023-05-11 VITALS
HEART RATE: 90 BPM | BODY MASS INDEX: 38.41 KG/M2 | DIASTOLIC BLOOD PRESSURE: 84 MMHG | SYSTOLIC BLOOD PRESSURE: 125 MMHG | HEIGHT: 64 IN | WEIGHT: 225 LBS | OXYGEN SATURATION: 93 %

## 2023-05-11 DIAGNOSIS — Z79.4 TYPE 2 DIABETES MELLITUS WITH OTHER SPECIFIED COMPLICATION, WITH LONG-TERM CURRENT USE OF INSULIN (HCC): Primary | ICD-10-CM

## 2023-05-11 DIAGNOSIS — E11.69 TYPE 2 DIABETES MELLITUS WITH OTHER SPECIFIED COMPLICATION, WITH LONG-TERM CURRENT USE OF INSULIN (HCC): Primary | ICD-10-CM

## 2023-05-11 LAB
CHP ED QC CHECK: NORMAL
GLUCOSE BLD-MCNC: 139 MG/DL

## 2023-05-11 ASSESSMENT — ENCOUNTER SYMPTOMS: EYES NEGATIVE: 1

## 2023-05-11 NOTE — PROGRESS NOTES
5/11/2023    Assessment:       Diagnosis Orders   1. Type 2 diabetes mellitus with other specified complication, with long-term current use of insulin ([facility])  POCT Glucose            PLAN:     Orders Placed This Encounter   Procedures    Basic Metabolic Panel     Standing Status:   Future     Standing Expiration Date:   5/11/2024    Hemoglobin A1C     Standing Status:   Future     Standing Expiration Date:   5/11/2024    Microalbumin / Creatinine Urine Ratio     Standing Status:   Future     Standing Expiration Date:   5/11/2024    POCT Glucose       Continue current dose of insulin 50 units twice daily follow-up in 3 to 6 months time    Orders Placed This Encounter   Procedures    POCT Glucose     No orders of the defined types were placed in this encounter. No follow-ups on file. Subjective:     Chief Complaint   Patient presents with    Diabetes     Vitals:    05/11/23 1402   BP: 125/84   Site: Left Upper Arm   Position: Sitting   Cuff Size: Large Adult   Pulse: 90   SpO2: 93%   Weight: 225 lb (102.1 kg)   Height: 5' 4\" (1.626 m)     Wt Readings from Last 3 Encounters:   05/11/23 225 lb (102.1 kg)   07/19/21 233 lb (105.7 kg)   07/15/21 209 lb (94.8 kg)     BP Readings from Last 3 Encounters:   05/11/23 125/84   07/19/21 132/82   07/15/21 138/76     Follow-up on type 2 diabetes obesity patient on 75/25 Humalog 50 units twice daily hemoglobin A1c was at 7.8 average blood sugars close to 180 they have been fluctuating    Diabetes  She presents for her follow-up diabetic visit. She has type 2 diabetes mellitus. Her disease course has been fluctuating. There are no hypoglycemic associated symptoms. Pertinent negatives for diabetes include no polydipsia and no polyuria. Risk factors for coronary artery disease include obesity. Current diabetic treatment includes insulin injections. She is currently taking insulin pre-breakfast and pre-dinner. Her overall blood glucose range is 180-200 mg/dl.    Past Medical History:

## 2023-05-16 LAB
LV EF: 68 %
LVEF MODALITY: NORMAL

## 2023-05-22 ENCOUNTER — HOSPITAL ENCOUNTER (OUTPATIENT)
Dept: DATA CONVERSION | Facility: HOSPITAL | Age: 63
End: 2023-05-22
Attending: ORTHOPAEDIC SURGERY | Admitting: ORTHOPAEDIC SURGERY
Payer: MEDICARE

## 2023-05-22 DIAGNOSIS — R94.31 ABNORMAL ELECTROCARDIOGRAM (ECG) (EKG): ICD-10-CM

## 2023-05-22 DIAGNOSIS — E11.42 TYPE 2 DIABETES MELLITUS WITH DIABETIC POLYNEUROPATHY (MULTI): ICD-10-CM

## 2023-05-22 DIAGNOSIS — M48.02 SPINAL STENOSIS, CERVICAL REGION: ICD-10-CM

## 2023-05-22 DIAGNOSIS — J44.9 CHRONIC OBSTRUCTIVE PULMONARY DISEASE, UNSPECIFIED (MULTI): ICD-10-CM

## 2023-05-22 DIAGNOSIS — M50.322 OTHER CERVICAL DISC DEGENERATION AT C5-C6 LEVEL: ICD-10-CM

## 2023-05-22 DIAGNOSIS — E66.9 OBESITY, UNSPECIFIED: ICD-10-CM

## 2023-05-22 DIAGNOSIS — F17.200 NICOTINE DEPENDENCE, UNSPECIFIED, UNCOMPLICATED: ICD-10-CM

## 2023-05-22 DIAGNOSIS — E78.5 HYPERLIPIDEMIA, UNSPECIFIED: ICD-10-CM

## 2023-05-22 DIAGNOSIS — I10 ESSENTIAL (PRIMARY) HYPERTENSION: ICD-10-CM

## 2023-05-22 LAB
POCT GLUCOSE: 131 MG/DL (ref 74–99)
POCT GLUCOSE: 135 MG/DL (ref 74–99)

## 2023-07-05 DIAGNOSIS — J44.9 CHRONIC OBSTRUCTIVE PULMONARY DISEASE, UNSPECIFIED COPD TYPE (MULTI): ICD-10-CM

## 2023-07-05 RX ORDER — FLUTICASONE FUROATE, UMECLIDINIUM BROMIDE AND VILANTEROL TRIFENATATE 100; 62.5; 25 UG/1; UG/1; UG/1
POWDER RESPIRATORY (INHALATION)
Qty: 60 EACH | Refills: 1 | Status: SHIPPED | OUTPATIENT
Start: 2023-07-05 | End: 2023-09-27 | Stop reason: SDUPTHER

## 2023-07-28 DIAGNOSIS — E11.69 TYPE 2 DIABETES MELLITUS WITH OTHER SPECIFIED COMPLICATION, WITH LONG-TERM CURRENT USE OF INSULIN (HCC): ICD-10-CM

## 2023-07-28 DIAGNOSIS — Z79.4 TYPE 2 DIABETES MELLITUS WITH OTHER SPECIFIED COMPLICATION, WITH LONG-TERM CURRENT USE OF INSULIN (HCC): ICD-10-CM

## 2023-07-28 PROBLEM — S83.91XA SPRAIN OF RIGHT KNEE: Status: RESOLVED | Noted: 2023-03-29 | Resolved: 2023-07-28

## 2023-07-28 PROBLEM — E78.5 HYPERLIPIDEMIA: Status: RESOLVED | Noted: 2023-03-29 | Resolved: 2023-07-28

## 2023-07-28 PROBLEM — M17.11 OSTEOARTHRITIS OF RIGHT KNEE: Status: RESOLVED | Noted: 2023-03-29 | Resolved: 2023-07-28

## 2023-07-28 PROBLEM — R93.89 IMAGING ABNORMALITIES: Status: RESOLVED | Noted: 2023-03-29 | Resolved: 2023-07-28

## 2023-07-28 PROBLEM — M23.90 INTERNAL DERANGEMENT OF KNEE: Status: RESOLVED | Noted: 2023-03-29 | Resolved: 2023-07-28

## 2023-07-28 PROBLEM — S82.143A TIBIAL PLATEAU FRACTURE: Status: RESOLVED | Noted: 2023-03-29 | Resolved: 2023-07-28

## 2023-07-28 PROBLEM — M25.529 ELBOW PAIN: Status: RESOLVED | Noted: 2023-03-29 | Resolved: 2023-07-28

## 2023-07-28 PROBLEM — J44.9 CHRONIC OBSTRUCTIVE PULMONARY DISEASE (MULTI): Status: ACTIVE | Noted: 2023-04-27

## 2023-07-28 PROBLEM — M43.6 NECK STIFFNESS: Status: RESOLVED | Noted: 2023-03-29 | Resolved: 2023-07-28

## 2023-07-28 PROBLEM — S82.121A CLOSED FRACTURE OF LATERAL PORTION OF RIGHT TIBIAL PLATEAU: Status: RESOLVED | Noted: 2023-03-29 | Resolved: 2023-07-28

## 2023-07-28 PROBLEM — M77.31 CALCANEAL SPUR OF BOTH FEET: Status: RESOLVED | Noted: 2021-07-16 | Resolved: 2023-07-28

## 2023-07-28 PROBLEM — M77.32 CALCANEAL SPUR OF BOTH FEET: Status: RESOLVED | Noted: 2021-07-16 | Resolved: 2023-07-28

## 2023-07-28 PROBLEM — S83.90XA KNEE SPRAIN: Status: RESOLVED | Noted: 2023-03-29 | Resolved: 2023-07-28

## 2023-07-28 RX ORDER — INSULIN LISPRO 100 [IU]/ML
INJECTION, SUSPENSION SUBCUTANEOUS
Qty: 15 ML | Refills: 5 | Status: SHIPPED | OUTPATIENT
Start: 2023-07-28

## 2023-08-14 ENCOUNTER — OFFICE VISIT (OUTPATIENT)
Dept: PRIMARY CARE | Facility: CLINIC | Age: 63
End: 2023-08-14
Payer: MEDICARE

## 2023-08-14 VITALS
BODY MASS INDEX: 37.99 KG/M2 | WEIGHT: 228 LBS | HEIGHT: 65 IN | HEART RATE: 88 BPM | RESPIRATION RATE: 16 BRPM | TEMPERATURE: 97.5 F | SYSTOLIC BLOOD PRESSURE: 110 MMHG | DIASTOLIC BLOOD PRESSURE: 70 MMHG | OXYGEN SATURATION: 98 %

## 2023-08-14 DIAGNOSIS — I10 ESSENTIAL HYPERTENSION: ICD-10-CM

## 2023-08-14 DIAGNOSIS — M19.90 GENERALIZED ARTHRITIS: ICD-10-CM

## 2023-08-14 DIAGNOSIS — R22.0 MASS OF FACE: ICD-10-CM

## 2023-08-14 DIAGNOSIS — Z00.00 ROUTINE GENERAL MEDICAL EXAMINATION AT HEALTH CARE FACILITY: Primary | ICD-10-CM

## 2023-08-14 DIAGNOSIS — M47.812 OSTEOARTHRITIS OF CERVICAL SPINE, UNSPECIFIED SPINAL OSTEOARTHRITIS COMPLICATION STATUS: ICD-10-CM

## 2023-08-14 DIAGNOSIS — Z79.4 TYPE 2 DIABETES MELLITUS WITH HYPERGLYCEMIA, WITH LONG-TERM CURRENT USE OF INSULIN (MULTI): ICD-10-CM

## 2023-08-14 DIAGNOSIS — E11.65 TYPE 2 DIABETES MELLITUS WITH HYPERGLYCEMIA, WITH LONG-TERM CURRENT USE OF INSULIN (MULTI): ICD-10-CM

## 2023-08-14 DIAGNOSIS — Z12.11 COLON CANCER SCREENING: ICD-10-CM

## 2023-08-14 DIAGNOSIS — E78.2 MIXED HYPERLIPIDEMIA: ICD-10-CM

## 2023-08-14 DIAGNOSIS — R22.32 ELBOW MASS, LEFT: ICD-10-CM

## 2023-08-14 LAB — POC HEMOGLOBIN A1C: 7.2 % (ref 4.2–6.5)

## 2023-08-14 PROCEDURE — 83036 HEMOGLOBIN GLYCOSYLATED A1C: CPT | Performed by: FAMILY MEDICINE

## 2023-08-14 PROCEDURE — 3078F DIAST BP <80 MM HG: CPT | Performed by: FAMILY MEDICINE

## 2023-08-14 PROCEDURE — 3008F BODY MASS INDEX DOCD: CPT | Performed by: FAMILY MEDICINE

## 2023-08-14 PROCEDURE — 99213 OFFICE O/P EST LOW 20 MIN: CPT | Performed by: FAMILY MEDICINE

## 2023-08-14 PROCEDURE — 3074F SYST BP LT 130 MM HG: CPT | Performed by: FAMILY MEDICINE

## 2023-08-14 PROCEDURE — G0439 PPPS, SUBSEQ VISIT: HCPCS | Performed by: FAMILY MEDICINE

## 2023-08-14 ASSESSMENT — ENCOUNTER SYMPTOMS
OCCASIONAL FEELINGS OF UNSTEADINESS: 0
DEPRESSION: 0
LOSS OF SENSATION IN FEET: 0

## 2023-08-14 ASSESSMENT — ACTIVITIES OF DAILY LIVING (ADL)
DRESSING: INDEPENDENT
GROCERY_SHOPPING: INDEPENDENT
DOING_HOUSEWORK: INDEPENDENT
BATHING: INDEPENDENT
MANAGING_FINANCES: INDEPENDENT
TAKING_MEDICATION: INDEPENDENT

## 2023-08-14 ASSESSMENT — PATIENT HEALTH QUESTIONNAIRE - PHQ9
2. FEELING DOWN, DEPRESSED OR HOPELESS: NOT AT ALL
SUM OF ALL RESPONSES TO PHQ9 QUESTIONS 1 AND 2: 0
1. LITTLE INTEREST OR PLEASURE IN DOING THINGS: NOT AT ALL

## 2023-08-14 NOTE — PROGRESS NOTES
Subjective   Reason for Visit: Cortney Ruby is a 62 y.o. female here for a Medicare Wellness visit.   C19: [id number]  Pap: Pt DECLINED  Mamm: Pt DECLINED  ColoRect: due - adenoma  CHECKLIST REVIEWED AND COMPLETE FOR AMW  Ldl 50    Past Medical, Surgical, and Family History reviewed and updated in chart.    Reviewed all medications by prescribing practitioner or clinical pharmacist (such as prescriptions, OTCs, herbal therapies and supplements) and documented in the medical record.  Medicare Annual Wellness Visit Subsequent, Mass (Lump on left elbow and left side of face), and Cough (X2weeks did not take a Covid test)  HPI    Patient Self Assessment of Health Status  Patient Self Assessment: Good    Nutrition and Exercise  Current Diet: HEALTHY Diet always best, minimizing excess carbs  Adequate Fluid Intake: Yes  Caffeine: -aware to minimize intake  Exercise Frequency: Regularly advised    Functional Ability/Level of Safety  Cognitive Impairment Observed: No cognitive impairment observed    Home Safety Risk Factors: None    Patient Care Team:  Mayra Lewis MD as PCP - General    HPI  Patient Active Problem List   Diagnosis    Actinic keratosis    Bilateral calcaneal spurs    Carpal tunnel syndrome    Cubital tunnel syndrome on left    Current smoker    Cervical radiculopathy    DJD (degenerative joint disease), cervical    Essential hypertension    First degree AV block    Gall stones    Generalized arthritis    Hematuria of unknown etiology    Mixed hyperlipidemia    Pericardial cyst    Prolonged QT interval    Class 2 obesity with body mass index (BMI) of 39.0 to 39.9 in adult    Right knee DJD    Type 2 diabetes mellitus with hyperglycemia, with long-term current use of insulin (CMS/HCC)    Chronic obstructive pulmonary disease (CMS/HCC)    Tubular adenoma of colon    Colon cancer screening      Past Surgical History:   Procedure Laterality Date    CERVICAL FUSION       SECTION, CLASSIC       "1977, 1978    COLONOSCOPY  08/2018    adenoma-due 2023    CYST REMOVAL  2017    Left Cheek & Right Earlobe    CYSTOSCOPY  2015    Dr. Sanchez-unsure date    LUMBAR DISCECTOMY  2015    Dr. Clay    US ASPIRATION INJECTION INTERMEDIATE JOINT  01/18/2021    US ASPIRATION INJECTION INTERMEDIATE JOINT 1/18/2021 [facility]    XR CHEST PACEMAKER WITH FLUORO  05/22/2023    XR CHEST PACEMAKER WITH FLUORO 5/22/2023 [facility]       Review of Systems  This patient has   NO history of recent Covid nor flu symptoms,  NO Fever nor chills,  NO Chest pain, shortness of breath nor paroxysmal nocturnal dyspnea,  NO Nausea, vomiting, nor diarrhea,  NO Hematochezia nor melena,  NO Dysuria, hematuria, nor new incontinence issues  NO new severe headaches nor neurological complaints,  NO new issues with anxiety nor depression nor new psychiatric complaints,  NO suicidal nor homicidal ideations.     OBJECTIVE:  /70 (BP Location: Left arm, Patient Position: Sitting, BP Cuff Size: Adult)   Pulse 88   Temp 36.4 °C (97.5 °F) (Temporal)   Resp 16   Ht 1.651 m (5' 5\")   Wt 103 kg (228 lb)   SpO2 98%   BMI 37.94 kg/m²      General:  alert, oriented, no acute distress.  No obvious skin rashes noted.   No gait disturbance noted.    Mood is pleasant, not tearful, no signs of emotional distress.  Not appearing intoxicated or altered.   No voiced delusions,   Normal, appropriate behavior.    HEENT: Normocephalic, atraumatic,   Pupils round, reactive to light  Extraocular motions intact and wnl  Tympanic membranes normal    Neck: no nuchal rigidity  No masses palpable.  No carotid bruits.  No thyromegaly.    Respiratory: Equal breath sounds  No wheezes,    rales,    nor rhonchi  No respiratory distress.    Heart: Regular rate and rhythm, no    murmurs  no rubs/gallops    Abdomen: no masses palpable, no rebound nor guarding, no rebound nor guarding.    Extremities: NO cyanosis noted, no clubbing.   No edema " noted.  2+dorsalis pedis pulses.    Normal-not antalgic, steady gait.    Office Visit on 08/14/2023   Component Date Value Ref Range Status    POC HEMOGLOBIN A1c 08/14/2023 7.2 (A)  4.2 - 6.5 % Final        Assessment/Plan     Problem List Items Addressed This Visit       DJD (degenerative joint disease), cervical    Essential hypertension    Generalized arthritis    Mixed hyperlipidemia    Type 2 diabetes mellitus with hyperglycemia, with long-term current use of insulin (CMS/[facility])    Relevant Orders    POCT glycosylated hemoglobin (Hb A1C) manually resulted (Completed)    Colon cancer screening    Relevant Orders    Colonoscopy     Other Visit Diagnoses       Routine general medical examination at health care facility    -  Primary          Advance Care Planning Note   Discussion Date: 08/14/23   Discussion Participants: patient    The patient wishes to discuss Advance Care Planning today and the following is a brief summary of our discussion.     Patient has capacity to make their own medical decisions: Yes  Health Care Agent/Surrogate Decision Maker documented in chart: Yes  Documents on file and valid:  Advance Directive/Living Will no  Health Care Power of  no  Communication of Medical Status/Prognosis:   yes   Communication of Treatment Goals/Options:   yes  Treatment Decisions  yes  Time Statement: Total face to face time spent on advance care planning was <30 minutes with <30 minutes spent in counseling, including the explanation.    SEE ME AT NEXT REGULARLY SCHEDULED VISIT-sooner if condition deteriorates or new problems arise.    And again had a lengthy discussion w pt  about risks of poorly controlled diabetes including micro and macrovascular complications of DM2 including blindness,MI,CVA and death among other possibilities. Pt aware and agrees to better compliance and adherance to instructions such as regular eye exams q 1-2 y, foot exams,and f/u regularly for hba1c with a goal of 6.5.    NO  evidence of neuropathy or nephropathy at this point    Follow up as planned for hba1c and BP checks REGULARLY.  FULL NO CODE per pt  Hba1c 7.2  Sees dr johnson  Dc smoking advised  Ct lung screen-DECLINED  Mamm DECLINES  Sees dr wiley  Caregiving for mom  Sometimes stressful  No hi/si-not overwhelmed today    See me 6mo  Btw 1-2cm mobile mass L cheek and L elbow 1-2cm lesion-to plastics

## 2023-09-06 DIAGNOSIS — J44.9 CHRONIC OBSTRUCTIVE PULMONARY DISEASE, UNSPECIFIED COPD TYPE (MULTI): ICD-10-CM

## 2023-09-06 DIAGNOSIS — F17.200 CURRENT SMOKER: ICD-10-CM

## 2023-09-06 RX ORDER — IPRATROPIUM BROMIDE AND ALBUTEROL 20; 100 UG/1; UG/1
1 SPRAY, METERED RESPIRATORY (INHALATION)
Qty: 12 G | Refills: 3 | Status: SHIPPED | OUTPATIENT
Start: 2023-09-06

## 2023-09-27 DIAGNOSIS — J44.9 CHRONIC OBSTRUCTIVE PULMONARY DISEASE, UNSPECIFIED COPD TYPE (MULTI): ICD-10-CM

## 2023-09-27 RX ORDER — FLUTICASONE FUROATE, UMECLIDINIUM BROMIDE AND VILANTEROL TRIFENATATE 100; 62.5; 25 UG/1; UG/1; UG/1
1 POWDER RESPIRATORY (INHALATION) DAILY
Qty: 60 EACH | Refills: 3 | Status: SHIPPED | OUTPATIENT
Start: 2023-09-27 | End: 2024-02-12 | Stop reason: SDUPTHER

## 2023-10-02 NOTE — OP NOTE
Post Operative Note:     Post-Procedure Diagnosis: Cervical spine stenosis  Jayda   Procedure: 1.   2.   3.   4.   5.   Surgeon: Jayda   Resident/Fellow/Other Assistant: Fidencio   Estimated Blood Loss (mL): Less than 50 cc   Specimen: no   Findings: Cervical spine stenosis     Operative Report Dictated:  Dictation: not applicable - note contains Operative  Report   Note Recipients: Regulo Montelongo MD - [id number]  [Preferred]  Mayra Lewis MD   Operative Report:    Preoperative diagnosis: C5-6 stenosis    Postop diagnosis: Above    Procedure:1) C5-6 anterior cervical decompression and fusion decompressing centrally and bilateral foraminally.                   2) C5-6 instrumentation.                   3) C5-6 use of biologic bone graft.                   4) use of operative microscope.    Surgeon: Regulo Montelongo M.D.    Asst.: Kwadwo DURANThe physician assistant was present through the entire case.  Given the nature of the disease process and the procedure to be performed a skilled surgical assistant was necessary during the case.  The assistant was necessary in  order to hold retractors and directly assist in the operation.  A certified scrub tech was at the back table managing instruments and supplies for the surgical case.    Anesthesia: General    HPI: The patient had neck and arm symptoms from the above described condition.  The patient failed all nonoperative treatment.  All the risks, benefits, and potential complications for operative and nonoperative treatment were discussed at length with  the the patient.  Risks of operative intervention include, but are not limited to: Anesthesia complications, excessive blood loss, infection, damaged uninjured structures including, but not limited to: The dural sac, nerve roots, spinal cord, vertebral  artery, trachea, esophagus, and carotid artery, blood clots and lack of improvement or worsening of symptoms.  These complications could  result in death, permanent disability including paralysis, swallowing or speech difficulty, or need for reoperation.   The patient completely understood these risks and wished to proceed with operative intervention.    Operative implants: Medtronic Zevo plate and screws. Medtronic Titan cage.  Trifecta bone graft.    Operative procedure: The patient was wheeled from the preanesthesia care unit to the operating theater.  The patient was placed in supine position and all bony prominences were well-padded.  For the entire procedure anesthesia maintainined control of  the patient's head neck.  General anesthesia was induced.  A shoulder roll was placed between the patient's shoulder blades.  The arms were well-padded and tucked to the patient's side.  Tape was used to provide longitudinal traction over the foot of  the bed.  The head was placed in neutral rotation and an amount of extension that was determined in the preoperative hold area that was comfortable for the patient, and was taped in this position.  Next, a needle was taped to the outside of the patient's  neck and x-rays were taken to confirm the appropriate skin incision level.  The neck was then marked and prepped and draped in a normal sterile fashion.    Timeout was performed, site verification marking was verified, and appropriate antibiotic demonstration was confirmed.  Next an incision over the C5-6 level was performed.  Dissection was carried down to the skin with a knife and Bovie was used to dissect  down through the platysma.  Pickups and scissors were used to dissect down to expose the interval between the trachea, esophagus and strap muscles in the midline and the carotid sheath and sternocleidomastoid laterally.  Appendiceal retractors were used  and at this point the prevertebral fascia overlying the spine was visualized.  Peanuts were used to spread the fascia exposing the vertebral bodies and disc and longus coli muscle.  A bent spinal needle  was inserted into the C5-6 level.  X-rays were taken  confirm the appropriate level.    Next, using appendiceal hand-held retractors the Bovie was used to lift the soft tissues and longus coli out to the level of the uncovertebral joints bilaterally from the caudal portion of C5 to the cephalad portion of C6.  The Lema retractor was  then placed under the longus coli muscle on bone centered over the C5-6 level.  The microscope was then brought in for use.    Cloward retractors were used to protect cephalad and caudal in the blades from the Lema retractor were protecting medially and laterally.  A 5 mm round bur was then used to take down the anterior osteophytes.  A knife was used to box cut the disc  and a pituitary rongeur was used to remove the annulus.  A pituitary rongeur was then used to remove the anterior two thirds of the disc.  A [place] pin retractor was then placed to provide distraction of the disc space.  O and #2 curettes were then used  to take the disc completely off of the cephalad and caudal endplates laterally out to the uncovertebral joints.  Curved 6-0 curette was then used to reach behind the cephalad and caudal vertebral bodies.  First starting on the patient's right side #2  [place] was used to perform a foraminotomy decompressing the foramen and removing posterior osteophytes behind the cephalad and caudal vertebral bodies.  This decompression was taken across to the contralateral side taking down all posterior osteophytes  decompressing centrally and performing a foraminotomy on the contralateral side as well.  The central decompression was taken down to almost the full thickness of the posterior longitudinal ligament.  However, the ligament was left intact.  At this point  there was complete decompression centrally and bilateral foraminally.  A matchstick bur was used to square the endplates without violating the full-thickness of the endplates.  The wound was irrigated copiously.   FloSeal was used for the entire case to  maintain hemostasis but no FloSeal was left in the patient.  At this point there was no bleeding.    Trialing was performed and a size 7 small cage was found to be appropriate the cages were filled with appropriately prepared bone graft and inserted under tension and tension was removed.  X-rays were taken to confirm appropriate cage positioning.  A  plate was then affixed anteriorly and all holes in the plate were hand drilled and filled.  The screws were locked into position using the locking device on the plate.  X-rays were taken and confirmed appropriate cage positioning as well as plate and  screw positioning.    At this point there was no bleeding and the wound was irrigated with copious amounts of normal saline.  The platysma was closed with running 2-0 Vicryl.  The skin was closed with 3-0 Vicryl and 4-0 Monocryl and Steri-Strips.  A sterile dressing was applied.   The patient was placed into a cervical collar.  Spinal cord monitoring was used throughout the entire case and there was no evidence of any spinal cord monitoring abnormal changes.  At the conclusion of the case the patient's fingers and toes were pink  and warm with brisk capillary refill.  The patient tolerated the procedure well.        This dictation was created using voice recognition software.  If there are any questions about inaccuracies please do not hesitate to contact me.      Electronic Signatures:  Regulo Montelongo)  (Signed 22-May-2023 14:18)   Authored: Post Operative Note, Note Completion      Last Updated: 22-May-2023 14:18 by Regulo Montelongo)

## 2023-10-05 DIAGNOSIS — I10 ESSENTIAL HYPERTENSION: ICD-10-CM

## 2023-10-05 RX ORDER — AMLODIPINE BESYLATE 2.5 MG/1
2.5 TABLET ORAL DAILY
Qty: 90 TABLET | Refills: 3 | Status: SHIPPED | OUTPATIENT
Start: 2023-10-05

## 2023-10-23 PROBLEM — R22.0 CHEEK MASS: Status: ACTIVE | Noted: 2023-10-23

## 2023-10-23 PROBLEM — M79.89 MASS OF SOFT TISSUE OF ELBOW: Status: ACTIVE | Noted: 2023-10-23

## 2023-10-24 ENCOUNTER — PREP FOR PROCEDURE (OUTPATIENT)
Dept: PREADMISSION TESTING | Facility: HOSPITAL | Age: 63
End: 2023-10-24
Payer: MEDICARE

## 2023-10-24 DIAGNOSIS — Z01.818 PRE-OPERATIVE CLEARANCE: ICD-10-CM

## 2023-10-24 DIAGNOSIS — R22.0 MASS OF FACE: ICD-10-CM

## 2023-10-24 DIAGNOSIS — M79.89 SOFT TISSUE MASS: ICD-10-CM

## 2023-10-24 RX ORDER — CEFAZOLIN SODIUM 2 G/50ML
2 SOLUTION INTRAVENOUS ONCE
Status: CANCELLED | OUTPATIENT
Start: 2023-10-31

## 2023-10-24 RX ORDER — SODIUM CHLORIDE, SODIUM LACTATE, POTASSIUM CHLORIDE, CALCIUM CHLORIDE 600; 310; 30; 20 MG/100ML; MG/100ML; MG/100ML; MG/100ML
100 INJECTION, SOLUTION INTRAVENOUS CONTINUOUS
Status: CANCELLED | OUTPATIENT
Start: 2023-10-31

## 2023-10-25 ENCOUNTER — PREP FOR PROCEDURE (OUTPATIENT)
Dept: PREADMISSION TESTING | Facility: HOSPITAL | Age: 63
End: 2023-10-25

## 2023-10-25 ENCOUNTER — OFFICE VISIT (OUTPATIENT)
Dept: PLASTIC SURGERY | Facility: CLINIC | Age: 63
End: 2023-10-25
Payer: MEDICARE

## 2023-10-25 VITALS
BODY MASS INDEX: 37.99 KG/M2 | DIASTOLIC BLOOD PRESSURE: 86 MMHG | WEIGHT: 228 LBS | HEIGHT: 65 IN | SYSTOLIC BLOOD PRESSURE: 142 MMHG

## 2023-10-25 DIAGNOSIS — R22.0 CHEEK MASS: Primary | ICD-10-CM

## 2023-10-25 DIAGNOSIS — M79.89 MASS OF SOFT TISSUE OF ELBOW: ICD-10-CM

## 2023-10-25 PROCEDURE — 4004F PT TOBACCO SCREEN RCVD TLK: CPT | Performed by: PLASTIC SURGERY

## 2023-10-25 PROCEDURE — 3077F SYST BP >= 140 MM HG: CPT | Performed by: PLASTIC SURGERY

## 2023-10-25 PROCEDURE — 99212 OFFICE O/P EST SF 10 MIN: CPT | Performed by: PLASTIC SURGERY

## 2023-10-25 PROCEDURE — 3079F DIAST BP 80-89 MM HG: CPT | Performed by: PLASTIC SURGERY

## 2023-10-25 PROCEDURE — 3008F BODY MASS INDEX DOCD: CPT | Performed by: PLASTIC SURGERY

## 2023-10-25 ASSESSMENT — PAIN SCALES - GENERAL: PAINLEVEL: 0-NO PAIN

## 2023-10-25 NOTE — PROGRESS NOTES
"Reason for Visit: H&P    Assessment and Plan:  Problem List Items Addressed This Visit    None    Mass left cheek and left elbow    HPI:  62-year-old female with enlarging mass of the left cheek and left elbow patient now to undergo excision under MAC anesthesia    ROS otherwise negative aside from what was mentioned above in HPI.    Vitals  /86 (BP Location: Left arm, Patient Position: Sitting)   Ht 1.651 m (5' 5\")   Wt 103 kg (228 lb)   BMI 37.94 kg/m²   Body mass index is 37.94 kg/m².  Physical Exam  Gen: Alert, NAD  HEENT:  Normal exam; left cheek with a 2 cm mass  Neck:  No masses/nodes palpable; Thyroid nontender and without nodules; No ESPERANZA  Respiratory:  Lungs CTAB  CV: RRR  Neuro:  Gross motor and sensory intact  Skin:  No suspicious lesions present  Breast: No masses or axillary lymphadenopathy  Extremities full range of motion; left elbow with a 2 cm mass    Active Problem List  Patient Active Problem List   Diagnosis    Actinic keratosis    Bilateral calcaneal spurs    Carpal tunnel syndrome    Cubital tunnel syndrome on left    Current smoker    Cervical radiculopathy    DJD (degenerative joint disease), cervical    Essential hypertension    First degree AV block    Gall stones    Generalized arthritis    Hematuria of unknown etiology    Mixed hyperlipidemia    Pericardial cyst    Prolonged QT interval    Class 2 obesity with body mass index (BMI) of 39.0 to 39.9 in adult    Right knee DJD    Type 2 diabetes mellitus with hyperglycemia, with long-term current use of insulin (CMS/HCC)    Chronic obstructive pulmonary disease (CMS/HCC)    Tubular adenoma of colon    Colon cancer screening    Cheek mass    Mass of soft tissue of elbow       Comprehensive Medical/Surgical/Social/Family History  Past Medical History:   Diagnosis Date    Closed fracture of lateral portion of right tibial plateau     History of mammogram 07/2016    cat 2    Internal derangement of knee     Mammogram declined     Pap " "smear of cervix declined     Pap test, as part of routine gynecological examination 2015    wnl, hpv not done    Peripheral vascular disease of foot (CMS/HCC)     Respiratory failure (CMS/HCC)      Past Surgical History:   Procedure Laterality Date    CERVICAL FUSION       SECTION, CLASSIC  1977    ,     COLONOSCOPY  2018    adenoma-due     CYST REMOVAL      Left Cheek & Right Earlobe    CYSTOSCOPY      Dr. Sanchez-unsure date    LUMBAR DISCECTOMY      Dr. Clay    US ASPIRATION INJECTION INTERMEDIATE JOINT  2021    US ASPIRATION INJECTION INTERMEDIATE JOINT 2021 [facility]    XR CHEST PACEMAKER WITH FLUORO  2023    XR CHEST PACEMAKER WITH FLUORO 2023 [facility]     Social History     Social History Narrative    Not on file       Allergies and Medications  Norco [hydrocodone-acetaminophen]  Current Outpatient Medications   Medication Instructions    amLODIPine (NORVASC) 2.5 mg, oral, Daily    atorvastatin (Lipitor) 40 mg tablet 1 tablet, oral, Daily    BD Ultra-Fine Micro Pen Needle 32 gauge x 1/4\" needle USE TWICE DAILY    blood sugar diagnostic strip TEST THREE TIMES DAILY AS NEEDED    Combivent Respimat  mcg/actuation inhaler 1 puff, inhalation, 4 times daily RT    fluticasone-umeclidin-vilanter (Trelegy Ellipta) 100-62.5-25 mcg blister with device 1 puff, inhalation, Daily    HumaLOG Mix 75-25 KwikPen 100 unit/mL (75-25) injection INJECT 50 UNIT Twice daily    ibuprofen 800 mg tablet 1 tablet, oral, Every 8 hours PRN    OneTouch Ultra Test strip TEST BID AND PRN   Impression: Left cheek mass and left elbow mass  Recommendation: Excision left cheek and left elbow mass under MAC anesthesia    We have reviewed the consent form, paragraph by paragraph regarding the possible complications.  Patient appears to understand and wishes to proceed and has provided both written and  verbal consent in agreement.    "

## 2023-10-26 ENCOUNTER — HOSPITAL ENCOUNTER (OUTPATIENT)
Dept: CARDIOLOGY | Facility: HOSPITAL | Age: 63
Discharge: HOME | End: 2023-10-26
Payer: MEDICARE

## 2023-10-26 DIAGNOSIS — E11.69 TYPE 2 DIABETES MELLITUS WITH OTHER SPECIFIED COMPLICATION, WITH LONG-TERM CURRENT USE OF INSULIN (HCC): ICD-10-CM

## 2023-10-26 DIAGNOSIS — Z01.818 PRE-OPERATIVE CLEARANCE: ICD-10-CM

## 2023-10-26 DIAGNOSIS — Z79.4 TYPE 2 DIABETES MELLITUS WITH OTHER SPECIFIED COMPLICATION, WITH LONG-TERM CURRENT USE OF INSULIN (HCC): ICD-10-CM

## 2023-10-26 DIAGNOSIS — R22.0 MASS OF FACE: ICD-10-CM

## 2023-10-26 DIAGNOSIS — M79.89 SOFT TISSUE MASS: ICD-10-CM

## 2023-10-26 LAB
ATRIAL RATE: 72 BPM
P AXIS: 54 DEGREES
P OFFSET: 172 MS
P ONSET: 112 MS
PR INTERVAL: 210 MS
Q ONSET: 217 MS
QRS COUNT: 12 BEATS
QRS DURATION: 92 MS
QT INTERVAL: 418 MS
QTC CALCULATION(BAZETT): 457 MS
QTC FREDERICIA: 444 MS
R AXIS: 20 DEGREES
T AXIS: 53 DEGREES
T OFFSET: 426 MS
VENTRICULAR RATE: 72 BPM

## 2023-10-26 PROCEDURE — 93005 ELECTROCARDIOGRAM TRACING: CPT

## 2023-10-26 PROCEDURE — 93010 ELECTROCARDIOGRAM REPORT: CPT | Performed by: INTERNAL MEDICINE

## 2023-10-26 RX ORDER — INSULIN LISPRO 100 [IU]/ML
INJECTION, SUSPENSION SUBCUTANEOUS
Qty: 15 ML | Refills: 5 | Status: SHIPPED | OUTPATIENT
Start: 2023-10-26

## 2023-10-31 ENCOUNTER — HOSPITAL ENCOUNTER (OUTPATIENT)
Facility: HOSPITAL | Age: 63
Setting detail: OUTPATIENT SURGERY
Discharge: HOME | End: 2023-10-31
Attending: PLASTIC SURGERY | Admitting: PLASTIC SURGERY
Payer: MEDICARE

## 2023-10-31 ENCOUNTER — ANESTHESIA EVENT (OUTPATIENT)
Dept: OPERATING ROOM | Facility: HOSPITAL | Age: 63
End: 2023-10-31
Payer: MEDICARE

## 2023-10-31 ENCOUNTER — ANESTHESIA (OUTPATIENT)
Dept: OPERATING ROOM | Facility: HOSPITAL | Age: 63
End: 2023-10-31
Payer: MEDICARE

## 2023-10-31 VITALS
RESPIRATION RATE: 18 BRPM | WEIGHT: 226.41 LBS | OXYGEN SATURATION: 96 % | DIASTOLIC BLOOD PRESSURE: 71 MMHG | SYSTOLIC BLOOD PRESSURE: 131 MMHG | BODY MASS INDEX: 37.72 KG/M2 | HEIGHT: 65 IN | HEART RATE: 66 BPM | TEMPERATURE: 97.2 F

## 2023-10-31 DIAGNOSIS — M79.89 SOFT TISSUE MASS: ICD-10-CM

## 2023-10-31 DIAGNOSIS — M79.89 MASS OF SOFT TISSUE OF ELBOW: Primary | ICD-10-CM

## 2023-10-31 DIAGNOSIS — R22.0 CHEEK MASS: ICD-10-CM

## 2023-10-31 DIAGNOSIS — R22.0 MASS OF FACE: ICD-10-CM

## 2023-10-31 LAB — GLUCOSE BLD MANUAL STRIP-MCNC: 128 MG/DL (ref 74–99)

## 2023-10-31 PROCEDURE — 2500000004 HC RX 250 GENERAL PHARMACY W/ HCPCS (ALT 636 FOR OP/ED): Performed by: PLASTIC SURGERY

## 2023-10-31 PROCEDURE — 7100000010 HC PHASE TWO TIME - EACH INCREMENTAL 1 MINUTE: Performed by: PLASTIC SURGERY

## 2023-10-31 PROCEDURE — 82947 ASSAY GLUCOSE BLOOD QUANT: CPT

## 2023-10-31 PROCEDURE — 12052 INTMD RPR FACE/MM 2.6-5.0 CM: CPT | Performed by: PHYSICIAN ASSISTANT

## 2023-10-31 PROCEDURE — 12032 INTMD RPR S/A/T/EXT 2.6-7.5: CPT | Performed by: PLASTIC SURGERY

## 2023-10-31 PROCEDURE — 11442 EXC FACE-MM B9+MARG 1.1-2 CM: CPT | Performed by: PLASTIC SURGERY

## 2023-10-31 PROCEDURE — 12052 INTMD RPR FACE/MM 2.6-5.0 CM: CPT | Performed by: PLASTIC SURGERY

## 2023-10-31 PROCEDURE — 7100000009 HC PHASE TWO TIME - INITIAL BASE CHARGE: Performed by: PLASTIC SURGERY

## 2023-10-31 PROCEDURE — 3600000003 HC OR TIME - INITIAL BASE CHARGE - PROCEDURE LEVEL THREE: Performed by: PLASTIC SURGERY

## 2023-10-31 PROCEDURE — 2500000005 HC RX 250 GENERAL PHARMACY W/O HCPCS: Performed by: PLASTIC SURGERY

## 2023-10-31 PROCEDURE — 11403 EXC TR-EXT B9+MARG 2.1-3CM: CPT | Performed by: PHYSICIAN ASSISTANT

## 2023-10-31 PROCEDURE — 2720000007 HC OR 272 NO HCPCS: Performed by: PLASTIC SURGERY

## 2023-10-31 PROCEDURE — 2500000004 HC RX 250 GENERAL PHARMACY W/ HCPCS (ALT 636 FOR OP/ED): Performed by: ANESTHESIOLOGY

## 2023-10-31 PROCEDURE — 3700000001 HC GENERAL ANESTHESIA TIME - INITIAL BASE CHARGE: Performed by: PLASTIC SURGERY

## 2023-10-31 PROCEDURE — 88304 TISSUE EXAM BY PATHOLOGIST: CPT | Mod: TC | Performed by: PLASTIC SURGERY

## 2023-10-31 PROCEDURE — 88304 TISSUE EXAM BY PATHOLOGIST: CPT | Performed by: PATHOLOGY

## 2023-10-31 PROCEDURE — 3700000002 HC GENERAL ANESTHESIA TIME - EACH INCREMENTAL 1 MINUTE: Performed by: PLASTIC SURGERY

## 2023-10-31 PROCEDURE — 11403 EXC TR-EXT B9+MARG 2.1-3CM: CPT | Performed by: PLASTIC SURGERY

## 2023-10-31 PROCEDURE — 2500000001 HC RX 250 WO HCPCS SELF ADMINISTERED DRUGS (ALT 637 FOR MEDICARE OP): Performed by: PLASTIC SURGERY

## 2023-10-31 PROCEDURE — 3600000008 HC OR TIME - EACH INCREMENTAL 1 MINUTE - PROCEDURE LEVEL THREE: Performed by: PLASTIC SURGERY

## 2023-10-31 PROCEDURE — 12032 INTMD RPR S/A/T/EXT 2.6-7.5: CPT | Performed by: PHYSICIAN ASSISTANT

## 2023-10-31 PROCEDURE — A4217 STERILE WATER/SALINE, 500 ML: HCPCS | Performed by: PLASTIC SURGERY

## 2023-10-31 PROCEDURE — 11442 EXC FACE-MM B9+MARG 1.1-2 CM: CPT | Performed by: PHYSICIAN ASSISTANT

## 2023-10-31 RX ORDER — FENTANYL CITRATE 50 UG/ML
INJECTION, SOLUTION INTRAMUSCULAR; INTRAVENOUS AS NEEDED
Status: DISCONTINUED | OUTPATIENT
Start: 2023-10-31 | End: 2023-11-01

## 2023-10-31 RX ORDER — IBUPROFEN 800 MG/1
800 TABLET ORAL EVERY 8 HOURS PRN
Status: SHIPPED | OUTPATIENT
Start: 2023-10-31 | End: 2023-11-05

## 2023-10-31 RX ORDER — OXYCODONE HYDROCHLORIDE 5 MG/1
5 TABLET ORAL EVERY 4 HOURS PRN
Status: CANCELLED | OUTPATIENT
Start: 2023-10-31

## 2023-10-31 RX ORDER — LIDOCAINE HYDROCHLORIDE AND EPINEPHRINE 20; 10 MG/ML; UG/ML
INJECTION, SOLUTION INFILTRATION; PERINEURAL AS NEEDED
Status: DISCONTINUED | OUTPATIENT
Start: 2023-10-31 | End: 2023-10-31 | Stop reason: HOSPADM

## 2023-10-31 RX ORDER — MIDAZOLAM HYDROCHLORIDE 1 MG/ML
1 INJECTION, SOLUTION INTRAMUSCULAR; INTRAVENOUS ONCE AS NEEDED
Status: CANCELLED | OUTPATIENT
Start: 2023-10-31

## 2023-10-31 RX ORDER — MEPERIDINE HYDROCHLORIDE 25 MG/ML
12.5 INJECTION INTRAMUSCULAR; INTRAVENOUS; SUBCUTANEOUS EVERY 10 MIN PRN
Status: CANCELLED | OUTPATIENT
Start: 2023-10-31

## 2023-10-31 RX ORDER — GENTAMICIN SULFATE 1 MG/G
OINTMENT TOPICAL AS NEEDED
Status: DISCONTINUED | OUTPATIENT
Start: 2023-10-31 | End: 2023-10-31 | Stop reason: HOSPADM

## 2023-10-31 RX ORDER — PROPOFOL 10 MG/ML
INJECTION, EMULSION INTRAVENOUS CONTINUOUS PRN
Status: DISCONTINUED | OUTPATIENT
Start: 2023-10-31 | End: 2023-11-01

## 2023-10-31 RX ORDER — ALBUTEROL SULFATE 0.83 MG/ML
2.5 SOLUTION RESPIRATORY (INHALATION) ONCE
Status: CANCELLED | OUTPATIENT
Start: 2023-10-31 | End: 2023-10-31

## 2023-10-31 RX ORDER — KETOROLAC TROMETHAMINE 30 MG/ML
INJECTION, SOLUTION INTRAMUSCULAR; INTRAVENOUS AS NEEDED
Status: DISCONTINUED | OUTPATIENT
Start: 2023-10-31 | End: 2023-11-01

## 2023-10-31 RX ORDER — SODIUM CHLORIDE 0.9 G/100ML
IRRIGANT IRRIGATION AS NEEDED
Status: DISCONTINUED | OUTPATIENT
Start: 2023-10-31 | End: 2023-10-31 | Stop reason: HOSPADM

## 2023-10-31 RX ORDER — CEFAZOLIN SODIUM 2 G/100ML
2 INJECTION, SOLUTION INTRAVENOUS ONCE
Status: COMPLETED | OUTPATIENT
Start: 2023-10-31 | End: 2023-10-31

## 2023-10-31 RX ORDER — LIDOCAINE HYDROCHLORIDE 10 MG/ML
0.1 INJECTION, SOLUTION EPIDURAL; INFILTRATION; INTRACAUDAL; PERINEURAL ONCE
Status: CANCELLED | OUTPATIENT
Start: 2023-10-31 | End: 2023-10-31

## 2023-10-31 RX ORDER — DROPERIDOL 2.5 MG/ML
0.62 INJECTION, SOLUTION INTRAMUSCULAR; INTRAVENOUS ONCE AS NEEDED
Status: CANCELLED | OUTPATIENT
Start: 2023-10-31

## 2023-10-31 RX ORDER — SODIUM CHLORIDE, SODIUM LACTATE, POTASSIUM CHLORIDE, CALCIUM CHLORIDE 600; 310; 30; 20 MG/100ML; MG/100ML; MG/100ML; MG/100ML
100 INJECTION, SOLUTION INTRAVENOUS CONTINUOUS
Status: CANCELLED | OUTPATIENT
Start: 2023-10-31

## 2023-10-31 RX ORDER — SODIUM CHLORIDE, SODIUM LACTATE, POTASSIUM CHLORIDE, CALCIUM CHLORIDE 600; 310; 30; 20 MG/100ML; MG/100ML; MG/100ML; MG/100ML
100 INJECTION, SOLUTION INTRAVENOUS CONTINUOUS
Status: DISCONTINUED | OUTPATIENT
Start: 2023-10-31 | End: 2023-10-31 | Stop reason: HOSPADM

## 2023-10-31 RX ORDER — PROPOFOL 10 MG/ML
INJECTION, EMULSION INTRAVENOUS AS NEEDED
Status: DISCONTINUED | OUTPATIENT
Start: 2023-10-31 | End: 2023-11-01

## 2023-10-31 RX ORDER — LABETALOL HYDROCHLORIDE 5 MG/ML
5 INJECTION, SOLUTION INTRAVENOUS ONCE AS NEEDED
Status: CANCELLED | OUTPATIENT
Start: 2023-10-31

## 2023-10-31 RX ORDER — MIDAZOLAM HYDROCHLORIDE 1 MG/ML
INJECTION, SOLUTION INTRAMUSCULAR; INTRAVENOUS AS NEEDED
Status: DISCONTINUED | OUTPATIENT
Start: 2023-10-31 | End: 2023-11-01

## 2023-10-31 RX ADMIN — KETOROLAC TROMETHAMINE 30 MG: 30 INJECTION, SOLUTION INTRAMUSCULAR at 11:47

## 2023-10-31 RX ADMIN — MIDAZOLAM 2 MG: 1 INJECTION INTRAMUSCULAR; INTRAVENOUS at 11:08

## 2023-10-31 RX ADMIN — PROPOFOL 50 MG: 10 INJECTION, EMULSION INTRAVENOUS at 11:11

## 2023-10-31 RX ADMIN — PROPOFOL 25 MCG/KG/MIN: 10 INJECTION, EMULSION INTRAVENOUS at 11:11

## 2023-10-31 RX ADMIN — SODIUM CHLORIDE, POTASSIUM CHLORIDE, SODIUM LACTATE AND CALCIUM CHLORIDE 100 ML/HR: 600; 310; 30; 20 INJECTION, SOLUTION INTRAVENOUS at 09:10

## 2023-10-31 RX ADMIN — FENTANYL CITRATE 100 MCG: 50 INJECTION, SOLUTION INTRAMUSCULAR; INTRAVENOUS at 11:08

## 2023-10-31 RX ADMIN — CEFAZOLIN SODIUM 2 G: 2 INJECTION, SOLUTION INTRAVENOUS at 11:14

## 2023-10-31 SDOH — HEALTH STABILITY: MENTAL HEALTH: CURRENT SMOKER: 1

## 2023-10-31 ASSESSMENT — PAIN - FUNCTIONAL ASSESSMENT
PAIN_FUNCTIONAL_ASSESSMENT: 0-10
PAIN_FUNCTIONAL_ASSESSMENT: 0-10

## 2023-10-31 ASSESSMENT — PAIN SCALES - GENERAL
PAINLEVEL_OUTOF10: 0 - NO PAIN

## 2023-10-31 ASSESSMENT — COLUMBIA-SUICIDE SEVERITY RATING SCALE - C-SSRS
6. HAVE YOU EVER DONE ANYTHING, STARTED TO DO ANYTHING, OR PREPARED TO DO ANYTHING TO END YOUR LIFE?: NO
2. HAVE YOU ACTUALLY HAD ANY THOUGHTS OF KILLING YOURSELF?: NO
1. IN THE PAST MONTH, HAVE YOU WISHED YOU WERE DEAD OR WISHED YOU COULD GO TO SLEEP AND NOT WAKE UP?: NO

## 2023-10-31 NOTE — DISCHARGE INSTRUCTIONS
Please keep dressing intact for left elbow, do not get this wet  Okay to cleanse left cheek incision site with soap and water starting tomorrow and apply gentamicin ointment (in belongings bag), twice daily  No heavy lifting or strenuous activity  Please do not remove dressing on left elbow  Follow-up with me in the office 1 week    Moderate Sedation in Adults Discharge Instructions    About this topic  Moderate sedation is also known as conscious sedation. It changes your state of being awake or consciousness. With this sedation, you may feel slight pain or pressure during a procedure. The drugs help you to relax and may even allow you to sleep. It will be easy to wake you and you may talk and answer questions while under sedation. Most likely, you will not remember what happens while under this sedation.  What care is needed at home?  Ask your doctor what you need to do when you go home. Make sure you understand everything the doctor says. This way you will know what you need to do.  You will not be allowed to drive right away after the procedure. Ask a family member or a friend to drive you home.  Do not operate heavy or dangerous machinery for at least 24 hours.  Do not make major decisions or sign important papers for at least 24 hours. You may not be thinking clearly.  Avoid beer, wine, or mixed drinks (alcohol) for at least 24 hours.  You are at a higher risk of falling for at least 24 hours after moderate sedation.  Take extra care when you get up.  Do not change positions quickly.  Do not rush when you need to go to the bathroom or to answer the phone.  Ask for help if you feel unsteady when you try to walk.  Wear shoes with non-slip soles and low heels.  What follow-up care is needed?  Your doctor may ask you to make visits to the office to check on your progress. Be sure to keep these visits. Your doctor may also refer you to other doctors or tell you that you need more tests or care.  What drugs may be  needed?  The doctor may order drugs to:  Help with pain  Treat an upset stomach or throwing up  Will physical activity be limited?  Rest for the day of the procedure. Avoid strenuous activities like heavy lifting and hard exercise. Talk to your doctor about whether you need to limit lifting or exercise after your procedure.  What changes to diet are needed?  Start with a light diet when you are fully awake. This includes things that are easy to swallow like soups, pudding, jello, toast, and eggs. Slowly progress to your normal diet.  What problems could happen?  Low blood pressure  Breathing problems  Upset stomach or throwing up  Dizziness  When do I need to call the doctor?  Feel dizzy, weak, or tired  Faint  Very bad headache  Upset stomach or throwing up  To follow up for more tests or care  Teach Back: Helping You Understand  The Teach Back Method helps you understand the information we are giving you. After you talk with the staff, tell them in your own words what you learned. This helps to make sure the staff has described each thing clearly. It also helps to explain things that may have been confusing. Before going home, make sure you can do these:  I can tell you about my procedure.  I can tell you if I need more tests or care.  I can tell you what is good for me to eat and drink the next day.  I can tell you what I would do if I feel dizzy, weak, or tired.  Last Reviewed Date  2020-03-02

## 2023-10-31 NOTE — BRIEF OP NOTE
Date: 10/31/2023  OR Location: [facility]    Name: Cortney Ruby, : 1960, Age: 62 y.o., MRN: [id number], Sex: female    Diagnosis  Pre-op Diagnosis     * Cheek mass [R22.0]     * Soft tissue mass [M79.89] Post-op Diagnosis     * Cheek mass [R22.0]     * Soft tissue mass [M79.89]     Procedures    * EXCISION LEFT CHEEK MASS AND LEFT ELBOW MASS    Surgeons      * Roland J Reyes - Primary    Resident/Fellow/Other Assistant:  Surgeon(s) and Role:     * Elliot Aceves PA-C - Assisting    Procedure Summary  Anesthesia: Monitor Anesthesia Care  ASA: III  Anesthesia Staff: Anesthesiologist: Reggie Owens MD  Estimated Blood Loss: Minimal mL  Intra-op Medications: * No intraprocedure medications in log *           Anesthesia Record               Intraprocedure I/O Totals          Intake    Propofol Drip 0.00 mL    The total shown is the total volume documented since Anesthesia Start was filed.    Total Intake 0 mL          Specimen:   ID Type Source Tests Collected by Time   1 : LEFT ELBOW MASS Tissue SOFT TISSUE MASS RESECTION SURGICAL PATHOLOGY EXAM Roland J Reyes, MD 10/31/2023 1140   2 : LEFT CHEEK MASS Tissue SOFT TISSUE MASS RESECTION SURGICAL PATHOLOGY EXAM Roland J Reyes, MD 10/31/2023 1145        Staff:   Circulator: Kiesha Milton RN  Relief Circulator: Mayra Melendez RN  Scrub Person: Primo Timpmercy          Findings: Mass left elbow, left cheek    Complications:  None; patient tolerated the procedure well.     Disposition: PACU - hemodynamically stable.  Condition: stable  Specimens Collected:   ID Type Source Tests Collected by Time   1 : LEFT ELBOW MASS Tissue SOFT TISSUE MASS RESECTION SURGICAL PATHOLOGY EXAM Roland J Reyes, MD 10/31/2023 1140   2 : LEFT CHEEK MASS Tissue SOFT TISSUE MASS RESECTION SURGICAL PATHOLOGY EXAM Roland J Reyes, MD 10/31/2023 1145     Attending Attestation: I was present and scrubbed for the entire procedure.    Roland J Reyes  Phone Number: [phone]

## 2023-10-31 NOTE — ANESTHESIA PREPROCEDURE EVALUATION
Patient: Cortney Ruby    Procedure Information       Date/Time: 10/31/23 1000    Procedure: EXCISION LEFT CHEEK MASS AND LEFT ELBOW MASS WT++ (Left: Hand) - CPT 87222, 03795, 60557, 58097    Location: [facility] / [facility]    Surgeons: Roland J Reyes, MD            Relevant Problems   Anesthesia (within normal limits)      Cardiovascular   (+) Essential hypertension   (+) First degree AV block   (+) Mixed hyperlipidemia   (+) Prolonged QT interval      Endocrine   (+) Class 2 obesity with body mass index (BMI) of 39.0 to 39.9 in adult   (+) Type 2 diabetes mellitus with hyperglycemia, with long-term current use of insulin (CMS/HCC)      GI (within normal limits)      /Renal (within normal limits)      Neuro/Psych   (+) Carpal tunnel syndrome   (+) Cervical radiculopathy   (+) Cubital tunnel syndrome on left      Pulmonary   (+) Chronic obstructive pulmonary disease (CMS/HCC)      GI/Hepatic   (+) Gall stones      Hematology (within normal limits)      Musculoskeletal   (+) Carpal tunnel syndrome   (+) DJD (degenerative joint disease), cervical   (+) Right knee DJD      Eyes, Ears, Nose, and Throat (within normal limits)      Infectious Disease (within normal limits)      Other   (+) Generalized arthritis       Clinical information reviewed:   Tobacco  Allergies  Meds   Med Hx  Surg Hx  OB Status  Fam Hx  Soc   Hx        NPO Detail:  NPO/Void Status  Date of Last Liquid: 10/30/23  Time of Last Liquid: 2230  Date of Last Solid: 10/30/23  Time of Last Solid: 2130  Last Intake Type: Clear fluids  Time of Last Void: 0730         Physical Exam    Airway  Mallampati: III     Cardiovascular - normal exam  Rhythm: regular     Dental - normal exam  Comments: Missing right upper incisor   Pulmonary - normal exam     Abdominal - normal exam             Anesthesia Plan    ASA 3     MAC     The patient is a current smoker.  Patient was previously instructed to abstain from smoking on day of procedure.    intravenous  induction   Anesthetic plan and risks discussed with patient.

## 2023-10-31 NOTE — POST-PROCEDURE NOTE
I was the surgical first assist to Dr. Roland Reyes  for Cortney Ruby's procedure on 10/31/23.     Procedure: Excision mass left cheek and excision mass left elbow     Elliot Aceves PA-C

## 2023-11-01 ASSESSMENT — PAIN SCALES - GENERAL: PAIN_LEVEL: 1

## 2023-11-01 NOTE — ANESTHESIA POSTPROCEDURE EVALUATION
Patient: Cortney Ruby    Procedure Summary       Date: 10/31/23 Room / Location: [facility] / [facility]    Anesthesia Start: 1108 Anesthesia Stop: 1204    Procedure: EXCISION LEFT CHEEK MASS AND LEFT ELBOW MASS (Left: Hand) Diagnosis:       Cheek mass      Soft tissue mass      (R22.0  M79.89)    Surgeons: Roland J Reyes, MD Responsible Provider: Reggie Owens MD    Anesthesia Type: MAC ASA Status: 3            Anesthesia Type: MAC    Vitals Value Taken Time   /71 10/31/23 1252   Temp 36.2 °C (97.2 °F) 10/31/23 1252   Pulse 66 10/31/23 1252   Resp 18 10/31/23 1252   SpO2 96 % 10/31/23 1252       Anesthesia Post Evaluation    Patient location during evaluation: PACU  Patient participation: complete - patient participated  Level of consciousness: awake and alert  Pain score: 1  Pain management: satisfactory to patient  Airway patency: patent  Cardiovascular status: stable  Respiratory status: acceptable  Hydration status: acceptable        No notable events documented.

## 2023-11-08 ENCOUNTER — OFFICE VISIT (OUTPATIENT)
Dept: PLASTIC SURGERY | Facility: CLINIC | Age: 63
End: 2023-11-08
Payer: MEDICARE

## 2023-11-08 VITALS
WEIGHT: 230 LBS | SYSTOLIC BLOOD PRESSURE: 138 MMHG | HEIGHT: 65 IN | BODY MASS INDEX: 38.32 KG/M2 | DIASTOLIC BLOOD PRESSURE: 82 MMHG | TEMPERATURE: 97.8 F

## 2023-11-08 DIAGNOSIS — E11.69 TYPE 2 DIABETES MELLITUS WITH OTHER SPECIFIED COMPLICATION, WITH LONG-TERM CURRENT USE OF INSULIN (HCC): ICD-10-CM

## 2023-11-08 DIAGNOSIS — R22.0 CHEEK MASS: Primary | ICD-10-CM

## 2023-11-08 DIAGNOSIS — M79.89 MASS OF SOFT TISSUE OF ELBOW: ICD-10-CM

## 2023-11-08 DIAGNOSIS — Z79.4 TYPE 2 DIABETES MELLITUS WITH OTHER SPECIFIED COMPLICATION, WITH LONG-TERM CURRENT USE OF INSULIN (HCC): ICD-10-CM

## 2023-11-08 LAB
ANION GAP SERPL CALCULATED.3IONS-SCNC: 15 MEQ/L (ref 9–15)
BUN SERPL-MCNC: 7 MG/DL (ref 8–23)
CALCIUM SERPL-MCNC: 9.1 MG/DL (ref 8.5–9.9)
CHLORIDE SERPL-SCNC: 103 MEQ/L (ref 95–107)
CO2 SERPL-SCNC: 23 MEQ/L (ref 20–31)
CREAT SERPL-MCNC: 0.57 MG/DL (ref 0.5–0.9)
CREAT UR-MCNC: 165.2 MG/DL
GLUCOSE SERPL-MCNC: 126 MG/DL (ref 70–99)
HBA1C MFR BLD: 8.7 % (ref 4.8–5.9)
MICROALBUMIN UR-MCNC: <1.2 MG/DL
MICROALBUMIN/CREAT UR-RTO: NORMAL MG/G (ref 0–30)
POTASSIUM SERPL-SCNC: 4 MEQ/L (ref 3.4–4.9)
SODIUM SERPL-SCNC: 141 MEQ/L (ref 135–144)

## 2023-11-08 PROCEDURE — 4004F PT TOBACCO SCREEN RCVD TLK: CPT | Performed by: PLASTIC SURGERY

## 2023-11-08 PROCEDURE — 99024 POSTOP FOLLOW-UP VISIT: CPT | Performed by: PLASTIC SURGERY

## 2023-11-08 PROCEDURE — 3075F SYST BP GE 130 - 139MM HG: CPT | Performed by: PLASTIC SURGERY

## 2023-11-08 PROCEDURE — 3079F DIAST BP 80-89 MM HG: CPT | Performed by: PLASTIC SURGERY

## 2023-11-08 PROCEDURE — 3008F BODY MASS INDEX DOCD: CPT | Performed by: PLASTIC SURGERY

## 2023-11-08 ASSESSMENT — COLUMBIA-SUICIDE SEVERITY RATING SCALE - C-SSRS
1. IN THE PAST MONTH, HAVE YOU WISHED YOU WERE DEAD OR WISHED YOU COULD GO TO SLEEP AND NOT WAKE UP?: NO
2. HAVE YOU ACTUALLY HAD ANY THOUGHTS OF KILLING YOURSELF?: NO
6. HAVE YOU EVER DONE ANYTHING, STARTED TO DO ANYTHING, OR PREPARED TO DO ANYTHING TO END YOUR LIFE?: NO

## 2023-11-08 ASSESSMENT — PATIENT HEALTH QUESTIONNAIRE - PHQ9
1. LITTLE INTEREST OR PLEASURE IN DOING THINGS: NOT AT ALL
SUM OF ALL RESPONSES TO PHQ9 QUESTIONS 1 AND 2: 0
2. FEELING DOWN, DEPRESSED OR HOPELESS: NOT AT ALL

## 2023-11-08 NOTE — PROGRESS NOTES
Subjective   Patient ID: Cortney Ruby is a 62 y.o. female.    HPI  Patient is status post excision of left cheek mass and a left elbow mass.  Pathology report pending  Review of Systems    At least 10 systems reviewed and are otherwise negative except for as noted in the history of present illness  Objective   Physical Exam  Examination of the left cheek incision reveals it is nicely approximated there is no signs of infection.  Examination of the left elbow incision reveals it is well approximated there is no signs of infection.  Band-Aid dressing applied.  Wound care instructions discussed with the patient  Assessment/Plan   There are no diagnoses linked to this encounter.  Status post excision mass left cheek and left elbow, pathology pending  Wound care instructions discussed with the patient  Follow-up 2 weeks for suture removal of the left elbow

## 2023-11-10 LAB
LABORATORY COMMENT REPORT: NORMAL
PATH REPORT.FINAL DX SPEC: NORMAL
PATH REPORT.GROSS SPEC: NORMAL
PATH REPORT.RELEVANT HX SPEC: NORMAL
PATH REPORT.TOTAL CANCER: NORMAL

## 2023-11-13 ENCOUNTER — OFFICE VISIT (OUTPATIENT)
Dept: ENDOCRINOLOGY | Age: 63
End: 2023-11-13

## 2023-11-13 VITALS
HEIGHT: 64 IN | HEART RATE: 80 BPM | DIASTOLIC BLOOD PRESSURE: 67 MMHG | WEIGHT: 229 LBS | SYSTOLIC BLOOD PRESSURE: 110 MMHG | BODY MASS INDEX: 39.09 KG/M2 | OXYGEN SATURATION: 95 %

## 2023-11-13 DIAGNOSIS — Z79.4 TYPE 2 DIABETES MELLITUS WITH OTHER SPECIFIED COMPLICATION, WITH LONG-TERM CURRENT USE OF INSULIN (HCC): Primary | ICD-10-CM

## 2023-11-13 DIAGNOSIS — E11.69 TYPE 2 DIABETES MELLITUS WITH OTHER SPECIFIED COMPLICATION, WITH LONG-TERM CURRENT USE OF INSULIN (HCC): Primary | ICD-10-CM

## 2023-11-13 DIAGNOSIS — E66.01 MORBID OBESITY (HCC): ICD-10-CM

## 2023-11-13 LAB
CHP ED QC CHECK: NORMAL
GLUCOSE BLD-MCNC: 270 MG/DL

## 2023-11-13 RX ORDER — SODIUM CHLORIDE, SODIUM LACTATE, POTASSIUM CHLORIDE, CALCIUM CHLORIDE 600; 310; 30; 20 MG/100ML; MG/100ML; MG/100ML; MG/100ML
100 INJECTION, SOLUTION INTRAVENOUS
COMMUNITY
Start: 2023-10-31

## 2023-11-13 RX ORDER — FLUTICASONE FUROATE, UMECLIDINIUM BROMIDE AND VILANTEROL TRIFENATATE 100; 62.5; 25 UG/1; UG/1; UG/1
1 POWDER RESPIRATORY (INHALATION) DAILY
COMMUNITY
Start: 2023-10-25

## 2023-11-13 NOTE — PROGRESS NOTES
Rate and Rhythm: Normal rate. Pulmonary:      Effort: Pulmonary effort is normal.   Musculoskeletal:         General: Normal range of motion. Cervical back: Normal range of motion and neck supple. Neurological:      General: No focal deficit present. Mental Status: She is alert and oriented to person, place, and time.    Psychiatric:         Mood and Affect: Mood normal.         Behavior: Behavior normal.

## 2023-11-19 ASSESSMENT — ENCOUNTER SYMPTOMS: EYES NEGATIVE: 1

## 2023-11-22 ENCOUNTER — OFFICE VISIT (OUTPATIENT)
Dept: PLASTIC SURGERY | Facility: CLINIC | Age: 63
End: 2023-11-22
Payer: MEDICARE

## 2023-11-22 VITALS — HEIGHT: 65 IN | WEIGHT: 230 LBS | BODY MASS INDEX: 38.32 KG/M2

## 2023-11-22 DIAGNOSIS — R22.0 CHEEK MASS: ICD-10-CM

## 2023-11-22 DIAGNOSIS — M79.89 MASS OF SOFT TISSUE OF ELBOW: Primary | ICD-10-CM

## 2023-11-22 PROCEDURE — 3008F BODY MASS INDEX DOCD: CPT | Performed by: PLASTIC SURGERY

## 2023-11-22 PROCEDURE — 99024 POSTOP FOLLOW-UP VISIT: CPT | Performed by: PLASTIC SURGERY

## 2023-11-22 PROCEDURE — 4004F PT TOBACCO SCREEN RCVD TLK: CPT | Performed by: PLASTIC SURGERY

## 2023-11-22 ASSESSMENT — ENCOUNTER SYMPTOMS
FEVER: 0
CHILLS: 0

## 2023-11-22 ASSESSMENT — PAIN SCALES - GENERAL: PAINLEVEL: 0-NO PAIN

## 2023-11-22 NOTE — PROGRESS NOTES
Subjective   Patient ID: Cortney Ruby is a 62 y.o. female.    HPI  Patient is status post excision epidermal inclusion cyst left cheek and left elbow patient is here for suture removal.  Pathology report provided to the patient  Review of Systems   Constitutional:  Negative for chills and fever.       Objective   Physical Exam  Examination reveals both incisions are nicely approximated.  The sutures were removed from the left elbow.  Assessment/Plan   There are no diagnoses linked to this encounter.  Status post excision epidermal inclusion cyst left elbow and left cheek, doing well  Follow-up as needed

## 2023-12-12 ENCOUNTER — APPOINTMENT (OUTPATIENT)
Dept: ORTHOPEDIC SURGERY | Facility: CLINIC | Age: 63
End: 2023-12-12
Payer: MEDICARE

## 2024-01-03 DIAGNOSIS — E78.2 MIXED HYPERLIPIDEMIA: ICD-10-CM

## 2024-01-03 RX ORDER — ATORVASTATIN CALCIUM 40 MG/1
40 TABLET, FILM COATED ORAL DAILY
Qty: 90 TABLET | Refills: 3 | Status: SHIPPED | OUTPATIENT
Start: 2024-01-03

## 2024-01-09 RX ORDER — PEN NEEDLE, DIABETIC 32 GX 1/4"
NEEDLE, DISPOSABLE MISCELLANEOUS
Qty: 100 EACH | Refills: 5 | Status: SHIPPED | OUTPATIENT
Start: 2024-01-09

## 2024-01-09 NOTE — TELEPHONE ENCOUNTER
Patient requesting medication refill. Please approve or deny this request.    Rx requested:  Requested Prescriptions     Pending Prescriptions Disp Refills    Insulin Pen Needle (BD PEN NEEDLE MICRO U/F) 32G X 6 MM MISC 100 each 5     Sig: USE TWICE DAILY         Last Office Visit:   11/13/2023      Next Visit Date:  Future Appointments   Date Time Provider Department Center   5/13/2024  1:15 PM Jean Paul De Jesus MD [facility]

## 2024-01-10 ENCOUNTER — TELEPHONE (OUTPATIENT)
Dept: ENDOCRINOLOGY | Age: 64
End: 2024-01-10

## 2024-01-10 DIAGNOSIS — Z79.4 TYPE 2 DIABETES MELLITUS WITH OTHER SPECIFIED COMPLICATION, WITH LONG-TERM CURRENT USE OF INSULIN (HCC): Primary | ICD-10-CM

## 2024-01-10 DIAGNOSIS — E11.69 TYPE 2 DIABETES MELLITUS WITH OTHER SPECIFIED COMPLICATION, WITH LONG-TERM CURRENT USE OF INSULIN (HCC): Primary | ICD-10-CM

## 2024-01-10 NOTE — TELEPHONE ENCOUNTER
Patient called in to advise her insurance will no longer cover Humalog. They want her to try novalog 70/30 they will cover that.  Please advise if she is able to switch.

## 2024-01-22 ENCOUNTER — TELEPHONE (OUTPATIENT)
Dept: PRIMARY CARE | Facility: CLINIC | Age: 64
End: 2024-01-22
Payer: MEDICARE

## 2024-01-22 DIAGNOSIS — M19.90 GENERALIZED ARTHRITIS: ICD-10-CM

## 2024-01-22 RX ORDER — IBUPROFEN 800 MG/1
800 TABLET ORAL EVERY 8 HOURS PRN
Qty: 270 TABLET | Refills: 0 | Status: SHIPPED | OUTPATIENT
Start: 2024-01-22

## 2024-01-22 NOTE — TELEPHONE ENCOUNTER
Patient phones the office today w/ request to fill her script for Ibuprofen 800mg 1 TAB q8hrs. Prn for back pain.     Patient would like this to be sent to [facility] on [address].     Thank you.

## 2024-02-12 DIAGNOSIS — J44.9 CHRONIC OBSTRUCTIVE PULMONARY DISEASE, UNSPECIFIED COPD TYPE (MULTI): ICD-10-CM

## 2024-02-12 RX ORDER — FLUTICASONE FUROATE, UMECLIDINIUM BROMIDE AND VILANTEROL TRIFENATATE 100; 62.5; 25 UG/1; UG/1; UG/1
1 POWDER RESPIRATORY (INHALATION) DAILY
Qty: 60 EACH | Refills: 3 | Status: SHIPPED | OUTPATIENT
Start: 2024-02-12

## 2024-03-19 DIAGNOSIS — E11.69 TYPE 2 DIABETES MELLITUS WITH OTHER SPECIFIED COMPLICATION, WITH LONG-TERM CURRENT USE OF INSULIN (HCC): ICD-10-CM

## 2024-03-19 DIAGNOSIS — Z79.4 TYPE 2 DIABETES MELLITUS WITH OTHER SPECIFIED COMPLICATION, WITH LONG-TERM CURRENT USE OF INSULIN (HCC): ICD-10-CM

## 2024-03-19 NOTE — TELEPHONE ENCOUNTER
Patient requesting medication refill. Please approve or deny this request.    Rx requested:  Requested Prescriptions     Pending Prescriptions Disp Refills    insulin aspart prot & aspart (NOVOLOG 70/30) injection pen 15 mL 3     Sig: ADMINISTER 50 UNITS UNDER THE SKIN TWICE DAILY         Last Office Visit:   11/13/2023      Next Visit Date:  Future Appointments   Date Time Provider Department Center   5/30/2024  1:30 PM Jean Paul De Jesus MD [facility]

## 2024-04-09 DIAGNOSIS — Z79.4 TYPE 2 DIABETES MELLITUS WITH OTHER SPECIFIED COMPLICATION, WITH LONG-TERM CURRENT USE OF INSULIN (HCC): ICD-10-CM

## 2024-04-09 DIAGNOSIS — E11.69 TYPE 2 DIABETES MELLITUS WITH OTHER SPECIFIED COMPLICATION, WITH LONG-TERM CURRENT USE OF INSULIN (HCC): ICD-10-CM

## 2024-04-09 RX ORDER — INSULIN LISPRO 100 [IU]/ML
INJECTION, SUSPENSION SUBCUTANEOUS
Qty: 15 ML | Refills: 5 | Status: SHIPPED | OUTPATIENT
Start: 2024-04-09

## 2024-04-12 ENCOUNTER — TELEPHONE (OUTPATIENT)
Dept: PRIMARY CARE | Facility: CLINIC | Age: 64
End: 2024-04-12
Payer: MEDICARE

## 2024-04-12 NOTE — TELEPHONE ENCOUNTER
[facility] call in to verify patient phone number. They had the same Number. Was requesting to contacted Patient for medication refill for Atorvastatin 40 mg. Wasn't able to understand the Rep name.    Please assist

## 2024-04-24 ENCOUNTER — APPOINTMENT (OUTPATIENT)
Dept: CARDIOLOGY | Facility: CLINIC | Age: 64
End: 2024-04-24
Payer: MEDICARE

## 2024-05-23 DIAGNOSIS — E11.69 TYPE 2 DIABETES MELLITUS WITH OTHER SPECIFIED COMPLICATION, WITH LONG-TERM CURRENT USE OF INSULIN (HCC): ICD-10-CM

## 2024-05-23 DIAGNOSIS — Z79.4 TYPE 2 DIABETES MELLITUS WITH OTHER SPECIFIED COMPLICATION, WITH LONG-TERM CURRENT USE OF INSULIN (HCC): ICD-10-CM

## 2024-05-24 DIAGNOSIS — Z79.4 TYPE 2 DIABETES MELLITUS WITH OTHER SPECIFIED COMPLICATION, WITH LONG-TERM CURRENT USE OF INSULIN (HCC): ICD-10-CM

## 2024-05-24 DIAGNOSIS — E11.69 TYPE 2 DIABETES MELLITUS WITH OTHER SPECIFIED COMPLICATION, WITH LONG-TERM CURRENT USE OF INSULIN (HCC): ICD-10-CM

## 2024-05-24 LAB
ANION GAP SERPL CALCULATED.3IONS-SCNC: 11 MEQ/L (ref 9–15)
BUN SERPL-MCNC: 10 MG/DL (ref 8–23)
CALCIUM SERPL-MCNC: 8.8 MG/DL (ref 8.5–9.9)
CHLORIDE SERPL-SCNC: 107 MEQ/L (ref 95–107)
CO2 SERPL-SCNC: 25 MEQ/L (ref 20–31)
CREAT SERPL-MCNC: 0.52 MG/DL (ref 0.5–0.9)
ESTIMATED AVERAGE GLUCOSE: 209 MG/DL
GLUCOSE SERPL-MCNC: 152 MG/DL (ref 70–99)
HBA1C MFR BLD: 8.9 % (ref 4–6)
POTASSIUM SERPL-SCNC: 4 MEQ/L (ref 3.4–4.9)
SODIUM SERPL-SCNC: 143 MEQ/L (ref 135–144)

## 2024-05-24 RX ORDER — INSULIN ASPART 100 [IU]/ML
INJECTION, SUSPENSION SUBCUTANEOUS
Qty: 15 ML | Refills: 3 | OUTPATIENT
Start: 2024-05-24

## 2024-05-30 ENCOUNTER — OFFICE VISIT (OUTPATIENT)
Dept: ENDOCRINOLOGY | Age: 64
End: 2024-05-30
Payer: COMMERCIAL

## 2024-05-30 VITALS
SYSTOLIC BLOOD PRESSURE: 113 MMHG | DIASTOLIC BLOOD PRESSURE: 71 MMHG | BODY MASS INDEX: 38.58 KG/M2 | HEART RATE: 80 BPM | HEIGHT: 64 IN | WEIGHT: 226 LBS | OXYGEN SATURATION: 93 %

## 2024-05-30 DIAGNOSIS — E11.69 TYPE 2 DIABETES MELLITUS WITH OTHER SPECIFIED COMPLICATION, WITH LONG-TERM CURRENT USE OF INSULIN (HCC): Primary | ICD-10-CM

## 2024-05-30 DIAGNOSIS — Z79.4 TYPE 2 DIABETES MELLITUS WITH OTHER SPECIFIED COMPLICATION, WITH LONG-TERM CURRENT USE OF INSULIN (HCC): Primary | ICD-10-CM

## 2024-05-30 LAB
CHP ED QC CHECK: NORMAL
GLUCOSE BLD-MCNC: 145 MG/DL

## 2024-05-30 PROCEDURE — 99213 OFFICE O/P EST LOW 20 MIN: CPT | Performed by: INTERNAL MEDICINE

## 2024-05-30 PROCEDURE — 3074F SYST BP LT 130 MM HG: CPT | Performed by: INTERNAL MEDICINE

## 2024-05-30 PROCEDURE — 3078F DIAST BP <80 MM HG: CPT | Performed by: INTERNAL MEDICINE

## 2024-05-30 PROCEDURE — 82962 GLUCOSE BLOOD TEST: CPT | Performed by: INTERNAL MEDICINE

## 2024-05-30 PROCEDURE — 3052F HG A1C>EQUAL 8.0%<EQUAL 9.0%: CPT | Performed by: INTERNAL MEDICINE

## 2024-05-30 PROCEDURE — 1123F ACP DISCUSS/DSCN MKR DOCD: CPT | Performed by: INTERNAL MEDICINE

## 2024-05-30 RX ORDER — GLUCOSAMINE HCL/CHONDROITIN SU 500-400 MG
CAPSULE ORAL
Qty: 100 STRIP | Refills: 3 | Status: SHIPPED | OUTPATIENT
Start: 2024-05-30

## 2024-05-30 RX ORDER — LANCETS 30 GAUGE
1 EACH MISCELLANEOUS DAILY
Qty: 100 EACH | Refills: 3 | Status: SHIPPED | OUTPATIENT
Start: 2024-05-30

## 2024-05-30 NOTE — PROGRESS NOTES
use: No    Drug use: No    Sexual activity: Not on file   Other Topics Concern    Not on file   Social History Narrative    Not on file     Social Determinants of Health     Financial Resource Strain: Not on file   Food Insecurity: Not on file   Transportation Needs: Not on file   Physical Activity: Not on file   Stress: Not on file   Social Connections: Not on file   Intimate Partner Violence: Not on file   Housing Stability: Not on file     Family History   Problem Relation Age of Onset    Diabetes Mother     High Blood Pressure Mother     High Cholesterol Mother     Thyroid Disease Mother     Diabetes Father      Allergies   Allergen Reactions    Hydrocodone-Acetaminophen     Metformin And Related Diarrhea    Norco [Hydrocodone-Acetaminophen]        Current Outpatient Medications:     insulin aspart prot & aspart (NOVOLOG 70/30) injection pen, ADMINISTER 50 UNITS UNDER THE SKIN TWICE DAILY, Disp: 15 mL, Rfl: 3    insulin lispro protamine & lispro (HUMALOG MIX 75/25 KWIKPEN) (75-25) 100 UNIT per ML SUPN injection pen, ADMINISTER 50 UNITS UNDER THE SKIN TWICE DAILY, Disp: 15 mL, Rfl: 5    Insulin Pen Needle (BD PEN NEEDLE MICRO U/F) 32G X 6 MM MISC, USE TWICE DAILY, Disp: 100 each, Rfl: 5    TRELEGY ELLIPTA 100-62.5-25 MCG/ACT AEPB inhaler, Inhale 1 puff into the lungs daily, Disp: , Rfl:     lactated ringers IV soln infusion, Infuse 100 mL/hr intravenously, Disp: , Rfl:     carBAMazepine (TEGRETOL-XR) 100 MG extended release tablet, Take 1 tablet by mouth 2 times daily, Disp: 180 tablet, Rfl: 1    Elastic Bandages & Supports (MEDICAL COMPRESSION STOCKINGS) MISC, 1 each by Does not apply route daily Knee high 20-30 mmhg compression stockings both legs wear daily during day and off Qhs. Wear as tolerated. Do not wear if they cause increased pain., Disp: 1 each, Rfl: 5    ONE TOUCH ULTRA TEST strip, TEST THREE TIMES DAILY AS NEEDED, Disp: 200 strip, Rfl: 3    atorvastatin (LIPITOR) 40 MG tablet, TAKE 1 TABLET BY

## 2024-07-19 ENCOUNTER — APPOINTMENT (OUTPATIENT)
Dept: PRIMARY CARE | Facility: CLINIC | Age: 64
End: 2024-07-19
Payer: MEDICARE

## 2024-07-22 DIAGNOSIS — E11.69 TYPE 2 DIABETES MELLITUS WITH OTHER SPECIFIED COMPLICATION, WITH LONG-TERM CURRENT USE OF INSULIN (HCC): ICD-10-CM

## 2024-07-22 DIAGNOSIS — Z79.4 TYPE 2 DIABETES MELLITUS WITH OTHER SPECIFIED COMPLICATION, WITH LONG-TERM CURRENT USE OF INSULIN (HCC): ICD-10-CM

## 2024-08-27 ENCOUNTER — LAB (OUTPATIENT)
Dept: LAB | Facility: LAB | Age: 64
End: 2024-08-27
Payer: COMMERCIAL

## 2024-08-27 ENCOUNTER — APPOINTMENT (OUTPATIENT)
Dept: PRIMARY CARE | Facility: CLINIC | Age: 64
End: 2024-08-27
Payer: MEDICARE

## 2024-08-27 VITALS
HEIGHT: 65 IN | OXYGEN SATURATION: 98 % | TEMPERATURE: 97.1 F | RESPIRATION RATE: 16 BRPM | WEIGHT: 226 LBS | HEART RATE: 80 BPM | DIASTOLIC BLOOD PRESSURE: 66 MMHG | SYSTOLIC BLOOD PRESSURE: 126 MMHG | BODY MASS INDEX: 37.65 KG/M2

## 2024-08-27 DIAGNOSIS — F17.210 CIGARETTE NICOTINE DEPENDENCE WITHOUT COMPLICATION: ICD-10-CM

## 2024-08-27 DIAGNOSIS — I10 ESSENTIAL HYPERTENSION: ICD-10-CM

## 2024-08-27 DIAGNOSIS — Z00.00 ENCOUNTER FOR MEDICARE ANNUAL WELLNESS EXAM: ICD-10-CM

## 2024-08-27 DIAGNOSIS — Z23 NEED FOR VACCINATION: ICD-10-CM

## 2024-08-27 DIAGNOSIS — M19.90 GENERALIZED ARTHRITIS: ICD-10-CM

## 2024-08-27 DIAGNOSIS — E78.2 MIXED HYPERLIPIDEMIA: ICD-10-CM

## 2024-08-27 DIAGNOSIS — J44.9 CHRONIC OBSTRUCTIVE PULMONARY DISEASE, UNSPECIFIED COPD TYPE (MULTI): ICD-10-CM

## 2024-08-27 DIAGNOSIS — E11.65 TYPE 2 DIABETES MELLITUS WITH HYPERGLYCEMIA, WITH LONG-TERM CURRENT USE OF INSULIN (MULTI): ICD-10-CM

## 2024-08-27 DIAGNOSIS — Z79.4 TYPE 2 DIABETES MELLITUS WITH HYPERGLYCEMIA, WITH LONG-TERM CURRENT USE OF INSULIN (MULTI): ICD-10-CM

## 2024-08-27 DIAGNOSIS — E66.01 MORBID OBESITY (MULTI): ICD-10-CM

## 2024-08-27 DIAGNOSIS — Z12.11 COLON CANCER SCREENING: ICD-10-CM

## 2024-08-27 DIAGNOSIS — M65.332 TRIGGER MIDDLE FINGER OF LEFT HAND: ICD-10-CM

## 2024-08-27 DIAGNOSIS — F32.1 CURRENT MODERATE EPISODE OF MAJOR DEPRESSIVE DISORDER, UNSPECIFIED WHETHER RECURRENT (MULTI): Primary | ICD-10-CM

## 2024-08-27 LAB
ALBUMIN SERPL BCP-MCNC: 4.1 G/DL (ref 3.4–5)
ALP SERPL-CCNC: 112 U/L (ref 33–136)
ALT SERPL W P-5'-P-CCNC: 24 U/L (ref 7–45)
ANION GAP SERPL CALC-SCNC: 12 MMOL/L (ref 10–20)
AST SERPL W P-5'-P-CCNC: 18 U/L (ref 9–39)
BASOPHILS # BLD AUTO: 0.07 X10*3/UL (ref 0–0.1)
BASOPHILS NFR BLD AUTO: 0.8 %
BILIRUB SERPL-MCNC: 0.5 MG/DL (ref 0–1.2)
BUN SERPL-MCNC: 9 MG/DL (ref 6–23)
CALCIUM SERPL-MCNC: 9.2 MG/DL (ref 8.6–10.3)
CHLORIDE SERPL-SCNC: 106 MMOL/L (ref 98–107)
CHOLEST SERPL-MCNC: 153 MG/DL (ref 0–199)
CHOLESTEROL/HDL RATIO: 2.7
CO2 SERPL-SCNC: 25 MMOL/L (ref 21–32)
CREAT SERPL-MCNC: 0.61 MG/DL (ref 0.5–1.05)
CREAT UR-MCNC: 108.6 MG/DL (ref 20–320)
EGFRCR SERPLBLD CKD-EPI 2021: >90 ML/MIN/1.73M*2
EOSINOPHIL # BLD AUTO: 0.28 X10*3/UL (ref 0–0.7)
EOSINOPHIL NFR BLD AUTO: 3 %
ERYTHROCYTE [DISTWIDTH] IN BLOOD BY AUTOMATED COUNT: 12.5 % (ref 11.5–14.5)
GLUCOSE SERPL-MCNC: 155 MG/DL (ref 74–99)
HCT VFR BLD AUTO: 44.1 % (ref 36–46)
HDLC SERPL-MCNC: 57.2 MG/DL
HGB BLD-MCNC: 14.3 G/DL (ref 12–16)
IMM GRANULOCYTES # BLD AUTO: 0.02 X10*3/UL (ref 0–0.7)
IMM GRANULOCYTES NFR BLD AUTO: 0.2 % (ref 0–0.9)
LDLC SERPL CALC-MCNC: 63 MG/DL
LYMPHOCYTES # BLD AUTO: 3.58 X10*3/UL (ref 1.2–4.8)
LYMPHOCYTES NFR BLD AUTO: 39 %
MCH RBC QN AUTO: 28.7 PG (ref 26–34)
MCHC RBC AUTO-ENTMCNC: 32.4 G/DL (ref 32–36)
MCV RBC AUTO: 88 FL (ref 80–100)
MICROALBUMIN UR-MCNC: 12.3 MG/L
MICROALBUMIN/CREAT UR: 11.3 UG/MG CREAT
MONOCYTES # BLD AUTO: 0.48 X10*3/UL (ref 0.1–1)
MONOCYTES NFR BLD AUTO: 5.2 %
NEUTROPHILS # BLD AUTO: 4.76 X10*3/UL (ref 1.2–7.7)
NEUTROPHILS NFR BLD AUTO: 51.8 %
NON HDL CHOLESTEROL: 96 MG/DL (ref 0–149)
NRBC BLD-RTO: 0 /100 WBCS (ref 0–0)
PLATELET # BLD AUTO: 311 X10*3/UL (ref 150–450)
POTASSIUM SERPL-SCNC: 3.7 MMOL/L (ref 3.5–5.3)
PROT SERPL-MCNC: 6.4 G/DL (ref 6.4–8.2)
RBC # BLD AUTO: 4.99 X10*6/UL (ref 4–5.2)
SODIUM SERPL-SCNC: 139 MMOL/L (ref 136–145)
T4 FREE SERPL-MCNC: 0.88 NG/DL (ref 0.61–1.12)
TRIGL SERPL-MCNC: 162 MG/DL (ref 0–149)
TSH SERPL-ACNC: 1.64 MIU/L (ref 0.44–3.98)
VLDL: 32 MG/DL (ref 0–40)
WBC # BLD AUTO: 9.2 X10*3/UL (ref 4.4–11.3)

## 2024-08-27 PROCEDURE — 3074F SYST BP LT 130 MM HG: CPT | Performed by: FAMILY MEDICINE

## 2024-08-27 PROCEDURE — G0296 VISIT TO DETERM LDCT ELIG: HCPCS | Performed by: FAMILY MEDICINE

## 2024-08-27 PROCEDURE — G0444 DEPRESSION SCREEN ANNUAL: HCPCS | Performed by: FAMILY MEDICINE

## 2024-08-27 PROCEDURE — 1170F FXNL STATUS ASSESSED: CPT | Performed by: FAMILY MEDICINE

## 2024-08-27 PROCEDURE — 3078F DIAST BP <80 MM HG: CPT | Performed by: FAMILY MEDICINE

## 2024-08-27 PROCEDURE — 80061 LIPID PANEL: CPT

## 2024-08-27 PROCEDURE — 82043 UR ALBUMIN QUANTITATIVE: CPT

## 2024-08-27 PROCEDURE — 84443 ASSAY THYROID STIM HORMONE: CPT

## 2024-08-27 PROCEDURE — 80053 COMPREHEN METABOLIC PANEL: CPT

## 2024-08-27 PROCEDURE — G0009 ADMIN PNEUMOCOCCAL VACCINE: HCPCS | Performed by: FAMILY MEDICINE

## 2024-08-27 PROCEDURE — 36415 COLL VENOUS BLD VENIPUNCTURE: CPT

## 2024-08-27 PROCEDURE — 84439 ASSAY OF FREE THYROXINE: CPT

## 2024-08-27 PROCEDURE — 90677 PCV20 VACCINE IM: CPT | Performed by: FAMILY MEDICINE

## 2024-08-27 PROCEDURE — 82570 ASSAY OF URINE CREATININE: CPT

## 2024-08-27 PROCEDURE — 4004F PT TOBACCO SCREEN RCVD TLK: CPT | Performed by: FAMILY MEDICINE

## 2024-08-27 PROCEDURE — G0439 PPPS, SUBSEQ VISIT: HCPCS | Performed by: FAMILY MEDICINE

## 2024-08-27 PROCEDURE — 85025 COMPLETE CBC W/AUTO DIFF WBC: CPT

## 2024-08-27 PROCEDURE — 1160F RVW MEDS BY RX/DR IN RCRD: CPT | Performed by: FAMILY MEDICINE

## 2024-08-27 PROCEDURE — G0446 INTENS BEHAVE THER CARDIO DX: HCPCS | Performed by: FAMILY MEDICINE

## 2024-08-27 PROCEDURE — 83036 HEMOGLOBIN GLYCOSYLATED A1C: CPT

## 2024-08-27 PROCEDURE — 99497 ADVNCD CARE PLAN 30 MIN: CPT | Performed by: FAMILY MEDICINE

## 2024-08-27 PROCEDURE — 99397 PER PM REEVAL EST PAT 65+ YR: CPT | Performed by: FAMILY MEDICINE

## 2024-08-27 PROCEDURE — 1159F MED LIST DOCD IN RCRD: CPT | Performed by: FAMILY MEDICINE

## 2024-08-27 PROCEDURE — 3008F BODY MASS INDEX DOCD: CPT | Performed by: FAMILY MEDICINE

## 2024-08-27 RX ORDER — LANCETS 33 GAUGE
EACH MISCELLANEOUS
COMMUNITY
Start: 2024-07-09

## 2024-08-27 ASSESSMENT — ACTIVITIES OF DAILY LIVING (ADL)
DOING_HOUSEWORK: INDEPENDENT
DRESSING: INDEPENDENT
TAKING_MEDICATION: INDEPENDENT
GROCERY_SHOPPING: INDEPENDENT
MANAGING_FINANCES: INDEPENDENT
BATHING: INDEPENDENT

## 2024-08-27 ASSESSMENT — PATIENT HEALTH QUESTIONNAIRE - PHQ9
8. MOVING OR SPEAKING SO SLOWLY THAT OTHER PEOPLE COULD HAVE NOTICED. OR THE OPPOSITE, BEING SO FIGETY OR RESTLESS THAT YOU HAVE BEEN MOVING AROUND A LOT MORE THAN USUAL: SEVERAL DAYS
10. IF YOU CHECKED OFF ANY PROBLEMS, HOW DIFFICULT HAVE THESE PROBLEMS MADE IT FOR YOU TO DO YOUR WORK, TAKE CARE OF THINGS AT HOME, OR GET ALONG WITH OTHER PEOPLE: SOMEWHAT DIFFICULT
6. FEELING BAD ABOUT YOURSELF - OR THAT YOU ARE A FAILURE OR HAVE LET YOURSELF OR YOUR FAMILY DOWN: NEARLY EVERY DAY
SUM OF ALL RESPONSES TO PHQ9 QUESTIONS 1 AND 2: 6
2. FEELING DOWN, DEPRESSED OR HOPELESS: NEARLY EVERY DAY
9. THOUGHTS THAT YOU WOULD BE BETTER OFF DEAD, OR OF HURTING YOURSELF: NOT AT ALL
5. POOR APPETITE OR OVEREATING: NEARLY EVERY DAY
4. FEELING TIRED OR HAVING LITTLE ENERGY: NEARLY EVERY DAY
SUM OF ALL RESPONSES TO PHQ QUESTIONS 1-9: 21
7. TROUBLE CONCENTRATING ON THINGS, SUCH AS READING THE NEWSPAPER OR WATCHING TELEVISION: MORE THAN HALF THE DAYS
1. LITTLE INTEREST OR PLEASURE IN DOING THINGS: NEARLY EVERY DAY
3. TROUBLE FALLING OR STAYING ASLEEP OR SLEEPING TOO MUCH: NEARLY EVERY DAY

## 2024-08-27 ASSESSMENT — ENCOUNTER SYMPTOMS
OCCASIONAL FEELINGS OF UNSTEADINESS: 0
DEPRESSION: 1
LOSS OF SENSATION IN FEET: 0

## 2024-08-27 NOTE — PROGRESS NOTES
Covid vax: x 4  Flu: n/a  Pneumo: UTD  RSV: advised  Shingles: advised    CRC: due-ordered  Mammogram: declined  Pap: declined  Lmp: n/a

## 2024-08-27 NOTE — PROGRESS NOTES
Subjective   Reason for Visit: Cortney Ruby is a 63 y.o. female here for a Medicare Wellness visit.   Covid vax: x 4  Flu: n/a  Pneumo: UTD  RSV: advised  Shingles: advised     CRC: due-ordered  Mammogram: declined  Pap: declined  Lmp: n/a  Hba1c 8.9  Sugars <200    CHECKLIST REVIEWED AND COMPLETE FOR [facility] Medicare Annual Wellness Visit Subsequent, Diabetes, Hypertension, Hyperlipidemia, and Hand Pain (Left hand--3rd digit will not close)  ---------------------------------------------------------------------------------------------  Past Medical, Surgical, and Family History reviewed and updated in chart.    Reviewed all medications by prescribing practitioner or clinical pharmacist (such as prescriptions, OTCs, herbal therapies and supplements) and documented in the medical record.    HPI    Patient Self Assessment of Health Status  Patient Self Assessment: Good    Nutrition and Exercise:    Current Diet: HEALTHY Diet always best, minimizing excess carbs,   weight reduction advised if BMI not WNL, please maintain a NORMAL BMI 18.5-24.9    Adequate Fluid Intake: Yes  Caffeine: -aware to minimize intake, <300mg best to even take in LESS  Exercise Frequency: Regularly advised, weight bearing, strengthening, aerobic    Functional Ability/Level of Safety:  Home safety addressed: no active new concerns,   fall risk addressed  NO new hearing issues or concerns  [place] in ADLs addressed and areas of assistance if present -noted    ANY Cognitive Impairment Observed: No cognitive impairment observed    Home Safety Risk Factors: None  --------------------------------------------------------------------------------------------  Patient Care Team:  Mayra Lewis MD as PCP - General    HPI  Patient Active Problem List   Diagnosis    Actinic keratosis    Bilateral calcaneal spurs    Carpal tunnel syndrome    Cubital tunnel syndrome on left    Current smoker    Cervical radiculopathy    DJD (degenerative joint  "disease), cervical    Essential hypertension    First degree AV block    Gall stones    Generalized arthritis    Hematuria of unknown etiology    Mixed hyperlipidemia    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HHS-HCC)    Prolonged QT interval    Morbid obesity (Multi)    Right knee DJD    Type 2 diabetes mellitus with hyperglycemia, with long-term current use of insulin (Multi)    Chronic obstructive pulmonary disease (Multi)    Tubular adenoma of colon    Colon cancer screening    Cheek mass    Mass of soft tissue of elbow      Past Surgical History:   Procedure Laterality Date    CERVICAL FUSION       SECTION, CLASSIC  1977    ,     COLONOSCOPY  2018    adenoma-due     CYST REMOVAL      Left Cheek & Right [place]obe    CYSTOSCOPY      Dr. Sanchez-unsure date    LUMBAR DISCECTOMY      Dr. Clay    US ASPIRATION INJECTION INTERMEDIATE JOINT  2021    US ASPIRATION INJECTION INTERMEDIATE JOINT 2021 [facility]    XR CHEST PACEMAKER WITH FLUORO  2023    XR CHEST PACEMAKER WITH FLUORO 2023 [facility]         PHQ2(+) No active depressed mood or not in crisis, 5min. spent in discussion.    Review of Systems:    NO Seizures  NO CAD  NO CVA  Sad often  No hi/si  Not in crisis grandkids and one on way 4/ and 3/ and one pregnant    This patient has   NO history of recent Covid nor flu symptoms,  NO Fever nor chills,  NO Chest pain, shortness of breath nor paroxysmal nocturnal dyspnea,  NO Nausea, vomiting, nor diarrhea,  NO Hematochezia nor melena,  NO Dysuria, hematuria, nor new incontinence issues  NO new severe headaches nor neurological complaints,  NO new issues with anxiety nor depression nor new psychiatric complaints,  NO suicidal nor homicidal ideations.  ---------------------------------------------------------------------------------------------   OBJECTIVE:  /66   Pulse 80   Temp 36.2 °C (97.1 °F) (Temporal)   Resp 16   Ht 1.651 m (5' 5\")   Wt " 103 kg (226 lb)   SpO2 98%   BMI 37.61 kg/m²      General:  alert, oriented, no acute distress.  No obvious skin rashes noted.   No gait disturbance noted.    Mood is pleasant, not tearful, no signs of emotional distress.  Not appearing intoxicated or altered.   No voiced delusions,   Normal, appropriate behavior.    HEENT: Normocephalic, atraumatic,   Pupils round, reactive to light  Extraocular motions intact and wnl  Tympanic membranes normal    Neck: no nuchal rigidity  No masses palpable.  No carotid bruits.  No thyromegaly.    Respiratory: Equal breath sounds  No wheezes,    rales,    nor rhonchi  No respiratory distress.    Heart: Regular rate and rhythm, no    murmurs  no rubs/gallops    Abdomen: no masses palpable, no rebound nor guarding, no rebound nor guarding.overwt    Extremities: NO cyanosis noted, no clubbing.   No edema noted.  2+dorsalis pedis pulses.    Normal-not antalgic, steady gait.  Denies foot lesions  exam wnl no lesions  Trigger finger 3rd digit on L    No visits with results within 3 Month(s) from this visit.   Latest known visit with results is:   Admission on 10/31/2023, Discharged on 10/31/2023   Component Date Value Ref Range Status    POCT Glucose 10/31/2023 128 (H)  74 - 99 mg/dL Final    Case Report 10/31/2023    Final                    Value:Surgical Pathology                                Case: [id number]                                  Authorizing Provider:  Roland J Reyes, MD         Collected:           10/31/2023 1140              Ordering Location:     [facility]   Received:            10/31/2023 1239                                     OR                                                                           Pathologist:           Allen Ruano MD                                                           Specimens:   A) - SOFT TISSUE MASS RESECTION, LEFT ELBOW MASS                                                    B) - SOFT TISSUE MASS RESECTION,  LEFT CHEEK MASS                                           FINAL DIAGNOSIS 10/31/2023    Final                    Value:A. LEFT ELBOW MASS RESECTION:                           Epidermal inclusion cyst.                                                    B. LEFT CHEEK MASS RESECTION:                           Epidermal inclusion cyst.      10/31/2023    Final                    Value:By the signature on this report, the individual or group listed as making                           the Final Interpretation/Diagnosis certifies that they have reviewed this                           case.     Clinical History 10/31/2023    Final                    Value:Pre-op diagnosis:                          R22.0  M79.89    Gross Description 10/31/2023    Final                    Value: A: Received in formalin, labeled with the patient´s name and hospital                           number, is an intact cyst  measuring 2.0 x 1.6 x 1.2 cm.  The specimen is                           inked blue and sectioned to reveal gray-white pasty material.  A                           representative section is submitted in one cassette.                          ALT                           B: Received in formalin, labeled with the patient's name and hospital                           number, is an unoriented elliptical segment of skin, and subcutaneous                           tissue measuring 1.7 x 0.4 x 1.0 cm.  The specimen is inked blue and                           sectioned to reveal gray-white pasty material.  A representative section                           is submitted in one cassette.                          ALT        Assessment/Plan     Problem List Items Addressed This Visit       Essential hypertension    Relevant Medications    semaglutide 0.25 mg or 0.5 mg (2 mg/3 mL) pen injector (Start on 9/1/2024)    Other Relevant Orders    CBC and Auto Differential    Comprehensive Metabolic Panel    Hemoglobin A1C    Lipid Panel    Thyroid  Stimulating Hormone    Thyroxine, Free    Lipid Panel    Hemoglobin A1C    CBC and Auto Differential    Comprehensive Metabolic Panel    Thyroid Stimulating Hormone    Thyroxine, Free    Albumin-Creatinine Ratio, Urine Random    Generalized arthritis    Relevant Medications    semaglutide 0.25 mg or 0.5 mg (2 mg/3 mL) pen injector (Start on 9/1/2024)    Other Relevant Orders    CBC and Auto Differential    Comprehensive Metabolic Panel    Hemoglobin A1C    Lipid Panel    Thyroid Stimulating Hormone    Thyroxine, Free    Lipid Panel    Hemoglobin A1C    CBC and Auto Differential    Comprehensive Metabolic Panel    Thyroid Stimulating Hormone    Thyroxine, Free    Albumin-Creatinine Ratio, Urine Random    Mixed hyperlipidemia    Relevant Medications    semaglutide 0.25 mg or 0.5 mg (2 mg/3 mL) pen injector (Start on 9/1/2024)    Other Relevant Orders    CBC and Auto Differential    Comprehensive Metabolic Panel    Hemoglobin A1C    Lipid Panel    Thyroid Stimulating Hormone    Thyroxine, Free    Lipid Panel    Hemoglobin A1C    CBC and Auto Differential    Comprehensive Metabolic Panel    Thyroid Stimulating Hormone    Thyroxine, Free    Albumin-Creatinine Ratio, Urine Random    Morbid obesity (Multi)    Relevant Medications    semaglutide 0.25 mg or 0.5 mg (2 mg/3 mL) pen injector (Start on 9/1/2024)    Other Relevant Orders    CBC and Auto Differential    Comprehensive Metabolic Panel    Hemoglobin A1C    Lipid Panel    Thyroid Stimulating Hormone    Thyroxine, Free    Lipid Panel    Hemoglobin A1C    CBC and Auto Differential    Comprehensive Metabolic Panel    Thyroid Stimulating Hormone    Thyroxine, Free    Albumin-Creatinine Ratio, Urine Random    Type 2 diabetes mellitus with hyperglycemia, with long-term current use of insulin (Multi)    Relevant Medications    semaglutide 0.25 mg or 0.5 mg (2 mg/3 mL) pen injector (Start on 9/1/2024)    Other Relevant Orders    CBC and Auto Differential    Comprehensive  Metabolic Panel    Hemoglobin A1C    Lipid Panel    Thyroid Stimulating Hormone    Thyroxine, Free    Lipid Panel    Hemoglobin A1C    CBC and Auto Differential    Comprehensive Metabolic Panel    Thyroid Stimulating Hormone    Thyroxine, Free    Albumin-Creatinine Ratio, Urine Random    Follow Up In Advanced Primary Care - Behavioral Health Collaborative Care [facility]    Chronic obstructive pulmonary disease (Multi)    Relevant Medications    semaglutide 0.25 mg or 0.5 mg (2 mg/3 mL) pen injector (Start on 9/1/2024)    Other Relevant Orders    CBC and Auto Differential    Comprehensive Metabolic Panel    Hemoglobin A1C    Lipid Panel    Thyroid Stimulating Hormone    Thyroxine, Free    Lipid Panel    Hemoglobin A1C    CBC and Auto Differential    Comprehensive Metabolic Panel    Thyroid Stimulating Hormone    Thyroxine, Free    Albumin-Creatinine Ratio, Urine Random    Colon cancer screening    Relevant Medications    semaglutide 0.25 mg or 0.5 mg (2 mg/3 mL) pen injector (Start on 9/1/2024)    Other Relevant Orders    Colonoscopy Screening; Average Risk Patient    CBC and Auto Differential    Comprehensive Metabolic Panel    Hemoglobin A1C    Lipid Panel    Thyroid Stimulating Hormone    Thyroxine, Free    Lipid Panel    Hemoglobin A1C    CBC and Auto Differential    Comprehensive Metabolic Panel    Thyroid Stimulating Hormone    Thyroxine, Free    Albumin-Creatinine Ratio, Urine Random    Cologuard® colon cancer screening     Other Visit Diagnoses       Current moderate episode of major depressive disorder, unspecified whether recurrent (Multi)    -  Primary    Relevant Orders    Follow Up In Advanced Primary Care - Behavioral Health Collaborative Care [facility]    Encounter for Medicare annual wellness exam        Relevant Medications    semaglutide 0.25 mg or 0.5 mg (2 mg/3 mL) pen injector (Start on 9/1/2024)    Other Relevant Orders    CBC and Auto Differential    Comprehensive Metabolic Panel    Hemoglobin A1C     Lipid Panel    Thyroid Stimulating Hormone    Thyroxine, Free    Lipid Panel    Hemoglobin A1C    CBC and Auto Differential    Comprehensive Metabolic Panel    Thyroid Stimulating Hormone    Thyroxine, Free    Albumin-Creatinine Ratio, Urine Random    Need for vaccination        Relevant Medications    semaglutide 0.25 mg or 0.5 mg (2 mg/3 mL) pen injector (Start on 9/1/2024)    Other Relevant Orders    Pneumococcal conjugate vaccine, 20-valent (PREVNAR 20) (Completed)    CBC and Auto Differential    Comprehensive Metabolic Panel    Hemoglobin A1C    Lipid Panel    Thyroid Stimulating Hormone    Thyroxine, Free    Lipid Panel    Hemoglobin A1C    CBC and Auto Differential    Comprehensive Metabolic Panel    Thyroid Stimulating Hormone    Thyroxine, Free    Albumin-Creatinine Ratio, Urine Random    Cigarette nicotine dependence without complication        Relevant Orders    CT lung screening low dose    Trigger middle finger of left hand        Relevant Orders    Referral to Orthopaedic Surgery          Advance Care Planning Note   Discussion Date: 08/27/24   Discussion Participants: patient  16 min spent discussing ACP w pt    The patient wishes to discuss Advance Care Planning today and the following is a brief summary of our discussion.     Patient has capacity to make their own medical decisions: Yes  Health Care Agent/Surrogate Decision Maker documented in chart: Yes    Documents on file and valid:  Advance Directive/Living Will: advised today  Health Care Power of : advised today  Communication of Medical Status/Prognosis:   yes   Communication of Treatment Goals/Options:   yes  Treatment Decisions/involved with patient today  yes  Time Statement: Total face to face time spent on advance care planning was <30 minutes with <30 minutes spent in counseling, including the explanation.    SEE ME AT NEXT REGULARLY SCHEDULED VISIT-sooner if condition deteriorates or new problems arise.    PATIENT WOULD LIKE TO  BE A FULL NO CODE    NO uncontrolled DEPRESSION noted  Assessed and reviewed for opioid use.  NO EVIDENCE OF SIGNIFICANT DEMENTIA    Signs and symptoms of concern with depression-if in crisis -(no current HI/SI) will let us know and proceed to ER   Signs/symptoms of concern with dementia-and need to contact us if they occur discussed.    ASCVD   24.4 %   addressed and risk reduction conversation took place    All above counseling 15 minutes in conversation/documentation etc    Mood counseling 5min- no uncontrolled depression, good support system, has crisis plan if occurs.  Included BMI counseling and options if BMI>30        QUESTIONS answered  Offered hand surgeon    Agrees to [facility]ARNA  I have discussed the collaborative care model for this patient’s behavioral health care. Written detailed information and identifying the members of this care team was provided to patient. They give permission for the Behavioral Health Manager (BHM) and psychiatric consultant to be included in their care with my continued primary management. Patient made aware that services provided as part of the Collaborative Care Model are subject to cost sharing.  And again had a lengthy discussion w pt  about risks of poorly controlled diabetes including micro and macrovascular complications of DM2 including blindness,MI,CVA and death among other possibilities. Pt aware and agrees to better -or if good control-continued compliance and adherance to instructions such as regular eye exams q 1-2 y, foot exams,and f/u regularly for hba1c with a goal of 6.5.    Follow up as planned for hba1c and BP checks REGULARLY.      Next visit addressed -regular visit as scheduled and follow up sooner if condition deterioration or new problems arise.    This completes Cortney Ruby ANNUAL MEDICARE WELLNESS VISIT today.  If no other follow ups discussed: Please follow up in 1 year for NEXT ANNUAL MEDICARE WELLNESS VISIT.    Mayra Lewis MD    This  documentation is subject to inadvertent typing and other similar clerical/grammatic errors etc.

## 2024-08-28 ENCOUNTER — TELEMEDICINE (OUTPATIENT)
Dept: PHARMACY | Facility: HOSPITAL | Age: 64
End: 2024-08-28
Payer: COMMERCIAL

## 2024-08-28 ENCOUNTER — TELEPHONE (OUTPATIENT)
Dept: PRIMARY CARE | Facility: CLINIC | Age: 64
End: 2024-08-28

## 2024-08-28 DIAGNOSIS — E11.65 TYPE 2 DIABETES MELLITUS WITH HYPERGLYCEMIA, WITH LONG-TERM CURRENT USE OF INSULIN (MULTI): ICD-10-CM

## 2024-08-28 DIAGNOSIS — M19.90 GENERALIZED ARTHRITIS: ICD-10-CM

## 2024-08-28 DIAGNOSIS — E11.9 TYPE 2 DIABETES MELLITUS WITHOUT COMPLICATION, WITH LONG-TERM CURRENT USE OF INSULIN (MULTI): ICD-10-CM

## 2024-08-28 DIAGNOSIS — Z00.00 ENCOUNTER FOR MEDICARE ANNUAL WELLNESS EXAM: ICD-10-CM

## 2024-08-28 DIAGNOSIS — Z23 NEED FOR VACCINATION: ICD-10-CM

## 2024-08-28 DIAGNOSIS — J44.9 CHRONIC OBSTRUCTIVE PULMONARY DISEASE, UNSPECIFIED COPD TYPE (MULTI): ICD-10-CM

## 2024-08-28 DIAGNOSIS — E66.01 MORBID OBESITY (MULTI): ICD-10-CM

## 2024-08-28 DIAGNOSIS — Z79.4 TYPE 2 DIABETES MELLITUS WITH HYPERGLYCEMIA, WITH LONG-TERM CURRENT USE OF INSULIN (MULTI): ICD-10-CM

## 2024-08-28 DIAGNOSIS — E78.2 MIXED HYPERLIPIDEMIA: ICD-10-CM

## 2024-08-28 DIAGNOSIS — Z79.4 TYPE 2 DIABETES MELLITUS WITHOUT COMPLICATION, WITH LONG-TERM CURRENT USE OF INSULIN (MULTI): ICD-10-CM

## 2024-08-28 DIAGNOSIS — I10 ESSENTIAL HYPERTENSION: ICD-10-CM

## 2024-08-28 DIAGNOSIS — Z79.4 TYPE 2 DIABETES MELLITUS WITH HYPERGLYCEMIA, WITH LONG-TERM CURRENT USE OF INSULIN (MULTI): Primary | ICD-10-CM

## 2024-08-28 DIAGNOSIS — Z12.11 COLON CANCER SCREENING: ICD-10-CM

## 2024-08-28 DIAGNOSIS — E11.65 TYPE 2 DIABETES MELLITUS WITH HYPERGLYCEMIA, WITH LONG-TERM CURRENT USE OF INSULIN (MULTI): Primary | ICD-10-CM

## 2024-08-28 LAB
EST. AVERAGE GLUCOSE BLD GHB EST-MCNC: 220 MG/DL
HBA1C MFR BLD: 9.3 %

## 2024-08-28 NOTE — PROGRESS NOTES
"  Clinical Pharmacy Appointment    Patient ID: Cortney Ruby is a 63 y.o. female who presents for Diabetes.    Pt is here for First appointment.     Referring Provider: Mayra Lewis, *  PCP: Mayra Lewis MD   Last visit with PCP: 8.28.24   Next visit with PCP: 12.13.24    Subjective   HPI  DIABETES MELLITUS TYPE 2:    Diagnosed with diabetes: > 5 years ago. Known diabetic complications: smoker, HTN, HLD, obesity, COPD.  Does patient follow with Endocrinology: Yes, Dr. Kyle Villalta  Last optometry exam: Not assessed today  Most recent visit in Podiatry: Not assessed today      Current diabetic medications include:  Humalog Mix 75-25 - 50 units subcutaneously BID    Glucose Readings:  Glucometer/CGM Type: glucometer  Patient reports she has not been checking blood glucose recently    Current home BG readings: None provided today    Any episodes of hypoglycemia? No, patient denies .  Did patient treat episode of hypoglycemia appropriately? N/A  Does pt have proteinuria? No - UACr 11.3 on 8.27.24    Lifestyle:  Diet: Patient reports she recently lost her mom, and has been depressed, therefore has been eating more/making unhealthy diet choices    Physical Activity: Limited    Secondary Prevention:  Statin? Yes - atorvastatin 40 mg daily  ACE-I/ARB? No  Aspirin? No    Pertinent PMH Review:  PMH of Pancreatitis: No  PMH of Retinopathy: No  PMH of Urinary Tract Infections: No  PMH of MTC: No    Drug Interactions  No relevant drug interactions were noted.      Objective   Allergies   Allergen Reactions    Norco [Hydrocodone-Acetaminophen] Nausea/vomiting     Social History     Social History Narrative    Not on file      Medication Review  Current Outpatient Medications   Medication Instructions    amLODIPine (NORVASC) 2.5 mg, oral, Daily    atorvastatin (LIPITOR) 40 mg, oral, Daily    BD Ultra-Fine Micro Pen Needle 32 gauge x 1/4\" needle USE TWICE DAILY    Combivent Respimat  mcg/actuation " inhaler 1 puff, inhalation, 4 times daily RT    fluticasone-umeclidin-vilanter (Trelegy Ellipta) 100-62.5-25 mcg blister with device 1 puff, inhalation, Daily    HumaLOG Mix 75-25 KwikPen 100 unit/mL (75-25) injection INJECT 50 UNIT Twice daily    ibuprofen 800 mg, oral, Every 8 hours PRN    OneTouch Delica Plus Lancet 33 gauge misc USE DAILY    OneTouch Ultra Test strip TEST BID AND PRN    [START ON 9/1/2024] semaglutide 0.25 mg, subcutaneous, Once Weekly      Vitals  BP Readings from Last 2 Encounters:   08/27/24 126/66   11/08/23 138/82     BMI Readings from Last 1 Encounters:   08/27/24 37.61 kg/m²      Labs  A1C  Lab Results   Component Value Date    HGBA1C 9.3 (H) 08/27/2024    HGBA1C 8.9 (H) 05/24/2024    HGBA1C 8.7 (H) 11/08/2023     BMP  Lab Results   Component Value Date    CALCIUM 9.2 08/27/2024     08/27/2024    K 3.7 08/27/2024    CO2 25 08/27/2024     08/27/2024    BUN 9 08/27/2024    CREATININE 0.61 08/27/2024    EGFR >90 08/27/2024     LFTs  Lab Results   Component Value Date    ALT 24 08/27/2024    AST 18 08/27/2024    ALKPHOS 112 08/27/2024    BILITOT 0.5 08/27/2024     FLP  Lab Results   Component Value Date    TRIG 162 (H) 08/27/2024    CHOL 153 08/27/2024    LDLF 62 01/11/2022    LDLCALC 63 08/27/2024    HDL 57.2 08/27/2024     Urine Microalbumin  Lab Results   Component Value Date    MICROALBCREA 11.3 08/27/2024     Weight Management  Wt Readings from Last 3 Encounters:   08/27/24 103 kg (226 lb)   11/22/23 104 kg (230 lb)   11/08/23 104 kg (230 lb)      There is no height or weight on file to calculate BMI.     Assessment/Plan   Problem List Items Addressed This Visit       Type 2 diabetes mellitus with hyperglycemia, with long-term current use of insulin (Multi)     Patient's goal A1c is < 7%.  Is pt at goal? No, 9.3% on 8.27.24  Patient's SMBGs are likely above goal based on most recent A1c    Rationale for plan: Will plan to start Ozempic. Patient confirms medication is too  costly, and was unable to  from pharmacy. Will attempt  patient assistance program to assist with cost.    Medication Changes:  CONTINUE:  Humalog Mix 75-25 - 50 units subcutaneously BID  START  Ozempic 0.25 mg subcutaneously x2 weeks, then increase to 0.5 mg weekly thereafter. Prescription sent to [facility] pharmacy for  PAP.    Future Considerations:  Titrate GLP1  Start SGLT2i  Consider ACEi for renoprotective properties  Decrease insulin dosage    Monitoring and Education:   Patient Assistance Screening (VAF)  Patient verbally reports monthly or yearly income which is less than 400% federal poverty level  Application for program has been submitted for the following medications:   Ozempic  Patient aware this process may take up to 2 weeks once income documents have been sent to the team.  If approved, medication must be filled through [facility] pharmacy and may be picked up or mailed to patient.   If approved, medication will be billed through insurance, and patient assistance team will pay the copay. This will result in a $0 copay for the patient.  Counseled patient on mechanism of action, side effects, contraindications, and what to do if the patient misses a dose. All patients questions were answered.              Other Visit Diagnoses       Type 2 diabetes mellitus without complication, with long-term current use of insulin (Multi)              Clinical Pharmacist follow-up: 9/11 @ 12 pm, Telehealth visit    Continue all meds under the continuation of care with the referring provider and clinical pharmacy team.    Thank you,  Luz Sharp, PharmD  Clinical Pharmacist  [phone]    Verbal consent to manage patient's drug therapy was obtained from the patient. They were informed they may decline to participate or withdraw from participation in pharmacy services at any time.

## 2024-08-28 NOTE — TELEPHONE ENCOUNTER
----- Message from Mayra Lewis sent at 8/28/2024 11:57 AM EDT -----  Refer pharm 'dr rivers  ----- Message -----  From: Luz Sharp PharmINDIRA  Sent: 8/28/2024   8:45 AM EDT  To: Mayra Lewis MD    Sounds good! Ok for referral? Patient does see endo but it does not look like they made changes at last visit and A1c >9%.  ----- Message -----  From: Mayra Lewis MD  Sent: 8/27/2024   2:32 PM EDT  To: Luz Sharp, PharmINDIRA    Wants [facility]  Dr rivers

## 2024-08-28 NOTE — ASSESSMENT & PLAN NOTE
Patient's goal A1c is < 7%.  Is pt at goal? No, 9.3% on 8.27.24  Patient's SMBGs are likely above goal based on most recent A1c    Rationale for plan: Will plan to start Ozempic. Patient confirms medication is too costly, and was unable to  from pharmacy. Will attempt  patient assistance program to assist with cost.    Medication Changes:  CONTINUE:  Humalog Mix 75-25 - 50 units subcutaneously BID  START  Ozempic 0.25 mg subcutaneously x2 weeks, then increase to 0.5 mg weekly thereafter. Prescription sent to [facility] pharmacy for  PAP.    Future Considerations:  Titrate GLP1  Start SGLT2i  Consider ACEi for renoprotective properties  Decrease insulin dosage    Monitoring and Education:   Patient Assistance Screening (VAF)  Patient verbally reports monthly or yearly income which is less than 400% federal poverty level  Application for program has been submitted for the following medications:   Ozempic  Patient aware this process may take up to 2 weeks once income documents have been sent to the team.  If approved, medication must be filled through [facility] pharmacy and may be picked up or mailed to patient.   If approved, medication will be billed through insurance, and patient assistance team will pay the copay. This will result in a $0 copay for the patient.  Counseled patient on mechanism of action, side effects, contraindications, and what to do if the patient misses a dose. All patients questions were answered.

## 2024-09-03 ENCOUNTER — APPOINTMENT (OUTPATIENT)
Dept: ORTHOPEDIC SURGERY | Facility: CLINIC | Age: 64
End: 2024-09-03
Payer: COMMERCIAL

## 2024-09-03 DIAGNOSIS — M65.332 TRIGGER MIDDLE FINGER OF LEFT HAND: ICD-10-CM

## 2024-09-03 PROCEDURE — 3061F NEG MICROALBUMINURIA REV: CPT | Performed by: ORTHOPAEDIC SURGERY

## 2024-09-03 PROCEDURE — 99214 OFFICE O/P EST MOD 30 MIN: CPT | Performed by: ORTHOPAEDIC SURGERY

## 2024-09-03 PROCEDURE — 3046F HEMOGLOBIN A1C LEVEL >9.0%: CPT | Performed by: ORTHOPAEDIC SURGERY

## 2024-09-03 PROCEDURE — RXMED WILLOW AMBULATORY MEDICATION CHARGE

## 2024-09-03 PROCEDURE — 3048F LDL-C <100 MG/DL: CPT | Performed by: ORTHOPAEDIC SURGERY

## 2024-09-03 NOTE — PROGRESS NOTES
"  History of Present Illness   Left hand middle finger trigger finger     Review of Systems   GENERAL: Negative for malaise, significant weight loss, fever  MUSCULOSKELETAL: see HPI  NEURO:  Negative     Past Medical History:   Diagnosis Date    Arthritis     Closed fracture of lateral portion of right tibial plateau     COPD (chronic obstructive pulmonary disease) (Multi)     Diabetes mellitus (Multi)     History of mammogram 07/2016    cat 2    Hypertension     Internal derangement of knee     Mammogram declined     Pap smear of cervix declined     Pap test, as part of routine gynecological examination 05/2015    wnl, hpv not done    Peripheral vascular disease of foot (CMS-HCC)     Respiratory failure (Multi)     Vertigo         Medication Documentation Review Audit       Reviewed by Mayra Lewis MD (Physician) on 08/27/24 at 1429      Medication Order Taking? Sig Documenting Provider Last Dose Status   amLODIPine (Norvasc) 2.5 mg tablet [id number] Yes Take 1 tablet (2.5 mg) by mouth once daily. Mayra Lewis MD Taking Active   atorvastatin (Lipitor) 40 mg tablet [id number] Yes Take 1 tablet (40 mg) by mouth once daily. Mayra Lewis MD Taking Active   BD Ultra-Fine Micro Pen Needle 32 gauge x 1/4\" needle [id number] Yes USE TWICE DAILY Historical Provider, MD Taking Active     Discontinued 08/27/24 1349   Combivent Respimat  mcg/actuation inhaler [id number] Yes Inhale 1 puff 4 times a day. Mayra Lewis MD Taking Active   fluticasone-umeclidin-vilanter (Trelegy Ellipta) 100-62.5-25 mcg blister with device [id number] Yes Inhale 1 puff once daily. Mayra Lewis MD Taking Active   HumaLOG Mix 75-25 KwikPen 100 unit/mL (75-25) injection [id number] Yes INJECT 50 UNIT Twice daily Historical Provider, MD Taking Active   ibuprofen 800 mg tablet [id number] Yes Take 1 tablet (800 mg) by mouth every 8 hours if needed (pain). Mayra Lewis MD Taking Active   OneTouch Delica Plus " "Lancet 33 gauge misc [id number] Yes USE DAILY Historical Provider, MD Taking Active   OneTouch Ultra Test strip [id number] Yes TEST BID AND PRN Historical Provider, MD Taking Active   semaglutide 0.25 mg or 0.5 mg (2 mg/3 mL) pen injector [id number]  Inject 0.25 mg under the skin 1 (one) time per week. Mayra Lewis MD  Active                     Physical Exam  Left hand  The skin is intact about the left hand, no erythema or warmth  There is tenderness to palpation over the A1 pulley of the left long finger  She demonstrates triggering when flexing at the PIP joint  Radial pulse is 2+ and palpable     Imaging  ECG 12 lead  Sinus rhythm with 1st degree AV block  Possible Left atrial enlargement  Borderline ECG  When compared with ECG of 02-AUG-2002 09:30,  No significant change was found  Confirmed by Regulo Pollard ([id number]) on 10/26/2023 9:27:42 PM       Assessment   Left long trigger finger     Plan  Left long trigger finger release.  Procedure was explained along the risks, benefits and alternatives being reviewed.  All questions were answered.  Will schedule her then as an outpatient under local anesthetic in the near future.    Allergies   Allergen Reactions    Norco [Hydrocodone-Acetaminophen] Nausea/vomiting       REVIEW OF SYSTEMS  General: Negative for malaise, significant weight loss, fever  Musculoskeletal: See HPI  Neuro:  Negative for numbness/tingling      Medication Documentation Review Audit       Reviewed by Mayra Lewis MD (Physician) on 08/27/24 at 1429      Medication Order Taking? Sig Documenting Provider Last Dose Status   amLODIPine (Norvasc) 2.5 mg tablet [id number] Yes Take 1 tablet (2.5 mg) by mouth once daily. Mayra Lewis MD Taking Active   atorvastatin (Lipitor) 40 mg tablet [id number] Yes Take 1 tablet (40 mg) by mouth once daily. Mayra Lewis MD Taking Active   BD Ultra-Fine Micro Pen Needle 32 gauge x 1/4\" needle [id number] Yes USE TWICE DAILY Historical " Provider, MD Taking Active     Discontinued 08/27/24 1349   Combivent Respimat  mcg/actuation inhaler [id number] Yes Inhale 1 puff 4 times a day. Mayra Lewis MD Taking Active   fluticasone-umeclidin-vilanter (Trelegy Ellipta) 100-62.5-25 mcg blister with device [id number] Yes Inhale 1 puff once daily. Mayra Lewis MD Taking Active   HumaLOG Mix 75-25 KwikPen 100 unit/mL (75-25) injection [id number] Yes INJECT 50 UNIT Twice daily Historical Provider, MD Taking Active   ibuprofen 800 mg tablet [id number] Yes Take 1 tablet (800 mg) by mouth every 8 hours if needed (pain). Mayra Lewis MD Taking Active   OneTouch Delica Plus Lancet 33 gauge misc [id number] Yes USE DAILY Historical Provider, MD Taking Active   OneTouch Ultra Test strip [id number] Yes TEST BID AND PRN Historical Provider, MD Taking Active   semaglutide 0.25 mg or 0.5 mg (2 mg/3 mL) pen injector [id number]  Inject 0.25 mg under the skin 1 (one) time per week. Mayra Lewis MD  Active                    HPI:  Catching and triggering of left hand middle finger    PHYSICAL EXAM  General Appearance/Mental Status: A&Ox3, no apparent distress  HEENT: Normal  Lungs: Clear  Heart: RRR  Abdomen: Soft, nontender  Extremities: Abnormal palpable nodule at the A1 pulley of the left hand middle finger    Assessment:  Left hand middle finger trigger    Plan:  Left hand middle finger trigger release  All medications and allergies reviewed  All questions answered, she does not want any pain medication after Surgery and only wants to use tylenol and ibuprofen

## 2024-09-07 ENCOUNTER — PHARMACY VISIT (OUTPATIENT)
Dept: PHARMACY | Facility: CLINIC | Age: 64
End: 2024-09-07
Payer: MEDICARE

## 2024-09-10 PROBLEM — M65.332 TRIGGER FINGER, LEFT MIDDLE FINGER: Status: ACTIVE | Noted: 2024-09-10

## 2024-09-11 ENCOUNTER — APPOINTMENT (OUTPATIENT)
Dept: PHARMACY | Facility: HOSPITAL | Age: 64
End: 2024-09-11
Payer: COMMERCIAL

## 2024-09-27 ENCOUNTER — APPOINTMENT (OUTPATIENT)
Dept: PHARMACY | Facility: HOSPITAL | Age: 64
End: 2024-09-27
Payer: COMMERCIAL

## 2024-09-27 DIAGNOSIS — J44.9 CHRONIC OBSTRUCTIVE PULMONARY DISEASE, UNSPECIFIED COPD TYPE (MULTI): ICD-10-CM

## 2024-09-27 DIAGNOSIS — E66.01 MORBID OBESITY (MULTI): ICD-10-CM

## 2024-09-27 DIAGNOSIS — Z79.4 TYPE 2 DIABETES MELLITUS WITH HYPERGLYCEMIA, WITH LONG-TERM CURRENT USE OF INSULIN: ICD-10-CM

## 2024-09-27 DIAGNOSIS — E11.65 TYPE 2 DIABETES MELLITUS WITH HYPERGLYCEMIA, WITH LONG-TERM CURRENT USE OF INSULIN: ICD-10-CM

## 2024-09-27 DIAGNOSIS — Z79.4 TYPE 2 DIABETES MELLITUS WITHOUT COMPLICATION, WITH LONG-TERM CURRENT USE OF INSULIN (MULTI): ICD-10-CM

## 2024-09-27 DIAGNOSIS — I10 ESSENTIAL HYPERTENSION: ICD-10-CM

## 2024-09-27 DIAGNOSIS — E11.9 TYPE 2 DIABETES MELLITUS WITHOUT COMPLICATION, WITH LONG-TERM CURRENT USE OF INSULIN (MULTI): ICD-10-CM

## 2024-09-27 DIAGNOSIS — E78.2 MIXED HYPERLIPIDEMIA: ICD-10-CM

## 2024-09-27 NOTE — PROGRESS NOTES
Clinical Pharmacy Appointment    Patient ID: Cortney Ruby is a 63 y.o. female who presents for Diabetes.    Pt is here for Follow Up appointment.     Referring Provider: Mayra Lewis, *  PCP: Mayra Lewis MD   Last visit with PCP: 8.28.24   Next visit with PCP: 12.13.24    Subjective   Interval History:  Started Ozempic on 9.23  Felt nauseated, loose stools  Blood glucose levels fluctuating, but improving  Lost about 5 pounds in one week  HPI  DIABETES MELLITUS TYPE 2:    Diagnosed with diabetes: > 5 years ago. Known diabetic complications: smoker, HTN, HLD, obesity, COPD.  Does patient follow with Endocrinology: Yes, Dr. Kyle Villalta  Last optometry exam: Not assessed today  Most recent visit in Podiatry: Not assessed today      Current diabetic medications include:  Humalog Mix 75-25 - 50 units subcutaneously BID  Ozempic 0.25 mg subcutaneously once weekly on Mondays    Glucose Readings:  Glucometer/CGM Type: glucometer  Patient reports she has not been checking blood glucose recently    Current home BG readings: 180, 211, 128, 163, 189, 142, 210, 162    Any episodes of hypoglycemia? No, patient denies .  Did patient treat episode of hypoglycemia appropriately? N/A  Does pt have proteinuria? No - UACr 11.3 on 8.27.24    Lifestyle:  Diet: Patient reports she recently lost her mom, and has been depressed, therefore has been eating more/making unhealthy diet choices    Physical Activity: Limited    Secondary Prevention:  Statin? Yes - atorvastatin 40 mg daily  ACE-I/ARB? No  Aspirin? No    Pertinent PMH Review:  PMH of Pancreatitis: No  PMH of Retinopathy: No  PMH of Urinary Tract Infections: No  PMH of MTC: No    Drug Interactions  No relevant drug interactions were noted.      Objective   Allergies   Allergen Reactions    Norco [Hydrocodone-Acetaminophen] Nausea/vomiting     Social History     Social History Narrative    Not on file      Medication Review  Current Outpatient Medications  "  Medication Instructions    amLODIPine (NORVASC) 2.5 mg, oral, Daily    atorvastatin (LIPITOR) 40 mg, oral, Daily    BD Ultra-Fine Micro Pen Needle 32 gauge x 1/4\" needle USE TWICE DAILY    Combivent Respimat  mcg/actuation inhaler 1 puff, inhalation, 4 times daily RT    fluticasone-umeclidin-vilanter (Trelegy Ellipta) 100-62.5-25 mcg blister with device 1 puff, inhalation, Daily    HumaLOG Mix 75-25 KwikPen 100 unit/mL (75-25) injection INJECT 50 UNIT Twice daily    ibuprofen 800 mg, oral, Every 8 hours PRN    OneTouch Delica Plus Lancet 33 gauge misc USE DAILY    OneTouch Ultra Test strip TEST BID AND PRN    semaglutide 0.25 mg or 0.5 mg (2 mg/3 mL) pen injector Inject 0.25 mg under the skin 1 (one) time per week for 28 days, THEN 0.5 mg 1 (one) time per week for 28 days.      Vitals  BP Readings from Last 2 Encounters:   08/27/24 126/66   11/08/23 138/82     BMI Readings from Last 1 Encounters:   08/27/24 37.61 kg/m²      Labs  A1C  Lab Results   Component Value Date    HGBA1C 9.3 (H) 08/27/2024    HGBA1C 8.9 (H) 05/24/2024    HGBA1C 8.7 (H) 11/08/2023     BMP  Lab Results   Component Value Date    CALCIUM 9.2 08/27/2024     08/27/2024    K 3.7 08/27/2024    CO2 25 08/27/2024     08/27/2024    BUN 9 08/27/2024    CREATININE 0.61 08/27/2024    EGFR >90 08/27/2024     LFTs  Lab Results   Component Value Date    ALT 24 08/27/2024    AST 18 08/27/2024    ALKPHOS 112 08/27/2024    BILITOT 0.5 08/27/2024     FLP  Lab Results   Component Value Date    TRIG 162 (H) 08/27/2024    CHOL 153 08/27/2024    LDLF 62 01/11/2022    LDLCALC 63 08/27/2024    HDL 57.2 08/27/2024     Urine Microalbumin  Lab Results   Component Value Date    MICROALBCREA 11.3 08/27/2024     Weight Management  Wt Readings from Last 3 Encounters:   08/27/24 103 kg (226 lb)   11/22/23 104 kg (230 lb)   11/08/23 104 kg (230 lb)      There is no height or weight on file to calculate BMI.     Assessment/Plan   Problem List Items Addressed " This Visit       Essential hypertension    Relevant Orders    Follow Up In [facility] Pharmacy    Mixed hyperlipidemia    Relevant Orders    Follow Up In [facility]acy    Morbid obesity (Multi)    Relevant Orders    Follow Up In [facility]acy    Type 2 diabetes mellitus with hyperglycemia, with long-term current use of insulin (Multi)     Patient's goal A1c is < 7%.  Is pt at goal? No, 9.3% on 8.27.24  Patient's SMBGs are above goal but improving with Ozempic initiation  Rationale for plan: Will plan to slowly titrate Ozempic at this time.     Medication Changes:  CONTINUE:  Humalog Mix 75-25 - 50 units subcutaneously BID  Ozempic 0.25 mg subcutaneously x3 more weeks, then increase to 0.5 mg weekly thereafter.     Future Considerations:  Titrate GLP1  Start SGLT2i  Consider ACEi for renoprotective properties  Decrease insulin dosage    Monitoring and Education:  Ozempic Education:   Counseled patient on Ozempic MOA, expectations, side effects, duration of therapy, administration, and monitoring parameters.  Provided detailed dosing and administration counseling to ensure proper technique.   Reviewed Ozempic titration schedule, starting with 0.25 mg once weekly for 4 weeks, then continuing on 0.5 mg once weekly. Pt verbalized understanding.  Counseled patient on the benefits of GLP-1ra, such as cardiovascular risk reduction, glycemic control, and weight loss potential.  Reviewed storage requirements of Ozempic when not in use, and when to administer the medication if a dose is missed.  Advised patient that they may experience improved satiety after meals and portion sizes of meals may be reduced as doses of Ozempic increase.  Counseled patient to avoid foods that are fatty/oily as this may precipitate the nausea/GI upset that may occur with new start Ozempic.          Relevant Orders    Follow Up In [facility] Pharmacy    Chronic obstructive pulmonary  disease (Multi)    Relevant Orders    Follow Up In [facility] Pharmacy     Other Visit Diagnoses       Type 2 diabetes mellitus without complication, with long-term current use of insulin (Multi)        Relevant Orders    Follow Up In [facility] Pharmacy          Clinical Pharmacist follow-up: 10/21 @ 2 pm, Telehealth visit    Continue all meds under the continuation of care with the referring provider and clinical pharmacy team.    Thank you,  Luz Sharp, PharmD  Clinical Pharmacist  [phone]    Verbal consent to manage patient's drug therapy was obtained from the patient. They were informed they may decline to participate or withdraw from participation in pharmacy services at any time.

## 2024-09-27 NOTE — ASSESSMENT & PLAN NOTE
Patient's goal A1c is < 7%.  Is pt at goal? No, 9.3% on 8.27.24  Patient's SMBGs are above goal but improving with Ozempic initiation  Rationale for plan: Will plan to slowly titrate Ozempic at this time.     Medication Changes:  CONTINUE:  Humalog Mix 75-25 - 50 units subcutaneously BID  Ozempic 0.25 mg subcutaneously x3 more weeks, then increase to 0.5 mg weekly thereafter.     Future Considerations:  Titrate GLP1  Start SGLT2i  Consider ACEi for renoprotective properties  Decrease insulin dosage    Monitoring and Education:  Ozempic Education:   Counseled patient on Ozempic MOA, expectations, side effects, duration of therapy, administration, and monitoring parameters.  Provided detailed dosing and administration counseling to ensure proper technique.   Reviewed Ozempic titration schedule, starting with 0.25 mg once weekly for 4 weeks, then continuing on 0.5 mg once weekly. Pt verbalized understanding.  Counseled patient on the benefits of GLP-1ra, such as cardiovascular risk reduction, glycemic control, and weight loss potential.  Reviewed storage requirements of Ozempic when not in use, and when to administer the medication if a dose is missed.  Advised patient that they may experience improved satiety after meals and portion sizes of meals may be reduced as doses of Ozempic increase.  Counseled patient to avoid foods that are fatty/oily as this may precipitate the nausea/GI upset that may occur with new start Ozempic.

## 2024-10-02 ENCOUNTER — TELEPHONE (OUTPATIENT)
Dept: PRIMARY CARE | Facility: CLINIC | Age: 64
End: 2024-10-02
Payer: COMMERCIAL

## 2024-10-02 NOTE — PROGRESS NOTES
Writer outreached pt regarding their referral to [facility]. Explained program and answered pt's questions. Pt requested to schedule future initial assessment. Pt is scheduled for 11/1 @ 11:30am telehealth.

## 2024-10-03 ENCOUNTER — TELEPHONE (OUTPATIENT)
Dept: PRIMARY CARE | Facility: CLINIC | Age: 64
End: 2024-10-03
Payer: COMMERCIAL

## 2024-10-03 DIAGNOSIS — I10 ESSENTIAL HYPERTENSION: ICD-10-CM

## 2024-10-03 RX ORDER — AMLODIPINE BESYLATE 2.5 MG/1
2.5 TABLET ORAL DAILY
Qty: 90 TABLET | Refills: 3 | Status: SHIPPED | OUTPATIENT
Start: 2024-10-03

## 2024-10-07 ENCOUNTER — HOSPITAL ENCOUNTER (OUTPATIENT)
Facility: HOSPITAL | Age: 64
Setting detail: OUTPATIENT SURGERY
Discharge: HOME | End: 2024-10-07
Attending: ORTHOPAEDIC SURGERY | Admitting: ORTHOPAEDIC SURGERY
Payer: COMMERCIAL

## 2024-10-07 VITALS
HEIGHT: 65 IN | HEART RATE: 67 BPM | SYSTOLIC BLOOD PRESSURE: 119 MMHG | WEIGHT: 220.24 LBS | TEMPERATURE: 97.9 F | BODY MASS INDEX: 36.69 KG/M2 | DIASTOLIC BLOOD PRESSURE: 76 MMHG | RESPIRATION RATE: 16 BRPM | OXYGEN SATURATION: 94 %

## 2024-10-07 DIAGNOSIS — M65.332 TRIGGER FINGER, LEFT MIDDLE FINGER: Primary | ICD-10-CM

## 2024-10-07 LAB — GLUCOSE BLD MANUAL STRIP-MCNC: 132 MG/DL (ref 74–99)

## 2024-10-07 PROCEDURE — 2500000004 HC RX 250 GENERAL PHARMACY W/ HCPCS (ALT 636 FOR OP/ED): Performed by: ORTHOPAEDIC SURGERY

## 2024-10-07 PROCEDURE — 26055 INCISE FINGER TENDON SHEATH: CPT | Performed by: ORTHOPAEDIC SURGERY

## 2024-10-07 PROCEDURE — 82947 ASSAY GLUCOSE BLOOD QUANT: CPT

## 2024-10-07 PROCEDURE — 2500000005 HC RX 250 GENERAL PHARMACY W/O HCPCS: Performed by: ORTHOPAEDIC SURGERY

## 2024-10-07 PROCEDURE — 3600000003 HC OR TIME - INITIAL BASE CHARGE - PROCEDURE LEVEL THREE: Performed by: ORTHOPAEDIC SURGERY

## 2024-10-07 PROCEDURE — 7100000010 HC PHASE TWO TIME - EACH INCREMENTAL 1 MINUTE: Performed by: ORTHOPAEDIC SURGERY

## 2024-10-07 PROCEDURE — 3600000008 HC OR TIME - EACH INCREMENTAL 1 MINUTE - PROCEDURE LEVEL THREE: Performed by: ORTHOPAEDIC SURGERY

## 2024-10-07 PROCEDURE — 7100000009 HC PHASE TWO TIME - INITIAL BASE CHARGE: Performed by: ORTHOPAEDIC SURGERY

## 2024-10-07 RX ORDER — SODIUM CHLORIDE 0.9 G/100ML
IRRIGANT IRRIGATION AS NEEDED
Status: DISCONTINUED | OUTPATIENT
Start: 2024-10-07 | End: 2024-10-07 | Stop reason: HOSPADM

## 2024-10-07 RX ORDER — BUPIVACAINE HYDROCHLORIDE 2.5 MG/ML
INJECTION, SOLUTION INFILTRATION; PERINEURAL AS NEEDED
Status: DISCONTINUED | OUTPATIENT
Start: 2024-10-07 | End: 2024-10-07 | Stop reason: HOSPADM

## 2024-10-07 RX ORDER — LIDOCAINE HYDROCHLORIDE 10 MG/ML
INJECTION, SOLUTION EPIDURAL; INFILTRATION; INTRACAUDAL; PERINEURAL AS NEEDED
Status: DISCONTINUED | OUTPATIENT
Start: 2024-10-07 | End: 2024-10-07 | Stop reason: HOSPADM

## 2024-10-07 ASSESSMENT — PAIN DESCRIPTION - DESCRIPTORS: DESCRIPTORS: DISCOMFORT

## 2024-10-07 ASSESSMENT — PAIN SCALES - GENERAL
PAINLEVEL_OUTOF10: 0 - NO PAIN
PAINLEVEL_OUTOF10: 8

## 2024-10-07 ASSESSMENT — PAIN - FUNCTIONAL ASSESSMENT
PAIN_FUNCTIONAL_ASSESSMENT: 0-10
PAIN_FUNCTIONAL_ASSESSMENT: 0-10

## 2024-10-07 NOTE — OP NOTE
LONG FINGER TRIGGER FINGER RELEASE (L) Operative Note     Date: 10/7/2024  OR Location: [facility]    Name: Cortney Ruby, : 1960, Age: 63 y.o., MRN: [id number], Sex: female    Diagnosis  Pre-op Diagnosis      * Trigger finger, left middle finger [M65.332] Post-op Diagnosis     * Trigger finger, left middle finger [M65.332]     Procedures  LONG FINGER TRIGGER FINGER RELEASE  11699 - [place] TENDON SHEATH INCISION      Surgeons      * Azael Cornelius - Primary    Resident/Fellow/Other Assistant:  Surgeons and Role:     * Clayton Ryan PA-C - Assisting    Procedure Summary  Anesthesia: Anesthesia type not filed in the log.  ASA: ASA status not filed in the log.  Anesthesia Staff: No anesthesia staff entered.  Estimated Blood Loss: Minimal  Intra-op Medications: Administrations occurring from 0930 to 1015 on 10/07/24:  * No intraprocedure medications in log *           Anesthesia Record               Intraprocedure I/O Totals       None           Specimen: No specimens collected     Staff:   Circulator: Luis  Scrub Person: Bell         Drains and/or Catheters: * None in log *    Tourniquet Times:   * Missing tourniquet times found for documented tourniquets in lo *     Implants:     Findings: Left long trigger finger    Indications: Cortney Ruby is an 63 y.o. female who is having surgery for Trigger finger, left middle finger [M65.332].     The patient was seen in the preoperative area. The risks, benefits, complications, treatment options, non-operative alternatives, expected recovery and outcomes were discussed with the patient. The possibilities of reaction to medication, pulmonary aspiration, injury to surrounding structures, bleeding, recurrent infection, the need for additional procedures, failure to diagnose a condition, and creating a complication requiring transfusion or operation were discussed with the patient. The patient concurred with the proposed plan, giving informed consent.  The site of surgery was  properly noted/marked if necessary per policy. The patient has been actively warmed in preoperative area. Preoperative antibiotics are not indicated. Venous thrombosis prophylaxis are not indicated.    Procedure Details: Indications:    This is a 63-year-old female who complains of left long finger triggering.  They failed conservative treatment which consisted of cortisone injections.  Because of the patient's symptoms, clinical exam and having failed conservative treatment a trigger finger release was offered.  The procedure was explained along with the risks, benefits and alternatives being reviewed.  Potential risk including but not limited to infection, neurovascular complication, recurrent triggering, pain as well as a potential need for reoperation and/or revision trigger finger surgery were all discussed with the patient and they consented to the procedure.    Operative procedure:    The patient was brought to the operative suite and placed in the supine position.  All bony promises were well-padded.  A timeout was performed.  The patient identified, the site and laterality were confirmed.  I locally prepped the left hand palmarly over the A1 pulley with an alcohol prep.  Using the no touch technique I anesthetized my planned incision site with a 50-50 mix of quarter percent Marcaine plain 1% Xylocaine plain.  While the block was setting up a tourniquet is placed on the right arm proximally over Webril.  The right hand and upper extremity was then sterilely prepped with ChloraPrep and sterilely draped in usual manner.    I began by exsanguinating the left hand and upper extremity inflated the tourniquet to 250 mmHg.  Identified landmarks.  I made an incision over the A1 pulley of the left long finger distal to the palmar flexion crease.  Dissection was carried down carefully through the subcutaneous and fatty tissues.  I dissected down to the A1 pulley which was readily identified.  With the PA holding  retractors radially and ulnarly to protect the neurovascular structures I incised through the A1 pulley from proximal to distal.  There was no tendon damage appreciated.  I took the finger through range of motion.  There was no evidence of triggering.  The tourniquet was released.  Hemostasis was maintained with gentle pressure.  I had the patient flex and extend their finger at the PIP joint.  There was no evidence of triggering.  Attention was turned to closure.    After thoroughly copiously irrigating the wound with normal saline, the PA reapproximated the wound edges with nylon suture.  Sterile dressings were applied which consisted of Xeroform 4 x 4's followed by a Cathy and an Ace.      The physician assistant was present for the entire case.  Given the nature of the procedure and disease process a skilled surgical assistant was necessary for the case.  The assistant was necessary for retraction and helped directly facilitate completion of the surgery.  A certified scrub tech was at the back table managing instruments and supplies for the surgical procedure.    Complications:  None; patient tolerated the procedure well.    Disposition: PACU - hemodynamically stable.  Condition: stable         Additional Details: Not applicable    Attending Attestation:     Azael Cornelius  Phone Number: [phone]

## 2024-10-07 NOTE — H&P
"Allergies   Allergen Reactions    Norco [Hydrocodone-Acetaminophen] Nausea/vomiting       REVIEW OF SYSTEMS  General: Negative for malaise, significant weight loss, fever  Musculoskeletal: See HPI  Neuro:  Negative for numbness/tingling      Medication Documentation Review Audit       Reviewed by Mayra Lewis MD (Physician) on 08/27/24 at 1429      Medication Order Taking? Sig Documenting Provider Last Dose Status   amLODIPine (Norvasc) 2.5 mg tablet [id number] Yes Take 1 tablet (2.5 mg) by mouth once daily. Mayra Lewis MD Taking Active   atorvastatin (Lipitor) 40 mg tablet [id number] Yes Take 1 tablet (40 mg) by mouth once daily. Mayra Lewis MD Taking Active   BD Ultra-Fine Micro Pen Needle 32 gauge x 1/4\" needle [id number] Yes USE TWICE DAILY Historical Provider, MD Taking Active     Discontinued 08/27/24 1349   Combivent Respimat  mcg/actuation inhaler [id number] Yes Inhale 1 puff 4 times a day. Mayra Lewis MD Taking Active   fluticasone-umeclidin-vilanter (Trelegy Ellipta) 100-62.5-25 mcg blister with device [id number] Yes Inhale 1 puff once daily. Mayra Lewis MD Taking Active   HumaLOG Mix 75-25 KwikPen 100 unit/mL (75-25) injection [id number] Yes INJECT 50 UNIT Twice daily Historical Provider, MD Taking Active   ibuprofen 800 mg tablet [id number] Yes Take 1 tablet (800 mg) by mouth every 8 hours if needed (pain). Mayra Lewis MD Taking Active   OneTouch Delica Plus Lancet 33 gauge misc [id number] Yes USE DAILY Historical Provider, MD Taking Active   OneTouch Ultra Test strip [id number] Yes TEST BID AND PRN Historical Provider, MD Taking Active   semaglutide 0.25 mg or 0.5 mg (2 mg/3 mL) pen injector [id number]  Inject 0.25 mg under the skin 1 (one) time per week. Mayra Lewis MD  Active                    HPI:  Left middle finger trigger finger, catching and popping    PHYSICAL EXAM  General Appearance/Mental Status: A&Ox3, no apparent distress  HEENT: " Normal  Lungs: Clear  Heart: RRR  Abdomen: Soft, nontender  Extremities: Abnormal catching and popping at the left middle finger A1 Pulley, palpable mass    Assessment:  Left middle finger trigger finger    Plan:  Left middle finger trigger finger release  Local anesthesia  All medications and allergies reviewed  She is a smoker  Follow up post operatively  All questions answered

## 2024-10-15 ENCOUNTER — APPOINTMENT (OUTPATIENT)
Dept: ORTHOPEDIC SURGERY | Facility: CLINIC | Age: 64
End: 2024-10-15
Payer: COMMERCIAL

## 2024-10-15 DIAGNOSIS — Z98.890 S/P TRIGGER FINGER RELEASE: Primary | ICD-10-CM

## 2024-10-15 PROCEDURE — 3046F HEMOGLOBIN A1C LEVEL >9.0%: CPT | Performed by: ORTHOPAEDIC SURGERY

## 2024-10-15 PROCEDURE — 3061F NEG MICROALBUMINURIA REV: CPT | Performed by: ORTHOPAEDIC SURGERY

## 2024-10-15 PROCEDURE — 3048F LDL-C <100 MG/DL: CPT | Performed by: ORTHOPAEDIC SURGERY

## 2024-10-15 PROCEDURE — 99024 POSTOP FOLLOW-UP VISIT: CPT | Performed by: ORTHOPAEDIC SURGERY

## 2024-10-15 NOTE — PROGRESS NOTES
"          History of Present Illness   Postop left middle finger trigger release on 10/9/2024     Review of Systems   GENERAL: Negative for malaise, significant weight loss, fever  MUSCULOSKELETAL: see HPI  NEURO:  Negative     Past Medical History:   Diagnosis Date    Arthritis     Closed fracture of lateral portion of right tibial plateau     COPD (chronic obstructive pulmonary disease) (Multi)     Depression     Diabetes mellitus (Multi)     History of mammogram 07/2016    cat 2    Hyperlipidemia     Hypertension     Internal derangement of knee     Mammogram declined     Pap smear of cervix declined     Pap test, as part of routine gynecological examination 05/2015    wnl, hpv not done    Peripheral vascular disease of foot (CMS-HCC)     Respiratory failure (Multi)     Type 2 diabetes mellitus     Vertigo         Medication Documentation Review Audit       Reviewed by Lisa Xie RN (Registered Nurse) on 10/07/24 at 0811      Medication Order Taking? Sig Documenting Provider Last Dose Status   amLODIPine (Norvasc) 2.5 mg tablet [id number] Yes Take 1 tablet (2.5 mg) by mouth once daily. Mayra Lewis MD 10/6/2024 Active   atorvastatin (Lipitor) 40 mg tablet [id number] Yes Take 1 tablet (40 mg) by mouth once daily. Mayra Lewis MD 10/6/2024 Active   BD Ultra-Fine Micro Pen Needle 32 gauge x 1/4\" needle [id number]  USE TWICE DAILY Historical Provider, MD  Active   Combivent Respimat  mcg/actuation inhaler [id number] Yes Inhale 1 puff 4 times a day. Mayra Lewis MD 10/6/2024 Active   fluticasone-umeclidin-vilanter (Trelegy Ellipta) 100-62.5-25 mcg blister with device [id number] Yes Inhale 1 puff once daily. Mayra Lewis MD Past Month Active   HumaLOG Mix 75-25 KwikPen 100 unit/mL (75-25) injection [id number] Yes INJECT 50 UNIT Twice daily Historical Provider, MD 10/6/2024 Active   ibuprofen 800 mg tablet [id number] Yes Take 1 tablet (800 mg) by mouth every 8 hours if needed (pain). " Mayra Lewis MD Past Week Active   OneTouch Delica Plus Lancet 33 gauge misc [id number]  USE DAILY Historical Provider, MD  Active   OneTouch Ultra Test strip [id number]  TEST BID AND PRN Historical Provider, MD  Active   semaglutide 0.25 mg or 0.5 mg (2 mg/3 mL) pen injector [id number] Yes Inject 0.25 mg under the skin 1 (one) time per week for 28 days, THEN 0.5 mg 1 (one) time per week for 28 days. Mayra Lewis MD Past Week Active                     Physical Exam  Left middle finger  Incision is healing well without any evidence of erythema ecchymosis or effusion, there are nylon sutures in place  She has full range of motion of the left middle finger without any triggering or catching  She is distally neurovascularly intact     Imaging  ECG 12 lead  Sinus rhythm with 1st degree AV block  Possible Left atrial enlargement  Borderline ECG  When compared with ECG of 02-AUG-2002 09:30,  No significant change was found  Confirmed by Regulo Pollard ([id number]) on 10/26/2023 9:27:42 PM       Assessment   Status post left middle finger trigger release     Plan  Sutures removed  Follow-up in 3 weeks for reevaluation  All questions answered

## 2024-10-21 ENCOUNTER — APPOINTMENT (OUTPATIENT)
Dept: PHARMACY | Facility: HOSPITAL | Age: 64
End: 2024-10-21
Payer: COMMERCIAL

## 2024-10-21 DIAGNOSIS — E11.9 TYPE 2 DIABETES MELLITUS WITHOUT COMPLICATION, WITH LONG-TERM CURRENT USE OF INSULIN (MULTI): ICD-10-CM

## 2024-10-21 DIAGNOSIS — Z23 NEED FOR VACCINATION: ICD-10-CM

## 2024-10-21 DIAGNOSIS — E11.65 TYPE 2 DIABETES MELLITUS WITH HYPERGLYCEMIA, WITH LONG-TERM CURRENT USE OF INSULIN: ICD-10-CM

## 2024-10-21 DIAGNOSIS — I10 ESSENTIAL HYPERTENSION: ICD-10-CM

## 2024-10-21 DIAGNOSIS — J44.9 CHRONIC OBSTRUCTIVE PULMONARY DISEASE, UNSPECIFIED COPD TYPE (MULTI): ICD-10-CM

## 2024-10-21 DIAGNOSIS — E78.2 MIXED HYPERLIPIDEMIA: ICD-10-CM

## 2024-10-21 DIAGNOSIS — M19.90 GENERALIZED ARTHRITIS: ICD-10-CM

## 2024-10-21 DIAGNOSIS — Z12.11 COLON CANCER SCREENING: ICD-10-CM

## 2024-10-21 DIAGNOSIS — Z00.00 ENCOUNTER FOR MEDICARE ANNUAL WELLNESS EXAM: ICD-10-CM

## 2024-10-21 DIAGNOSIS — E66.01 MORBID OBESITY (MULTI): ICD-10-CM

## 2024-10-21 DIAGNOSIS — Z79.4 TYPE 2 DIABETES MELLITUS WITHOUT COMPLICATION, WITH LONG-TERM CURRENT USE OF INSULIN (MULTI): ICD-10-CM

## 2024-10-21 DIAGNOSIS — Z79.4 TYPE 2 DIABETES MELLITUS WITH HYPERGLYCEMIA, WITH LONG-TERM CURRENT USE OF INSULIN: ICD-10-CM

## 2024-10-21 NOTE — PROGRESS NOTES
Clinical Pharmacy Appointment    Patient ID: Cortney Ruby is a 63 y.o. female who presents for Diabetes.    Pt is here for Follow Up appointment.     Referring Provider: Mayra Lewis, *  PCP: Mayra Lewis MD   Last visit with PCP: 8.28.24   Next visit with PCP: 12.13.24    Subjective   Interval History:  Increased Ozempic 0.5 mg x1 injection  SE improving  Significant appetite suppression; reduced portions  SMBGs at goal  Hypoglycemia episode led to not taking all Humalog 75-25 injections    HPI  DIABETES MELLITUS TYPE 2:    Diagnosed with diabetes: > 5 years ago. Known diabetic complications: smoker, HTN, HLD, obesity, COPD.  Does patient follow with Endocrinology: Yes, Dr. Kyle Villalta  Last optometry exam: Not assessed today  Most recent visit in Podiatry: Not assessed today      Current diabetic medications include:  Humalog Mix 75-25 - 50 units subcutaneously BID  Ozempic 0.5 mg subcutaneously once weekly on Mondays    Glucose Readings:  Glucometer/CGM Type: glucometer  Patient reports she has not been checking blood glucose recently    Current home BG readings: 120, 108, 132, 112, 96    Any episodes of hypoglycemia? No, patient denies .  Did patient treat episode of hypoglycemia appropriately? N/A  Does pt have proteinuria? No - UACr 11.3 on 8.27.24    Lifestyle:  Diet: Patient reports she recently lost her mom, and has been depressed, therefore has been eating more/making unhealthy diet choices    Physical Activity: Limited    Secondary Prevention:  Statin? Yes - atorvastatin 40 mg daily  ACE-I/ARB? No  Aspirin? No    Pertinent PMH Review:  PMH of Pancreatitis: No  PMH of Retinopathy: No  PMH of Urinary Tract Infections: No  PMH of MTC: No    Drug Interactions  No relevant drug interactions were noted.      Objective   Allergies   Allergen Reactions    Norco [Hydrocodone-Acetaminophen] Nausea/vomiting     Social History     Social History Narrative    Not on file      Medication  "Review  Current Outpatient Medications   Medication Instructions    amLODIPine (NORVASC) 2.5 mg, oral, Daily    atorvastatin (LIPITOR) 40 mg, oral, Daily    BD Ultra-Fine Micro Pen Needle 32 gauge x 1/4\" needle USE TWICE DAILY    Combivent Respimat  mcg/actuation inhaler 1 puff, inhalation, 4 times daily RT    fluticasone-umeclidin-vilanter (Trelegy Ellipta) 100-62.5-25 mcg blister with device 1 puff, inhalation, Daily    HumaLOG Mix 75-25 KwikPen 100 unit/mL (75-25) injection INJECT 50 UNIT Twice daily    ibuprofen 800 mg, oral, Every 8 hours PRN    OneTouch Delica Plus Lancet 33 gauge misc USE DAILY    OneTouch Ultra Test strip TEST BID AND PRN    semaglutide 0.25 mg or 0.5 mg (2 mg/3 mL) pen injector Inject 0.25 mg under the skin 1 (one) time per week for 28 days, THEN 0.5 mg 1 (one) time per week for 28 days.      Vitals  BP Readings from Last 2 Encounters:   10/07/24 119/76   08/27/24 126/66     BMI Readings from Last 1 Encounters:   10/07/24 36.65 kg/m²      Labs  A1C  Lab Results   Component Value Date    HGBA1C 9.3 (H) 08/27/2024    HGBA1C 8.9 (H) 05/24/2024    HGBA1C 8.7 (H) 11/08/2023     BMP  Lab Results   Component Value Date    CALCIUM 9.2 08/27/2024     08/27/2024    K 3.7 08/27/2024    CO2 25 08/27/2024     08/27/2024    BUN 9 08/27/2024    CREATININE 0.61 08/27/2024    EGFR >90 08/27/2024     LFTs  Lab Results   Component Value Date    ALT 24 08/27/2024    AST 18 08/27/2024    ALKPHOS 112 08/27/2024    BILITOT 0.5 08/27/2024     FLP  Lab Results   Component Value Date    TRIG 162 (H) 08/27/2024    CHOL 153 08/27/2024    LDLF 62 01/11/2022    LDLCALC 63 08/27/2024    HDL 57.2 08/27/2024     Urine Microalbumin  Lab Results   Component Value Date    MICROALBCREA 11.3 08/27/2024     Weight Management  Wt Readings from Last 3 Encounters:   10/07/24 99.9 kg (220 lb 3.8 oz)   08/27/24 103 kg (226 lb)   11/22/23 104 kg (230 lb)      There is no height or weight on file to calculate BMI. "     Assessment/Plan   Problem List Items Addressed This Visit       Essential hypertension    Mixed hyperlipidemia    Morbid obesity (Multi)    Type 2 diabetes mellitus with hyperglycemia, with long-term current use of insulin     Patient's goal A1c is < 7%.  Is pt at goal? No, 9.3% on 8.27.24  Patient's SMBGs are at goal  Rationale for plan: Will plan to slowly titrate Ozempic at this time, and decrease Humalog 75-25 dose.    Medication Changes:  Continue:  Ozempic 0.5 mg subcutaneously once weekly. Prescription sent to [facility] pharmacy for mail order.  DECREASE:  Humalog Mix 75-25 - 44 units subcutaneously BID    Future Considerations:  Titrate GLP1  Start SGLT2i  Consider ACEi for renoprotective properties  Decrease insulin dosage    Monitoring and Education:  Ozempic Education:   Counseled patient on Ozempic MOA, expectations, side effects, duration of therapy, administration, and monitoring parameters.  Provided detailed dosing and administration counseling to ensure proper technique.   Reviewed Ozempic titration schedule, starting with 0.25 mg once weekly for 4 weeks, then continuing on 0.5 mg once weekly. Pt verbalized understanding.  Counseled patient on the benefits of GLP-1ra, such as cardiovascular risk reduction, glycemic control, and weight loss potential.  Reviewed storage requirements of Ozempic when not in use, and when to administer the medication if a dose is missed.  Advised patient that they may experience improved satiety after meals and portion sizes of meals may be reduced as doses of Ozempic increase.  Counseled patient to avoid foods that are fatty/oily as this may precipitate the nausea/GI upset that may occur with new start Ozempic.          Chronic obstructive pulmonary disease (Multi)     Other Visit Diagnoses       Type 2 diabetes mellitus without complication, with long-term current use of insulin (Multi)              Clinical Pharmacist follow-up: 11/18 @ 2 pm, [facility]  visit    Continue all meds under the continuation of care with the referring provider and clinical pharmacy team.    Thank you,  Luz Sharp, PharmD  Clinical Pharmacist  [phone]    Verbal consent to manage patient's drug therapy was obtained from the patient. They were informed they may decline to participate or withdraw from participation in pharmacy services at any time.

## 2024-10-21 NOTE — ASSESSMENT & PLAN NOTE
Patient's goal A1c is < 7%.  Is pt at goal? No, 9.3% on 8.27.24  Patient's SMBGs are at goal  Rationale for plan: Will plan to slowly titrate Ozempic at this time, and decrease Humalog 75-25 dose.    Medication Changes:  Continue:  Ozempic 0.5 mg subcutaneously once weekly. Prescription sent to [facility] pharmacy for mail order.  DECREASE:  Humalog Mix 75-25 - 44 units subcutaneously BID    Future Considerations:  Titrate GLP1  Start SGLT2i  Consider ACEi for renoprotective properties  Decrease insulin dosage    Monitoring and Education:  Ozempic Education:   Counseled patient on Ozempic MOA, expectations, side effects, duration of therapy, administration, and monitoring parameters.  Provided detailed dosing and administration counseling to ensure proper technique.   Reviewed Ozempic titration schedule, starting with 0.25 mg once weekly for 4 weeks, then continuing on 0.5 mg once weekly. Pt verbalized understanding.  Counseled patient on the benefits of GLP-1ra, such as cardiovascular risk reduction, glycemic control, and weight loss potential.  Reviewed storage requirements of Ozempic when not in use, and when to administer the medication if a dose is missed.  Advised patient that they may experience improved satiety after meals and portion sizes of meals may be reduced as doses of Ozempic increase.  Counseled patient to avoid foods that are fatty/oily as this may precipitate the nausea/GI upset that may occur with new start Ozempic.

## 2024-10-24 ENCOUNTER — APPOINTMENT (OUTPATIENT)
Dept: RADIOLOGY | Facility: HOSPITAL | Age: 64
End: 2024-10-24
Payer: COMMERCIAL

## 2024-11-01 ENCOUNTER — APPOINTMENT (OUTPATIENT)
Dept: PRIMARY CARE | Facility: CLINIC | Age: 64
End: 2024-11-01
Payer: COMMERCIAL

## 2024-11-01 PROCEDURE — RXMED WILLOW AMBULATORY MEDICATION CHARGE

## 2024-11-02 ENCOUNTER — PHARMACY VISIT (OUTPATIENT)
Dept: PHARMACY | Facility: CLINIC | Age: 64
End: 2024-11-02
Payer: MEDICARE

## 2024-11-05 ENCOUNTER — APPOINTMENT (OUTPATIENT)
Dept: ORTHOPEDIC SURGERY | Facility: CLINIC | Age: 64
End: 2024-11-05
Payer: COMMERCIAL

## 2024-11-18 ENCOUNTER — APPOINTMENT (OUTPATIENT)
Dept: PHARMACY | Facility: HOSPITAL | Age: 64
End: 2024-11-18
Payer: COMMERCIAL

## 2024-11-18 DIAGNOSIS — E11.9 TYPE 2 DIABETES MELLITUS WITHOUT COMPLICATION, WITH LONG-TERM CURRENT USE OF INSULIN (MULTI): ICD-10-CM

## 2024-11-18 DIAGNOSIS — Z12.11 COLON CANCER SCREENING: ICD-10-CM

## 2024-11-18 DIAGNOSIS — Z79.4 TYPE 2 DIABETES MELLITUS WITH HYPERGLYCEMIA, WITH LONG-TERM CURRENT USE OF INSULIN: ICD-10-CM

## 2024-11-18 DIAGNOSIS — I10 ESSENTIAL HYPERTENSION: ICD-10-CM

## 2024-11-18 DIAGNOSIS — Z00.00 ENCOUNTER FOR MEDICARE ANNUAL WELLNESS EXAM: ICD-10-CM

## 2024-11-18 DIAGNOSIS — E11.65 TYPE 2 DIABETES MELLITUS WITH HYPERGLYCEMIA, WITH LONG-TERM CURRENT USE OF INSULIN: ICD-10-CM

## 2024-11-18 DIAGNOSIS — Z23 NEED FOR VACCINATION: ICD-10-CM

## 2024-11-18 DIAGNOSIS — M19.90 GENERALIZED ARTHRITIS: ICD-10-CM

## 2024-11-18 DIAGNOSIS — E66.01 MORBID OBESITY (MULTI): ICD-10-CM

## 2024-11-18 DIAGNOSIS — Z79.4 TYPE 2 DIABETES MELLITUS WITHOUT COMPLICATION, WITH LONG-TERM CURRENT USE OF INSULIN (MULTI): ICD-10-CM

## 2024-11-18 DIAGNOSIS — E78.2 MIXED HYPERLIPIDEMIA: ICD-10-CM

## 2024-11-18 DIAGNOSIS — J44.9 CHRONIC OBSTRUCTIVE PULMONARY DISEASE, UNSPECIFIED COPD TYPE (MULTI): ICD-10-CM

## 2024-11-18 NOTE — ASSESSMENT & PLAN NOTE
Patient's goal A1c is < 7%.  Is pt at goal? No, 9.3% on 8.27.24  Patient's SMBGs are mostly at goal  Rationale for plan: Will plan to continue current Ozempic dose, and decrease Humalog slightly due to 1-2 episodes of hypoglycemia.    Medication Changes:  Continue:  Ozempic 0.5 mg subcutaneously once weekly.   DECREASE:  Humalog Mix 75-25 - 38 units subcutaneously BID    Future Considerations:  Titrate GLP1  Start SGLT2i  Consider ACEi for renoprotective properties  Decrease insulin dosage    Monitoring and Education:  Ozempic Education:   Counseled patient on Ozempic MOA, expectations, side effects, duration of therapy, administration, and monitoring parameters.  Provided detailed dosing and administration counseling to ensure proper technique.   Reviewed Ozempic titration schedule, starting with 0.25 mg once weekly for 4 weeks, then continuing on 0.5 mg once weekly. Pt verbalized understanding.  Counseled patient on the benefits of GLP-1ra, such as cardiovascular risk reduction, glycemic control, and weight loss potential.  Reviewed storage requirements of Ozempic when not in use, and when to administer the medication if a dose is missed.  Advised patient that they may experience improved satiety after meals and portion sizes of meals may be reduced as doses of Ozempic increase.  Counseled patient to avoid foods that are fatty/oily as this may precipitate the nausea/GI upset that may occur with new start Ozempic.

## 2024-11-18 NOTE — PROGRESS NOTES
Clinical Pharmacy Appointment    Patient ID: Cortney Ruby is a 63 y.o. female who presents for Diabetes.    Pt is here for Follow Up appointment.     Referring Provider: Mayra Lewis, *  PCP: Mayra Lewis MD   Last visit with PCP: 8.28.24   Next visit with PCP: 12.13.24    Subjective   Interval History:  Continuing Ozempic 0.5 mg   SE improving  Significant appetite suppression; reduced portions  Weight loss:  10/31/2024 - 216 pounds  11/12/2024 -  201 pounds  SMBGs mostly at goal  Confirmed follow-up with endocrinologist and PCP  Will be getting labs next week    HPI  DIABETES MELLITUS TYPE 2:    Diagnosed with diabetes: > 5 years ago. Known diabetic complications: smoker, HTN, HLD, obesity, COPD.  Does patient follow with Endocrinology: Yes, Dr. Kyle Villalta  Last optometry exam: Not assessed today  Most recent visit in Podiatry: Not assessed today      Current diabetic medications include:  Humalog Mix 75-25 - 44 units subcutaneously BID  Ozempic 0.5 mg subcutaneously once weekly on Mondays    Glucose Readings:  Glucometer/CGM Type: glucometer  Patient reports she has not been checking blood glucose recently    Current home BG readings: 114, 130, 140, 210, 160, 148    Any episodes of hypoglycemia? No, patient denies .  Did patient treat episode of hypoglycemia appropriately? N/A  Does pt have proteinuria? No - UACr 11.3 on 8.27.24    Lifestyle:  Diet: Patient reports she recently lost her mom, and has been depressed, therefore has been eating more/making unhealthy diet choices  Physical Activity: Limited    Secondary Prevention:  Statin? Yes - atorvastatin 40 mg daily  ACE-I/ARB? No  Aspirin? No    Pertinent PMH Review:  PMH of Pancreatitis: No  PMH of Retinopathy: No  PMH of Urinary Tract Infections: No  PMH of MTC: No    Drug Interactions  No relevant drug interactions were noted.      Objective   Allergies   Allergen Reactions    Norco [Hydrocodone-Acetaminophen] Nausea/vomiting  "    Social History     Social History Narrative    Not on file      Medication Review  Current Outpatient Medications   Medication Instructions    amLODIPine (NORVASC) 2.5 mg, oral, Daily    atorvastatin (LIPITOR) 40 mg, oral, Daily    BD Ultra-Fine Micro Pen Needle 32 gauge x 1/4\" needle USE TWICE DAILY    Combivent Respimat  mcg/actuation inhaler 1 puff, inhalation, 4 times daily RT    fluticasone-umeclidin-vilanter (Trelegy Ellipta) 100-62.5-25 mcg blister with device 1 puff, inhalation, Daily    HumaLOG Mix 75-25 KwikPen 100 unit/mL (75-25) injection INJECT 50 UNIT Twice daily    ibuprofen 800 mg, oral, Every 8 hours PRN    OneTouch Delica Plus Lancet 33 gauge misc USE DAILY    OneTouch Ultra Test strip TEST BID AND PRN    Ozempic 0.5 mg, subcutaneous, Once Weekly      Vitals  BP Readings from Last 2 Encounters:   10/07/24 119/76   08/27/24 126/66     BMI Readings from Last 1 Encounters:   10/07/24 36.65 kg/m²      Labs  A1C  Lab Results   Component Value Date    HGBA1C 9.3 (H) 08/27/2024    HGBA1C 8.9 (H) 05/24/2024    HGBA1C 8.7 (H) 11/08/2023     BMP  Lab Results   Component Value Date    CALCIUM 9.2 08/27/2024     08/27/2024    K 3.7 08/27/2024    CO2 25 08/27/2024     08/27/2024    BUN 9 08/27/2024    CREATININE 0.61 08/27/2024    EGFR >90 08/27/2024     LFTs  Lab Results   Component Value Date    ALT 24 08/27/2024    AST 18 08/27/2024    ALKPHOS 112 08/27/2024    BILITOT 0.5 08/27/2024     FLP  Lab Results   Component Value Date    TRIG 162 (H) 08/27/2024    CHOL 153 08/27/2024    LDLF 62 01/11/2022    LDLCALC 63 08/27/2024    HDL 57.2 08/27/2024     Urine Microalbumin  Lab Results   Component Value Date    MICROALBCREA 11.3 08/27/2024     Weight Management  Wt Readings from Last 3 Encounters:   10/07/24 99.9 kg (220 lb 3.8 oz)   08/27/24 103 kg (226 lb)   11/22/23 104 kg (230 lb)      There is no height or weight on file to calculate BMI.     Assessment/Plan   Problem List Items " Addressed This Visit       Essential hypertension    Generalized arthritis    Mixed hyperlipidemia    Morbid obesity (Multi)    Type 2 diabetes mellitus with hyperglycemia, with long-term current use of insulin     Patient's goal A1c is < 7%.  Is pt at goal? No, 9.3% on 8.27.24  Patient's SMBGs are mostly at goal  Rationale for plan: Will plan to continue current Ozempic dose, and decrease Humalog slightly due to 1-2 episodes of hypoglycemia.    Medication Changes:  Continue:  Ozempic 0.5 mg subcutaneously once weekly.   DECREASE:  Humalog Mix 75-25 - 38 units subcutaneously BID    Future Considerations:  Titrate GLP1  Start SGLT2i  Consider ACEi for renoprotective properties  Decrease insulin dosage    Monitoring and Education:  Ozempic Education:   Counseled patient on Ozempic MOA, expectations, side effects, duration of therapy, administration, and monitoring parameters.  Provided detailed dosing and administration counseling to ensure proper technique.   Reviewed Ozempic titration schedule, starting with 0.25 mg once weekly for 4 weeks, then continuing on 0.5 mg once weekly. Pt verbalized understanding.  Counseled patient on the benefits of GLP-1ra, such as cardiovascular risk reduction, glycemic control, and weight loss potential.  Reviewed storage requirements of Ozempic when not in use, and when to administer the medication if a dose is missed.  Advised patient that they may experience improved satiety after meals and portion sizes of meals may be reduced as doses of Ozempic increase.  Counseled patient to avoid foods that are fatty/oily as this may precipitate the nausea/GI upset that may occur with new start Ozempic.          Chronic obstructive pulmonary disease (Multi)    Colon cancer screening     Other Visit Diagnoses       Type 2 diabetes mellitus without complication, with long-term current use of insulin (Multi)        Encounter for Medicare annual wellness exam        Need for vaccination               Clinical Pharmacist follow-up: 12/9/24 @ 1:40 pm, Telehealth visit  38 units  Continue all meds under the continuation of care with the referring provider and clinical pharmacy team.    Thank you,  Luz Sharp, PharmD  Clinical Pharmacist  [phone]    Verbal consent to manage patient's drug therapy was obtained from the patient. They were informed they may decline to participate or withdraw from participation in pharmacy services at any time.

## 2024-11-26 DIAGNOSIS — E78.2 MIXED HYPERLIPIDEMIA: ICD-10-CM

## 2024-11-26 DIAGNOSIS — E11.65 TYPE 2 DIABETES MELLITUS WITH HYPERGLYCEMIA, WITH LONG-TERM CURRENT USE OF INSULIN: ICD-10-CM

## 2024-11-26 DIAGNOSIS — I10 ESSENTIAL HYPERTENSION: ICD-10-CM

## 2024-11-26 DIAGNOSIS — Z79.4 TYPE 2 DIABETES MELLITUS WITH HYPERGLYCEMIA, WITH LONG-TERM CURRENT USE OF INSULIN: ICD-10-CM

## 2024-11-26 DIAGNOSIS — Z23 NEED FOR VACCINATION: ICD-10-CM

## 2024-11-26 DIAGNOSIS — Z00.00 ENCOUNTER FOR MEDICARE ANNUAL WELLNESS EXAM: ICD-10-CM

## 2024-11-26 DIAGNOSIS — M19.90 GENERALIZED ARTHRITIS: ICD-10-CM

## 2024-11-26 DIAGNOSIS — E66.01 MORBID OBESITY (MULTI): ICD-10-CM

## 2024-11-26 DIAGNOSIS — J44.9 CHRONIC OBSTRUCTIVE PULMONARY DISEASE, UNSPECIFIED COPD TYPE (MULTI): ICD-10-CM

## 2024-11-26 DIAGNOSIS — Z12.11 COLON CANCER SCREENING: ICD-10-CM

## 2024-11-27 DIAGNOSIS — E11.69 TYPE 2 DIABETES MELLITUS WITH OTHER SPECIFIED COMPLICATION, WITH LONG-TERM CURRENT USE OF INSULIN (HCC): ICD-10-CM

## 2024-11-27 DIAGNOSIS — Z79.4 TYPE 2 DIABETES MELLITUS WITH OTHER SPECIFIED COMPLICATION, WITH LONG-TERM CURRENT USE OF INSULIN (HCC): ICD-10-CM

## 2024-11-27 LAB
ANION GAP SERPL CALCULATED.3IONS-SCNC: 10 MEQ/L (ref 9–15)
BUN SERPL-MCNC: 9 MG/DL (ref 8–23)
CALCIUM SERPL-MCNC: 9 MG/DL (ref 8.5–9.9)
CHLORIDE SERPL-SCNC: 105 MEQ/L (ref 95–107)
CHOLEST SERPL-MCNC: 127 MG/DL (ref 0–199)
CO2 SERPL-SCNC: 27 MEQ/L (ref 20–31)
CREAT SERPL-MCNC: 0.52 MG/DL (ref 0.5–0.9)
ESTIMATED AVERAGE GLUCOSE: 169 MG/DL
GLUCOSE SERPL-MCNC: 129 MG/DL (ref 70–99)
HBA1C MFR BLD: 7.5 % (ref 4–6)
HDLC SERPL-MCNC: 54 MG/DL (ref 40–59)
LDLC SERPL CALC-MCNC: 51 MG/DL (ref 0–129)
POTASSIUM SERPL-SCNC: 3.8 MEQ/L (ref 3.4–4.9)
SODIUM SERPL-SCNC: 142 MEQ/L (ref 135–144)
TRIGL SERPL-MCNC: 110 MG/DL (ref 0–150)

## 2024-12-02 ENCOUNTER — OFFICE VISIT (OUTPATIENT)
Dept: ENDOCRINOLOGY | Age: 64
End: 2024-12-02
Payer: COMMERCIAL

## 2024-12-02 ENCOUNTER — PHARMACY VISIT (OUTPATIENT)
Dept: PHARMACY | Facility: CLINIC | Age: 64
End: 2024-12-02
Payer: MEDICARE

## 2024-12-02 VITALS
HEART RATE: 76 BPM | HEIGHT: 64 IN | OXYGEN SATURATION: 95 % | WEIGHT: 212 LBS | BODY MASS INDEX: 36.19 KG/M2 | SYSTOLIC BLOOD PRESSURE: 117 MMHG | DIASTOLIC BLOOD PRESSURE: 72 MMHG

## 2024-12-02 DIAGNOSIS — E66.01 MORBID OBESITY: ICD-10-CM

## 2024-12-02 DIAGNOSIS — Z79.4 TYPE 2 DIABETES MELLITUS WITH OTHER SPECIFIED COMPLICATION, WITH LONG-TERM CURRENT USE OF INSULIN (HCC): Primary | ICD-10-CM

## 2024-12-02 DIAGNOSIS — E11.69 TYPE 2 DIABETES MELLITUS WITH OTHER SPECIFIED COMPLICATION, WITH LONG-TERM CURRENT USE OF INSULIN (HCC): Primary | ICD-10-CM

## 2024-12-02 LAB
CHP ED QC CHECK: NORMAL
GLUCOSE BLD-MCNC: 201 MG/DL

## 2024-12-02 PROCEDURE — 1123F ACP DISCUSS/DSCN MKR DOCD: CPT | Performed by: INTERNAL MEDICINE

## 2024-12-02 PROCEDURE — 82962 GLUCOSE BLOOD TEST: CPT | Performed by: INTERNAL MEDICINE

## 2024-12-02 PROCEDURE — 3074F SYST BP LT 130 MM HG: CPT | Performed by: INTERNAL MEDICINE

## 2024-12-02 PROCEDURE — 3051F HG A1C>EQUAL 7.0%<8.0%: CPT | Performed by: INTERNAL MEDICINE

## 2024-12-02 PROCEDURE — 99213 OFFICE O/P EST LOW 20 MIN: CPT | Performed by: INTERNAL MEDICINE

## 2024-12-02 PROCEDURE — 1159F MED LIST DOCD IN RCRD: CPT | Performed by: INTERNAL MEDICINE

## 2024-12-02 PROCEDURE — 3078F DIAST BP <80 MM HG: CPT | Performed by: INTERNAL MEDICINE

## 2024-12-02 PROCEDURE — RXMED WILLOW AMBULATORY MEDICATION CHARGE

## 2024-12-02 NOTE — PROGRESS NOTES
12/2/2024    Assessment:       Diagnosis Orders   1. Type 2 diabetes mellitus with other specified complication, with long-term current use of insulin (HCC)  POCT Glucose    Basic Metabolic Panel    Hemoglobin A1C    Microalbumin / Creatinine Urine Ratio    insulin aspart prot & aspart (NOVOLOG 70/30) injection pen      2. Morbid obesity              PLAN:     Orders Placed This Encounter   Procedures    Basic Metabolic Panel     Standing Status:   Future     Standing Expiration Date:   12/2/2025    Hemoglobin A1C     Standing Status:   Future     Standing Expiration Date:   12/2/2025    Microalbumin / Creatinine Urine Ratio     Standing Status:   Future     Standing Expiration Date:   12/2/2025    POCT Glucose     Lower the dose of insulin  Continue ozempic 0.5  mg once a week   Orders Placed This Encounter   Medications    insulin aspart prot & aspart (NOVOLOG 70/30) injection pen     Sig: ADMINISTER 40 UNITS UNDER THE SKIN TWICE DAILY     Dispense:  30 mL     Refill:  3           Orders Placed This Encounter   Procedures    POCT Glucose     No orders of the defined types were placed in this encounter.    No follow-ups on file.  Subjective:     Chief Complaint   Patient presents with    Diabetes     Vitals:    12/02/24 1334   BP: 117/72   Pulse: 76   SpO2: 95%   Weight: 96.2 kg (212 lb)   Height: 1.626 m (5' 4.02\")     Wt Readings from Last 3 Encounters:   12/02/24 96.2 kg (212 lb)   05/30/24 102.5 kg (226 lb)   11/13/23 103.9 kg (229 lb)     BP Readings from Last 3 Encounters:   12/02/24 117/72   05/30/24 113/71   11/13/23 110/67     F/u on type 2 dm obesity on novolog 70/30 50 units bid plus ozempic 0.5 mg once a week     Hbaic lower from 8.9 to 7.4  Hemoglobin A1C       Date                     Value               Ref Range           Status                11/27/2024               7.5 (H)             4.0 - 6.0 %         Final            ----------      Diabetes  She presents for her follow-up diabetic visit. She  ADV PT BELOW INFO-- PLEASE PLACE ORDER FOR QUANTIFERON GOLD TEST. PT WILL COME IN ON SAME DAY.     Patt Johns

## 2024-12-03 ENCOUNTER — APPOINTMENT (OUTPATIENT)
Dept: PRIMARY CARE | Facility: CLINIC | Age: 64
End: 2024-12-03
Payer: COMMERCIAL

## 2024-12-09 ENCOUNTER — APPOINTMENT (OUTPATIENT)
Dept: PHARMACY | Facility: HOSPITAL | Age: 64
End: 2024-12-09
Payer: COMMERCIAL

## 2024-12-09 DIAGNOSIS — I10 ESSENTIAL HYPERTENSION: ICD-10-CM

## 2024-12-09 DIAGNOSIS — Z79.4 TYPE 2 DIABETES MELLITUS WITH HYPERGLYCEMIA, WITH LONG-TERM CURRENT USE OF INSULIN: ICD-10-CM

## 2024-12-09 DIAGNOSIS — Z79.4 TYPE 2 DIABETES MELLITUS WITHOUT COMPLICATION, WITH LONG-TERM CURRENT USE OF INSULIN (MULTI): ICD-10-CM

## 2024-12-09 DIAGNOSIS — J44.9 CHRONIC OBSTRUCTIVE PULMONARY DISEASE, UNSPECIFIED COPD TYPE (MULTI): ICD-10-CM

## 2024-12-09 DIAGNOSIS — E78.2 MIXED HYPERLIPIDEMIA: ICD-10-CM

## 2024-12-09 DIAGNOSIS — Z00.00 ENCOUNTER FOR MEDICARE ANNUAL WELLNESS EXAM: ICD-10-CM

## 2024-12-09 DIAGNOSIS — Z23 NEED FOR VACCINATION: ICD-10-CM

## 2024-12-09 DIAGNOSIS — E11.65 TYPE 2 DIABETES MELLITUS WITH HYPERGLYCEMIA, WITH LONG-TERM CURRENT USE OF INSULIN: ICD-10-CM

## 2024-12-09 DIAGNOSIS — E66.01 MORBID OBESITY (MULTI): ICD-10-CM

## 2024-12-09 DIAGNOSIS — E11.9 TYPE 2 DIABETES MELLITUS WITHOUT COMPLICATION, WITH LONG-TERM CURRENT USE OF INSULIN (MULTI): ICD-10-CM

## 2024-12-09 NOTE — PROGRESS NOTES
Clinical Pharmacy Appointment    Patient ID: Cortney Ruby is a 64 y.o. female who presents for Diabetes.    Pt is here for Follow Up appointment.     Referring Provider: Mayra Lewis, *  PCP: Mayra Lewis MD   Last visit with PCP: 8.28.24   Next visit with PCP: 3.18.25    Subjective   Interval History:  Continuing Ozempic 0.5 mg   SE improving  Significant appetite suppression; reduced portions  Weight loss:  10/31/2024 - 216 pounds  11/12/2024 -  201 pounds  12/9/2024 - 202 pounds  A1c improved to 7.5%  SMBGs mostly at goal - applauded patient for continued efforts    HPI  DIABETES MELLITUS TYPE 2:    Diagnosed with diabetes: > 5 years ago. Known diabetic complications: smoker, HTN, HLD, obesity, COPD.  Does patient follow with Endocrinology: Yes, Dr. Kyle Villalta  Last optometry exam: Not assessed today  Most recent visit in Podiatry: Not assessed today      Current diabetic medications include:  Humalog Mix 75-25 - 40 units subcutaneously BID  Ozempic 0.5 mg subcutaneously once weekly on Mondays    Glucose Readings:  Glucometer/CGM Type: glucometer    Current home BG readings: At goal    Any episodes of hypoglycemia? No, patient denies .  Did patient treat episode of hypoglycemia appropriately? N/A  Does pt have proteinuria? No - UACr 11.3 on 8.27.24    Lifestyle:  Diet: Improving; decreased portions  Physical Activity: Limited    Secondary Prevention:  Statin? Yes - atorvastatin 40 mg daily  ACE-I/ARB? No  Aspirin? No    Pertinent PMH Review:  PMH of Pancreatitis: No  PMH of Retinopathy: No  PMH of Urinary Tract Infections: No  PMH of MTC: No    Drug Interactions  No relevant drug interactions were noted.    Objective   Allergies   Allergen Reactions    Norco [Hydrocodone-Acetaminophen] Nausea/vomiting     Social History     Social History Narrative    Not on file      Medication Review  Current Outpatient Medications   Medication Instructions    amLODIPine (NORVASC) 2.5 mg, oral, Daily  "   atorvastatin (LIPITOR) 40 mg, oral, Daily    BD Ultra-Fine Micro Pen Needle 32 gauge x 1/4\" needle USE TWICE DAILY    Combivent Respimat  mcg/actuation inhaler 1 puff, inhalation, 4 times daily RT    fluticasone-umeclidin-vilanter (Trelegy Ellipta) 100-62.5-25 mcg blister with device 1 puff, inhalation, Daily    HumaLOG Mix 75-25 KwikPen 100 unit/mL (75-25) injection INJECT 50 UNIT Twice daily    ibuprofen 800 mg, oral, Every 8 hours PRN    OneTouch Delica Plus Lancet 33 gauge misc USE DAILY    OneTouch Ultra Test strip TEST BID AND PRN    Ozempic 0.5 mg, subcutaneous, Once Weekly      Vitals  BP Readings from Last 2 Encounters:   10/07/24 119/76   08/27/24 126/66     BMI Readings from Last 1 Encounters:   10/07/24 36.65 kg/m²      Labs  A1C  Lab Results   Component Value Date    HGBA1C 7.5 (H) 11/27/2024    HGBA1C 9.3 (H) 08/27/2024    HGBA1C 8.9 (H) 05/24/2024     BMP  Lab Results   Component Value Date    CALCIUM 9.2 08/27/2024     08/27/2024    K 3.7 08/27/2024    CO2 25 08/27/2024     08/27/2024    BUN 9 08/27/2024    CREATININE 0.61 08/27/2024    EGFR >90 08/27/2024     LFTs  Lab Results   Component Value Date    ALT 24 08/27/2024    AST 18 08/27/2024    ALKPHOS 112 08/27/2024    BILITOT 0.5 08/27/2024     FLP  Lab Results   Component Value Date    TRIG 162 (H) 08/27/2024    CHOL 153 08/27/2024    LDLF 62 01/11/2022    LDLCALC 63 08/27/2024    HDL 57.2 08/27/2024     Urine Microalbumin  Lab Results   Component Value Date    MICROALBCREA 11.3 08/27/2024     Weight Management  Wt Readings from Last 3 Encounters:   10/07/24 99.9 kg (220 lb 3.8 oz)   08/27/24 103 kg (226 lb)   11/22/23 104 kg (230 lb)      There is no height or weight on file to calculate BMI.     Assessment/Plan   Problem List Items Addressed This Visit       Essential hypertension    Mixed hyperlipidemia    Morbid obesity (Multi)    Type 2 diabetes mellitus with hyperglycemia, with long-term current use of insulin     " Patient's goal A1c is < 7%.  Is pt at goal? No, 7.5% on 11.27.24  Patient's SMBGs are mostly at goal.  Rationale for plan: Will plan to continue current Ozempic dose, and decrease Humalog slightly due to 1-2 episodes of hypoglycemia in the past few weeks    Medication Changes:  Continue:  Ozempic 0.5 mg subcutaneously once weekly.   DECREASE:  Humalog Mix 75-25 - 38 units subcutaneously BID    Future Considerations:  Titrate GLP1  Start SGLT2i  Consider ACEi for renoprotective properties  Decrease insulin dosage    Monitoring and Education:  Ozempic Education:   Counseled patient on Ozempic MOA, expectations, side effects, duration of therapy, administration, and monitoring parameters.  Provided detailed dosing and administration counseling to ensure proper technique.   Reviewed Ozempic titration schedule, starting with 0.25 mg once weekly for 4 weeks, then continuing on 0.5 mg once weekly. Pt verbalized understanding.  Counseled patient on the benefits of GLP-1ra, such as cardiovascular risk reduction, glycemic control, and weight loss potential.  Reviewed storage requirements of Ozempic when not in use, and when to administer the medication if a dose is missed.  Advised patient that they may experience improved satiety after meals and portion sizes of meals may be reduced as doses of Ozempic increase.  Counseled patient to avoid foods that are fatty/oily as this may precipitate the nausea/GI upset that may occur with new start Ozempic.          Chronic obstructive pulmonary disease (Multi)     Other Visit Diagnoses       Type 2 diabetes mellitus without complication, with long-term current use of insulin (Multi)        Encounter for Medicare annual wellness exam        Need for vaccination              Clinical Pharmacist follow-up: 1/7/25 @ 1 pm, Telehealth visit    Continue all meds under the continuation of care with the referring provider and clinical pharmacy team.    Thank you,  Luz Sharp,  PharmD  Clinical Pharmacist  [phone]    Verbal consent to manage patient's drug therapy was obtained from the patient. They were informed they may decline to participate or withdraw from participation in pharmacy services at any time.

## 2024-12-09 NOTE — ASSESSMENT & PLAN NOTE
Patient's goal A1c is < 7%.  Is pt at goal? No, 7.5% on 11.27.24  Patient's SMBGs are mostly at goal.  Rationale for plan: Will plan to continue current Ozempic dose, and decrease Humalog slightly due to 1-2 episodes of hypoglycemia in the past few weeks    Medication Changes:  Continue:  Ozempic 0.5 mg subcutaneously once weekly.   DECREASE:  Humalog Mix 75-25 - 38 units subcutaneously BID    Future Considerations:  Titrate GLP1  Start SGLT2i  Consider ACEi for renoprotective properties  Decrease insulin dosage    Monitoring and Education:  Ozempic Education:   Counseled patient on Ozempic MOA, expectations, side effects, duration of therapy, administration, and monitoring parameters.  Provided detailed dosing and administration counseling to ensure proper technique.   Reviewed Ozempic titration schedule, starting with 0.25 mg once weekly for 4 weeks, then continuing on 0.5 mg once weekly. Pt verbalized understanding.  Counseled patient on the benefits of GLP-1ra, such as cardiovascular risk reduction, glycemic control, and weight loss potential.  Reviewed storage requirements of Ozempic when not in use, and when to administer the medication if a dose is missed.  Advised patient that they may experience improved satiety after meals and portion sizes of meals may be reduced as doses of Ozempic increase.  Counseled patient to avoid foods that are fatty/oily as this may precipitate the nausea/GI upset that may occur with new start Ozempic.

## 2024-12-13 ENCOUNTER — APPOINTMENT (OUTPATIENT)
Dept: PRIMARY CARE | Facility: CLINIC | Age: 64
End: 2024-12-13
Payer: COMMERCIAL

## 2024-12-20 RX ORDER — PEN NEEDLE, DIABETIC 32 GX 1/4"
NEEDLE, DISPOSABLE MISCELLANEOUS
Qty: 100 EACH | Refills: 5 | Status: SHIPPED | OUTPATIENT
Start: 2024-12-20

## 2024-12-24 ENCOUNTER — PHARMACY VISIT (OUTPATIENT)
Dept: PHARMACY | Facility: CLINIC | Age: 64
End: 2024-12-24
Payer: MEDICARE

## 2024-12-24 PROCEDURE — RXMED WILLOW AMBULATORY MEDICATION CHARGE

## 2024-12-27 DIAGNOSIS — E78.2 MIXED HYPERLIPIDEMIA: ICD-10-CM

## 2024-12-30 RX ORDER — ATORVASTATIN CALCIUM 40 MG/1
40 TABLET, FILM COATED ORAL DAILY
Qty: 90 TABLET | Refills: 3 | Status: SHIPPED | OUTPATIENT
Start: 2024-12-30

## 2025-01-07 ENCOUNTER — OFFICE VISIT (OUTPATIENT)
Dept: ORTHOPEDIC SURGERY | Facility: CLINIC | Age: 65
End: 2025-01-07
Payer: COMMERCIAL

## 2025-01-07 ENCOUNTER — APPOINTMENT (OUTPATIENT)
Dept: PHARMACY | Facility: HOSPITAL | Age: 65
End: 2025-01-07
Payer: COMMERCIAL

## 2025-01-07 ENCOUNTER — HOSPITAL ENCOUNTER (OUTPATIENT)
Dept: RADIOLOGY | Facility: HOSPITAL | Age: 65
Discharge: HOME | End: 2025-01-07
Payer: COMMERCIAL

## 2025-01-07 DIAGNOSIS — Z79.4 TYPE 2 DIABETES MELLITUS WITH HYPERGLYCEMIA, WITH LONG-TERM CURRENT USE OF INSULIN: ICD-10-CM

## 2025-01-07 DIAGNOSIS — M17.31 POST-TRAUMATIC OSTEOARTHRITIS OF RIGHT KNEE: ICD-10-CM

## 2025-01-07 DIAGNOSIS — E11.9 TYPE 2 DIABETES MELLITUS WITHOUT COMPLICATION, WITH LONG-TERM CURRENT USE OF INSULIN (MULTI): ICD-10-CM

## 2025-01-07 DIAGNOSIS — G89.29 CHRONIC PAIN OF RIGHT KNEE: ICD-10-CM

## 2025-01-07 DIAGNOSIS — Z79.4 TYPE 2 DIABETES MELLITUS WITHOUT COMPLICATION, WITH LONG-TERM CURRENT USE OF INSULIN (MULTI): ICD-10-CM

## 2025-01-07 DIAGNOSIS — I10 ESSENTIAL HYPERTENSION: ICD-10-CM

## 2025-01-07 DIAGNOSIS — Z00.00 ENCOUNTER FOR MEDICARE ANNUAL WELLNESS EXAM: ICD-10-CM

## 2025-01-07 DIAGNOSIS — M23.91 INTERNAL DERANGEMENT OF RIGHT KNEE: Primary | ICD-10-CM

## 2025-01-07 DIAGNOSIS — J44.9 CHRONIC OBSTRUCTIVE PULMONARY DISEASE, UNSPECIFIED COPD TYPE (MULTI): ICD-10-CM

## 2025-01-07 DIAGNOSIS — M25.561 CHRONIC PAIN OF RIGHT KNEE: ICD-10-CM

## 2025-01-07 DIAGNOSIS — E66.01 MORBID OBESITY (MULTI): ICD-10-CM

## 2025-01-07 DIAGNOSIS — M19.90 GENERALIZED ARTHRITIS: ICD-10-CM

## 2025-01-07 DIAGNOSIS — E11.65 TYPE 2 DIABETES MELLITUS WITH HYPERGLYCEMIA, WITH LONG-TERM CURRENT USE OF INSULIN: ICD-10-CM

## 2025-01-07 DIAGNOSIS — S83.241A ACUTE MEDIAL MENISCUS TEAR OF RIGHT KNEE, INITIAL ENCOUNTER: ICD-10-CM

## 2025-01-07 DIAGNOSIS — Z23 NEED FOR VACCINATION: ICD-10-CM

## 2025-01-07 DIAGNOSIS — E78.2 MIXED HYPERLIPIDEMIA: ICD-10-CM

## 2025-01-07 PROCEDURE — 73564 X-RAY EXAM KNEE 4 OR MORE: CPT | Mod: RT

## 2025-01-07 PROCEDURE — 73564 X-RAY EXAM KNEE 4 OR MORE: CPT | Mod: RIGHT SIDE | Performed by: RADIOLOGY

## 2025-01-07 PROCEDURE — L1812 KO ELASTIC W/JOINTS PRE OTS: HCPCS | Performed by: STUDENT IN AN ORGANIZED HEALTH CARE EDUCATION/TRAINING PROGRAM

## 2025-01-07 PROCEDURE — 99214 OFFICE O/P EST MOD 30 MIN: CPT | Performed by: STUDENT IN AN ORGANIZED HEALTH CARE EDUCATION/TRAINING PROGRAM

## 2025-01-07 RX ORDER — IBUPROFEN 800 MG/1
800 TABLET ORAL EVERY 8 HOURS PRN
Qty: 270 TABLET | Refills: 0 | Status: SHIPPED | OUTPATIENT
Start: 2025-01-07

## 2025-01-07 RX ORDER — IBUPROFEN 800 MG/1
800 TABLET ORAL 3 TIMES DAILY
Qty: 30 TABLET | Refills: 0 | Status: SHIPPED | OUTPATIENT
Start: 2025-01-07 | End: 2025-01-17

## 2025-01-07 NOTE — Clinical Note
Patient requesting ibuprofen 800 mg refill due to knee/arthritis pain. It has been prescribed before, about a year ago. Patient is seeing ortho today. Last appt with you was 8/2024 and next appt 3/2025. Pended to you but let me know if patient needs to be seen

## 2025-01-07 NOTE — ASSESSMENT & PLAN NOTE
Patient's goal A1c is < 7%.  Is pt at goal? No, 7.5% on 11.27.24  Patient's SMBGs are at goal.  Rationale for plan: Will plan to continue current therapy today as blood glucose readings at goal.    Medication Changes:  Continue:  Ozempic 0.5 mg subcutaneously once weekly.   Humalog Mix 75-25 - 38 units subcutaneously BID    Future Considerations:  Titrate GLP1  Start SGLT2i  Consider ACEi for renoprotective properties  Decrease insulin dosage    Monitoring and Education:  Ozempic Education:   Counseled patient on Ozempic MOA, expectations, side effects, duration of therapy, administration, and monitoring parameters.  Provided detailed dosing and administration counseling to ensure proper technique.   Reviewed Ozempic titration schedule, starting with 0.25 mg once weekly for 4 weeks, then continuing on 0.5 mg once weekly. Pt verbalized understanding.  Counseled patient on the benefits of GLP-1ra, such as cardiovascular risk reduction, glycemic control, and weight loss potential.  Reviewed storage requirements of Ozempic when not in use, and when to administer the medication if a dose is missed.  Advised patient that they may experience improved satiety after meals and portion sizes of meals may be reduced as doses of Ozempic increase.  Counseled patient to avoid foods that are fatty/oily as this may precipitate the nausea/GI upset that may occur with new start Ozempic.

## 2025-01-07 NOTE — PROGRESS NOTES
Acute Injury Established Patient Visit    HPI: Cortney is a 64 y.o.female who presents today with new complaints of right knee pain for about 2 months.  The pain is anterior and located superior to the patella.  States that she feels pressure when she flexes.  She cannot fully flex knee.  She denies any obvious falls or injuries.  She feels like it could give out.  This keeps her up at night.  She does note some mild swelling.  She also notes some popping.  It is hard to go from standing to sitting.  This makes the pain worse.  The pain radiates up and down the leg.  She has been trying ibuprofen.  Of note, she was treated for a lateral tibial plateau fracture approximately 4 years ago.  She has not had any injections.  She has diabetes and wants to avoid steroids.    Plan: For this right knee internal derangement, concern for medial meniscus tear, and some right posttraumatic osteoarthritis, we will obtain an MRI of the right knee for further evaluation.  Place her in a hinged knee brace.  Start her on ibuprofen 800s to be taken with food.  Follow-up with results of the MRI.  Would consider viscosupplementation if she is not getting better.    Assessment:   Problem List Items Addressed This Visit       Right knee DJD    Relevant Medications    ibuprofen 800 mg tablet    Other Relevant Orders    Knee Brace, Hinged    MR knee right wo IV contrast     Other Visit Diagnoses       Internal derangement of right knee    -  Primary    Relevant Medications    ibuprofen 800 mg tablet    Other Relevant Orders    Knee Brace, Hinged    MR knee right wo IV contrast    Chronic pain of right knee        Relevant Orders    XR knee right 4+ views    Acute medial meniscus tear of right knee, initial encounter        Relevant Medications    ibuprofen 800 mg tablet    Other Relevant Orders    Knee Brace, Hinged    MR knee right wo IV contrast            Diagnostics: Reviewed all relevant imaging including x-ray, MRI, CT, and  US.      Procedure:  Procedures    Physical Exam:  GENERAL:  No obvious acute distress.  NEURO:  Distally neurovascularly intact.  Sensation intact to light touch.  Extremity: Right knee examination shows:  Skin is intact;  No erythema or warmth;  No edema or ecchymosis;  1+ joint effusion;  Can flex the left knee to 90 degrees;  Full extension at 0 degrees;  No pain over the patella;  Negative patellar grind test;  TENDER over the medial joint line;  No pain over the lateral joint line;  No pain over the patellar or quadricep tendon;  No pain over the proximal tibia;  No pain over the popliteal fossa;  Negative valgus stress test;  Negative varus stress test;  POSITIVE Nani's test medially with no instability;  Negative Nani's test laterally with no instability;  Negative Lachman's test;  Patellar and quadricep mechanism intact;  Negative anterior and posterior drawer test;  Negative patellar apprehension test;  Distal pulses are palpable;  Neurovascularly intact; and  Walking with no significant antalgic gait.    Orders Placed This Encounter    Knee Brace, Hinged    XR knee right 4+ views    MR knee right wo IV contrast    ibuprofen 800 mg tablet      At the conclusion of the visit there were no further questions by the patient/family regarding their plan of care.  Patient was instructed to call or return with any issues, questions, or concerns regarding their injury and/or treatment plan described above.     01/07/25 at 4:05 PM - Benny Zazueta DO    Office: [phone]    This note was prepared using voice recognition software.  The details of this note are correct and have been reviewed, and corrected to the best of my ability.  Some grammatical errors may persist related to the Dragon software.

## 2025-01-07 NOTE — PROGRESS NOTES
Clinical Pharmacy Appointment    Patient ID: Cortney Ruby is a 64 y.o. female who presents for Diabetes.    Pt is here for Follow Up appointment.     Referring Provider: Mayra Lewis, *  PCP: Mayra Lewis MD   Last visit with PCP: 8.28.24   Next visit with PCP: 3.18.25    Subjective   Interval History:  ER visit - needed fluid drained from knee; completed however fluid has came back  Difficult to bend knee, painful but tolerating  Has appt with ortho today to discuss  Continuing Ozempic 0.5 mg   SE improving  Significant appetite suppression; reduced portions  Weight loss:  10/31/2024 - 216 pounds  11/12/2024 -  201 pounds  12/9/2024 - 202 pounds  1/7/2025 - has not weighed herself recently  SMBGs mostly at goal - applauded patient for continued efforts  Requesting ibuprofen refill    HPI  DIABETES MELLITUS TYPE 2:    Diagnosed with diabetes: > 5 years ago. Known diabetic complications: smoker, HTN, HLD, obesity, COPD.  Does patient follow with Endocrinology: Yes, Dr. Kyle Villalta  Last optometry exam: Not assessed today  Most recent visit in Podiatry: Not assessed today      Current diabetic medications include:  Humalog Mix 75-25 - 38 units subcutaneously BID  Ozempic 0.5 mg subcutaneously once weekly on Mondays    Glucose Readings:  Glucometer/CGM Type: glucometer    Current home BG readings: At goal    Any episodes of hypoglycemia? No, patient denies .  Did patient treat episode of hypoglycemia appropriately? N/A  Does pt have proteinuria? No - UACr 11.3 on 8.27.24    Lifestyle:  Diet: Improving; decreased portions  Physical Activity: Limited    Secondary Prevention:  Statin? Yes - atorvastatin 40 mg daily  ACE-I/ARB? No  Aspirin? No    Pertinent PMH Review:  PMH of Pancreatitis: No  PMH of Retinopathy: No  PMH of Urinary Tract Infections: No  PMH of MTC: No    Drug Interactions  No relevant drug interactions were noted.    Objective   Allergies   Allergen Reactions    Norco  "[Hydrocodone-Acetaminophen] Nausea/vomiting     Social History     Social History Narrative    Not on file      Medication Review  Current Outpatient Medications   Medication Instructions    amLODIPine (NORVASC) 2.5 mg, oral, Daily    atorvastatin (LIPITOR) 40 mg, oral, Daily    BD Ultra-Fine Micro Pen Needle 32 gauge x 1/4\" needle USE TWICE DAILY    Combivent Respimat  mcg/actuation inhaler 1 puff, inhalation, 4 times daily RT    fluticasone-umeclidin-vilanter (Trelegy Ellipta) 100-62.5-25 mcg blister with device 1 puff, inhalation, Daily    HumaLOG Mix 75-25 KwikPen 100 unit/mL (75-25) injection INJECT 50 UNIT Twice daily    ibuprofen 800 mg, oral, Every 8 hours PRN    OneTouch Delica Plus Lancet 33 gauge misc USE DAILY    OneTouch Ultra Test strip TEST BID AND PRN    Ozempic 0.5 mg, subcutaneous, Once Weekly      Vitals  BP Readings from Last 2 Encounters:   10/07/24 119/76   08/27/24 126/66     BMI Readings from Last 1 Encounters:   10/07/24 36.65 kg/m²      Labs  A1C  Lab Results   Component Value Date    HGBA1C 7.5 (H) 11/27/2024    HGBA1C 9.3 (H) 08/27/2024    HGBA1C 8.9 (H) 05/24/2024     BMP  Lab Results   Component Value Date    CALCIUM 9.2 08/27/2024     08/27/2024    K 3.7 08/27/2024    CO2 25 08/27/2024     08/27/2024    BUN 9 08/27/2024    CREATININE 0.61 08/27/2024    EGFR >90 08/27/2024     LFTs  Lab Results   Component Value Date    ALT 24 08/27/2024    AST 18 08/27/2024    ALKPHOS 112 08/27/2024    BILITOT 0.5 08/27/2024     FLP  Lab Results   Component Value Date    TRIG 162 (H) 08/27/2024    CHOL 153 08/27/2024    LDLF 62 01/11/2022    LDLCALC 63 08/27/2024    HDL 57.2 08/27/2024     Urine Microalbumin  Lab Results   Component Value Date    MICROALBCREA 11.3 08/27/2024     Weight Management  Wt Readings from Last 3 Encounters:   10/07/24 99.9 kg (220 lb 3.8 oz)   08/27/24 103 kg (226 lb)   11/22/23 104 kg (230 lb)      There is no height or weight on file to calculate BMI. "     Assessment/Plan   Problem List Items Addressed This Visit       Essential hypertension    Mixed hyperlipidemia    Morbid obesity (Multi)    Type 2 diabetes mellitus with hyperglycemia, with long-term current use of insulin     Patient's goal A1c is < 7%.  Is pt at goal? No, 7.5% on 11.27.24  Patient's SMBGs are at goal.  Rationale for plan: Will plan to continue current therapy today as blood glucose readings at goal.    Medication Changes:  Continue:  Ozempic 0.5 mg subcutaneously once weekly.   Humalog Mix 75-25 - 38 units subcutaneously BID    Future Considerations:  Titrate GLP1  Start SGLT2i  Consider ACEi for renoprotective properties  Decrease insulin dosage    Monitoring and Education:  Ozempic Education:   Counseled patient on Ozempic MOA, expectations, side effects, duration of therapy, administration, and monitoring parameters.  Provided detailed dosing and administration counseling to ensure proper technique.   Reviewed Ozempic titration schedule, starting with 0.25 mg once weekly for 4 weeks, then continuing on 0.5 mg once weekly. Pt verbalized understanding.  Counseled patient on the benefits of GLP-1ra, such as cardiovascular risk reduction, glycemic control, and weight loss potential.  Reviewed storage requirements of Ozempic when not in use, and when to administer the medication if a dose is missed.  Advised patient that they may experience improved satiety after meals and portion sizes of meals may be reduced as doses of Ozempic increase.  Counseled patient to avoid foods that are fatty/oily as this may precipitate the nausea/GI upset that may occur with new start Ozempic.          Chronic obstructive pulmonary disease (Multi)     Other Visit Diagnoses       Type 2 diabetes mellitus without complication, with long-term current use of insulin (Multi)              Clinical Pharmacist follow-up: 2/11/25 @ 1 pm, Telehealth visit    Continue all meds under the continuation of care with the referring  provider and clinical pharmacy team.    Thank you,  Luz Sharp, PharmD  Clinical Pharmacist  [phone]    Verbal consent to manage patient's drug therapy was obtained from the patient. They were informed they may decline to participate or withdraw from participation in pharmacy services at any time.

## 2025-01-15 ENCOUNTER — HOSPITAL ENCOUNTER (OUTPATIENT)
Dept: RADIOLOGY | Facility: HOSPITAL | Age: 65
Discharge: HOME | End: 2025-01-15
Payer: COMMERCIAL

## 2025-01-15 DIAGNOSIS — S83.241A ACUTE MEDIAL MENISCUS TEAR OF RIGHT KNEE, INITIAL ENCOUNTER: ICD-10-CM

## 2025-01-15 DIAGNOSIS — M17.31 POST-TRAUMATIC OSTEOARTHRITIS OF RIGHT KNEE: ICD-10-CM

## 2025-01-15 DIAGNOSIS — M23.91 INTERNAL DERANGEMENT OF RIGHT KNEE: ICD-10-CM

## 2025-01-15 PROCEDURE — 73721 MRI JNT OF LWR EXTRE W/O DYE: CPT | Mod: RT

## 2025-01-15 PROCEDURE — 73721 MRI JNT OF LWR EXTRE W/O DYE: CPT | Mod: RIGHT SIDE | Performed by: STUDENT IN AN ORGANIZED HEALTH CARE EDUCATION/TRAINING PROGRAM

## 2025-01-18 ENCOUNTER — APPOINTMENT (OUTPATIENT)
Dept: RADIOLOGY | Facility: HOSPITAL | Age: 65
End: 2025-01-18
Payer: COMMERCIAL

## 2025-01-20 ENCOUNTER — APPOINTMENT (OUTPATIENT)
Dept: ORTHOPEDIC SURGERY | Facility: CLINIC | Age: 65
End: 2025-01-20
Payer: COMMERCIAL

## 2025-01-20 DIAGNOSIS — S83.241A ACUTE MEDIAL MENISCUS TEAR OF RIGHT KNEE, INITIAL ENCOUNTER: ICD-10-CM

## 2025-01-20 DIAGNOSIS — M17.31 POST-TRAUMATIC OSTEOARTHRITIS OF RIGHT KNEE: Primary | ICD-10-CM

## 2025-01-20 PROCEDURE — RXMED WILLOW AMBULATORY MEDICATION CHARGE

## 2025-01-20 PROCEDURE — 99213 OFFICE O/P EST LOW 20 MIN: CPT | Performed by: STUDENT IN AN ORGANIZED HEALTH CARE EDUCATION/TRAINING PROGRAM

## 2025-01-20 PROCEDURE — 1159F MED LIST DOCD IN RCRD: CPT | Performed by: STUDENT IN AN ORGANIZED HEALTH CARE EDUCATION/TRAINING PROGRAM

## 2025-01-20 NOTE — PROGRESS NOTES
Acute Injury Established Patient Visit    HPI: Cortney is a 64 y.o.female who presents today for follow-up of her right knee internal derangement MRI results review.  MRI showed a medial meniscus root tear.  She still having significant pain.  She feels like overall she is actually even getting worse.  The brace does not seem to help much.  She is having some catching and feels unsteady.  She continues to have popping, but no locking.  She has pain over the medial aspect of the knee.  She continues to have some intermittent swelling.    On 1/7/2025, she presented with new complaints of right knee pain for about 2 months.  The pain is anterior and located superior to the patella.  States that she feels pressure when she flexes.  She cannot fully flex knee.  She denies any obvious falls or injuries.  She feels like it could give out.  This keeps her up at night.  She does note some mild swelling.  She also notes some popping.  It is hard to go from standing to sitting.  This makes the pain worse.  The pain radiates up and down the leg.  She has been trying ibuprofen.  Of note, she was treated for a lateral tibial plateau fracture approximately 4 years ago.  She has not had any injections.  She has diabetes and wants to avoid steroids.    Plan: Today, on 1/20/2025, for this medial meniscus tear and moderate to severe posttraumatic osteoarthritis, referred her to Dr. Azael Cornelius for further evaluation and management as she is considering possible joint replacement surgery versus arthroscopic meniscectomy.  She would like to have this discussion with the surgeon.    On 1/7/2025, for this right knee internal derangement, concern for medial meniscus tear, and some right posttraumatic osteoarthritis, we will obtain an MRI of the right knee for further evaluation.  Place her in a hinged knee brace.  Start her on ibuprofen 800s to be taken with food.  Follow-up with results of the MRI.  Would consider viscosupplementation if  she is not getting better.    Assessment:   Problem List Items Addressed This Visit       Right knee DJD - Primary     Other Visit Diagnoses       Acute medial meniscus tear of right knee, initial encounter                  Diagnostics: Reviewed all relevant imaging including x-ray, MRI, CT, and US.      Procedure:  Procedures    Physical Exam:  GENERAL:  No obvious acute distress.  NEURO:  Distally neurovascularly intact.  Sensation intact to light touch.  Extremity: Right knee examination shows:  Skin is intact;  No erythema or warmth;  No edema or ecchymosis;  1+ joint effusion;  Can flex the left knee to 90 degrees;  Full extension at 0 degrees;  No pain over the patella;  Negative patellar grind test;  TENDER over the medial joint line;  No pain over the lateral joint line;  No pain over the patellar or quadricep tendon;  No pain over the proximal tibia;  No pain over the popliteal fossa;  Negative valgus stress test;  Negative varus stress test;  POSITIVE Nani's test medially with no instability;  Negative Nani's test laterally with no instability;  Negative Lachman's test;  Patellar and quadricep mechanism intact;  Negative anterior and posterior drawer test;  Negative patellar apprehension test;  Distal pulses are palpable;  Neurovascularly intact; and  Walking with no significant antalgic gait.    No orders of the defined types were placed in this encounter.     At the conclusion of the visit there were no further questions by the patient/family regarding their plan of care.  Patient was instructed to call or return with any issues, questions, or concerns regarding their injury and/or treatment plan described above.     01/20/25 at 2:34 PM - Benny Zazueta DO    Office: [phone]    This note was prepared using voice recognition software.  The details of this note are correct and have been reviewed, and corrected to the best of my ability.  Some grammatical errors may persist related to the  Dragon software.

## 2025-01-21 ENCOUNTER — OFFICE VISIT (OUTPATIENT)
Dept: ORTHOPEDIC SURGERY | Facility: CLINIC | Age: 65
End: 2025-01-21
Payer: COMMERCIAL

## 2025-01-21 DIAGNOSIS — M17.11 PRIMARY OSTEOARTHRITIS OF RIGHT KNEE: Primary | ICD-10-CM

## 2025-01-21 PROCEDURE — 1159F MED LIST DOCD IN RCRD: CPT | Performed by: ORTHOPAEDIC SURGERY

## 2025-01-21 PROCEDURE — 1125F AMNT PAIN NOTED PAIN PRSNT: CPT | Performed by: ORTHOPAEDIC SURGERY

## 2025-01-21 PROCEDURE — 99214 OFFICE O/P EST MOD 30 MIN: CPT | Performed by: ORTHOPAEDIC SURGERY

## 2025-01-21 PROCEDURE — E0143 WALKER FOLDING WHEELED W/O S: HCPCS | Performed by: ORTHOPAEDIC SURGERY

## 2025-01-21 ASSESSMENT — PAIN SCALES - GENERAL: PAINLEVEL_OUTOF10: 9

## 2025-01-21 ASSESSMENT — PAIN - FUNCTIONAL ASSESSMENT: PAIN_FUNCTIONAL_ASSESSMENT: 0-10

## 2025-01-21 NOTE — PROGRESS NOTES
History of Present Illness  Chief Complaint   Patient presents with    Right Knee - Follow-up, Pain     RT KNEE  XR 1-7-25  MRI 1-15-25       The patient presents with side: right knee pain for 2 months.  The patient complains of worsening pain over the past 2 months.  Recently there has been concern for falls and instability.  There is increasing difficulty with activities of daily living and significant disability related to the knee pain.  The patient endorses the following non-operative treatments: Rest, ice, elevation, NSAIDS, and bracing .   There is increasing frustration with persistent pain and swelling and decreasing distance of ambulation.  The patient has difficulty with stairs as well as getting up from the floor.  The patient has difficulty putting on their socks and shoes as well as getting in and out of a car.    She recently had an MRI of the right knee that showed a medial meniscus root tear as well as moderate to severe right knee osteoarthritis most notably in the medial compartment    She is a diabetic her last A1c on 11/27/2024 was 7.5  She is a current smoker    Past Medical History:   Diagnosis Date    Arthritis     Closed fracture of lateral portion of right tibial plateau     COPD (chronic obstructive pulmonary disease) (Multi)     Depression     Diabetes mellitus (Multi)     History of mammogram 07/2016    cat 2    Hyperlipidemia     Hypertension     Internal derangement of knee     Mammogram declined     Pap smear of cervix declined     Pap test, as part of routine gynecological examination 05/2015    wnl, hpv not done    Peripheral vascular disease of foot (CMS-[facility])     Respiratory failure (Multi)     Type 2 diabetes mellitus     Vertigo        Medication Documentation Review Audit       Reviewed by Shelli Duenas LPN (Licensed Nurse) on 01/21/25 at 1320      Medication Order Taking? Sig Documenting Provider Last Dose Status   amLODIPine (Norvasc) 2.5 mg tablet [id number] No Take 1  "tablet (2.5 mg) by mouth once daily. Mayra Lewis MD 10/6/2024 Active   atorvastatin (Lipitor) 40 mg tablet [id number]  Take 1 tablet (40 mg) by mouth once daily. Mayra Lewis MD  Active   BD Ultra-Fine Micro Pen Needle 32 gauge x 1/4\" needle [id number] No USE TWICE DAILY Historical Provider, MD Taking Active   Combivent Respimat  mcg/actuation inhaler [id number] No Inhale 1 puff 4 times a day. Mayra Lewis MD 10/6/2024 Active   fluticasone-umeclidin-vilanter (Trelegy Ellipta) 100-62.5-25 mcg blister with device [id number] No Inhale 1 puff once daily. Mayra Lewis MD Past Month Active   HumaLOG Mix 75-25 KwikPen 100 unit/mL (75-25) injection [id number] No INJECT 50 UNIT Twice daily Historical Provider, MD 10/6/2024 Active   ibuprofen 800 mg tablet [id number]  Take 1 tablet (800 mg) by mouth every 8 hours if needed (pain). Mayra Lewis MD  Active   ibuprofen 800 mg tablet [id number]  Take 1 tablet (800 mg) by mouth 3 times a day for 10 days. Benny Zazueta,    25 2359   OneTouch Delica Plus Lancet 33 gauge misc [id number] No USE DAILY Historical Provider, MD Taking Active   OneTouch Ultra Test strip [id number] No TEST BID AND PRN Historical Provider, MD Taking Active   semaglutide 0.25 mg or 0.5 mg (2 mg/3 mL) pen injector [id number]  Inject 0.5 mg under the skin 1 (one) time per week. Mayra Lewis MD  Active                    Allergies   Allergen Reactions    Norco [Hydrocodone-Acetaminophen] Nausea/vomiting       Social History     Socioeconomic History    Marital status:      Spouse name: Not on file    Number of children: Not on file    Years of education: Not on file    Highest education level: Not on file   Occupational History    Not on file   Tobacco Use    Smoking status: Every Day     Current packs/day: 0.50     Average packs/day: 0.5 packs/day for 20.0 years (10.0 ttl pk-yrs)     Types: Cigarettes    Smokeless tobacco: Never   Vaping " Use    Vaping status: Never Used   Substance and Sexual Activity    Alcohol use: Not Currently    Drug use: Never    Sexual activity: Defer     Comment:    Other Topics Concern    Not on file   Social History Narrative    Not on file     Social Drivers of Health     Financial Resource Strain: Not on file   Food Insecurity: Not on file   Transportation Needs: Not on file   Physical Activity: Not on file   Stress: Not on file   Social Connections: Not on file   Intimate Partner Violence: Not on file   Housing Stability: Not on file       Past Surgical History:   Procedure Laterality Date    CARPAL TUNNEL RELEASE Left     CERVICAL FUSION       SECTION, CLASSIC  1977    1977, 1978    COLONOSCOPY  2018    adenoma-due     CYST REMOVAL      Left Cheek & Right Earlobe    CYSTOSCOPY      Dr. Sanchez-unsure date    LUMBAR DISCECTOMY      Dr. Clay    US ASPIRATION INJECTION INTERMEDIATE JOINT  2021    US ASPIRATION INJECTION INTERMEDIATE JOINT 2021 [facility]    XR CHEST PACEMAKER WITH FLUORO  2023    XR CHEST PACEMAKER WITH FLUORO 2023 [facility]       BMI  36    Pain is described as aching, sore, stiff, and sharp     Location: medial, anterior, patellar  Pain level: 9  Assistive device: is not using any ambulatory assistive devices  History of surgery: no       Review of Systems   GENERAL: Negative for malaise, significant weight loss, fever  MUSCULOSKELETAL: see HPI  NEURO:  Negative     Exam  side: right Knee:  Skin healthy and intact  No gross swelling or ecchymosis  Alignment: mild varus    Correctable: full     Effusion: mild      ROM:  3-95 degrees  mild Crepitus with range of motion  Tenderness to palpation over medial and lateral joint line and with patellar compression      No laxity to valgus stress  No laxity to varus stress  Negative Lachman´s test  Negative posterior drawer test  positive Nani´s test  Logrolling of hip  negative  No pain with internal rotation of the hip  Straight leg raise negative  Neurovascular exam normal distally  2+ DP pulse and good cap refill     Radiographs  MR knee right wo IV contrast  Narrative: Interpreted By:  Rob Lama,   STUDY:  MRI of the right knee without IV contrast; 1/15/2025 11:55 am      INDICATION:  Signs/Symptoms:pain.      COMPARISON:  MRI 01/27/2021.      ACCESSION NUMBER(S):  [id number]      ORDERING CLINICIAN:  ACACIA ROSAS      TECHNIQUE:  MR imaging of the right knee was obtained  without IV contrast.      FINDINGS:  LIGAMENTS AND TENDONS:      ACL: Intact.  PCL: Intact.  MCL: Intact.  LCL complex: Intact.      Quadriceps and patellar tendons: Intact.      MENISCI:      Medial meniscus: There is a tear in the posterior root.  Lateral meniscus: Intact.      JOINTS:      Medial compartment: Small to moderate areas of full-thickness  cartilage defects in the femoral condyle and tibial plateau,  progressed since prior MRI. Lateral compartment: Small areas of  partial-thickness cartilage defects in the femoral condyle and tibial  plateau, new since the prior MRI. Patellofemoral compartment: Small  to moderate areas of near full-thickness cartilage defects in the  medial trochlear groove, similar to slightly worsened since the prior  MRI. No patellar subluxation.      Joint fluid: Large joint effusion with synovitis.  Popliteal cyst: None.      OSSEOUS STRUCTURES:      No focal marrow replacing lesion. No fracture.      SOFT TISSUES:      No muscle atrophy or tear.      Impression: Medial meniscus root tear.      Large joint effusion with synovitis      Progressive tricompartment osteoarthritis, most pronounced and  moderate in the medial compartment.      MACRO:  None      Signed by: Rob Lama 1/15/2025 12:18 PM  Dictation workstation:   [id number]         Assessment  Osteoarthrosis side: right knee      Plan  We discussed with the patient the diagnosis of degenerative  joint disease of the knee.  We reviewed an evidence-based approach to osteoarthritis of the knee.  We strongly encouraged low-impact aerobic activity and non-opioid analgesics.     We discussed temporary pain relief with corticosteroid injections and the associated risks.  We also discussed the conflicting evidence regarding viscosupplementation and potential long-term risks with NSAID´s.  We reviewed the role of bracing for instability and physical therapy for atrophy and gait abnormalities.  We reviewed that the only curative process is for a joint arthroplasty.  The patient elected for     The patient has failed to improve with non-operative modalities.  There is increasing difficulty with activities of daily living and concern for falls.  The patient is endorsing severe pain and disability.       We had a lengthy discussion regarding total knee arthroplasty including the orthopaedic risks, including but not limited to, stiffness, infection, hematoma, early aseptic loosening, neurologic or vascular injury, clicking, difficulty kneeling and incomplete relief of pain.  We reviewed the medical risks, including but not limited to, deep venous thrombosis, pulmonary embolism, and cardiovascular/pulmonary issues.     We discussed the anticipated longevity of the implants and potential for revision surgery.  We also discussed anticoagulation, rehabilitation goals, and the hospital course.  We discussed the likelihood of opioid analgesics and risks associated with them.  The next step is to obtain medical risk stratification and encouraged the pre-operative information seminar at the hospital.  We are happy to provide assistance and counseling if the patient has any additional concerns.       Questions were answered.

## 2025-01-22 ENCOUNTER — TELEPHONE (OUTPATIENT)
Dept: ENDOCRINOLOGY | Age: 65
End: 2025-01-22

## 2025-01-22 ENCOUNTER — PHARMACY VISIT (OUTPATIENT)
Dept: PHARMACY | Facility: CLINIC | Age: 65
End: 2025-01-22
Payer: COMMERCIAL

## 2025-01-22 DIAGNOSIS — Z79.4 TYPE 2 DIABETES MELLITUS WITH OTHER SPECIFIED COMPLICATION, WITH LONG-TERM CURRENT USE OF INSULIN (HCC): ICD-10-CM

## 2025-01-22 DIAGNOSIS — E11.69 TYPE 2 DIABETES MELLITUS WITH OTHER SPECIFIED COMPLICATION, WITH LONG-TERM CURRENT USE OF INSULIN (HCC): ICD-10-CM

## 2025-01-22 NOTE — TELEPHONE ENCOUNTER
Requested Prescriptions     Pending Prescriptions Disp Refills    insulin aspart prot & aspart (NOVOLOG 70/30) injection pen 30 mL 3     Sig: ADMINISTER 40 UNITS UNDER THE SKIN TWICE DAILY

## 2025-01-23 ENCOUNTER — APPOINTMENT (OUTPATIENT)
Dept: RADIOLOGY | Facility: HOSPITAL | Age: 65
End: 2025-01-23
Payer: COMMERCIAL

## 2025-01-30 ENCOUNTER — APPOINTMENT (OUTPATIENT)
Dept: ORTHOPEDIC SURGERY | Facility: CLINIC | Age: 65
End: 2025-01-30
Payer: COMMERCIAL

## 2025-02-10 ENCOUNTER — TELEPHONE (OUTPATIENT)
Dept: ORTHOPEDIC SURGERY | Facility: CLINIC | Age: 65
End: 2025-02-10
Payer: COMMERCIAL

## 2025-02-10 NOTE — TELEPHONE ENCOUNTER
WOULD LIKE TO KNOW IF SOMETHING ELSE CAN BE GIVEN TO HER FOR PAIN HAVING A LOT OF SLEEPLESS NIGHT, PATIENT IS SCHEDULED FOR SURGERY ON 4/9

## 2025-02-11 ENCOUNTER — APPOINTMENT (OUTPATIENT)
Dept: PHARMACY | Facility: HOSPITAL | Age: 65
End: 2025-02-11
Payer: COMMERCIAL

## 2025-02-11 DIAGNOSIS — Z79.4 TYPE 2 DIABETES MELLITUS WITH HYPERGLYCEMIA, WITH LONG-TERM CURRENT USE OF INSULIN: ICD-10-CM

## 2025-02-11 DIAGNOSIS — E11.9 TYPE 2 DIABETES MELLITUS WITHOUT COMPLICATION, WITH LONG-TERM CURRENT USE OF INSULIN (MULTI): ICD-10-CM

## 2025-02-11 DIAGNOSIS — J44.9 CHRONIC OBSTRUCTIVE PULMONARY DISEASE, UNSPECIFIED COPD TYPE (MULTI): ICD-10-CM

## 2025-02-11 DIAGNOSIS — E11.65 TYPE 2 DIABETES MELLITUS WITH HYPERGLYCEMIA, WITH LONG-TERM CURRENT USE OF INSULIN: ICD-10-CM

## 2025-02-11 DIAGNOSIS — E78.2 MIXED HYPERLIPIDEMIA: ICD-10-CM

## 2025-02-11 DIAGNOSIS — I10 ESSENTIAL HYPERTENSION: ICD-10-CM

## 2025-02-11 DIAGNOSIS — Z79.4 TYPE 2 DIABETES MELLITUS WITHOUT COMPLICATION, WITH LONG-TERM CURRENT USE OF INSULIN (MULTI): ICD-10-CM

## 2025-02-11 DIAGNOSIS — Z23 NEED FOR VACCINATION: ICD-10-CM

## 2025-02-11 DIAGNOSIS — Z00.00 ENCOUNTER FOR MEDICARE ANNUAL WELLNESS EXAM: ICD-10-CM

## 2025-02-11 DIAGNOSIS — E66.01 MORBID OBESITY (MULTI): ICD-10-CM

## 2025-02-11 PROCEDURE — RXMED WILLOW AMBULATORY MEDICATION CHARGE

## 2025-02-11 NOTE — PROGRESS NOTES
Clinical Pharmacy Appointment    Patient ID: Cortney Ruby is a 64 y.o. female who presents for Diabetes.    Pt is here for Follow Up appointment.     Referring Provider: Mayra Lewis, *  PCP: Mayra Lewis MD   Last visit with PCP: 8.28.24   Next visit with PCP: 3.18.25    Subjective   Interval History:  Saw Ortho  Requires knee replacement beginning of April  Was hoping to get it done sooner due to increased pain/difficulty sleeping due to pain  Has been taking ibuprofen 800 mg  Has call out to ortho to see if they can prescribe something stronger  Continuing Ozempic 0.5 mg   SE improving  Significant appetite suppression; reduced portions  Weight loss:  10/31/2024 - 216 pounds  11/12/2024 -  201 pounds  12/9/2024 - 202 pounds  1/7/2025 - has not weighed herself recently  SMBGs mostly at goal - applauded patient for continued efforts  Confirmed follow-up with PCP and endocrinologist    HPI  DIABETES MELLITUS TYPE 2:    Diagnosed with diabetes: > 5 years ago. Known diabetic complications: smoker, HTN, HLD, obesity, COPD.  Does patient follow with Endocrinology: Yes, Dr. Kyle Villalta  Last optometry exam: Not assessed today  Most recent visit in Podiatry: Not assessed today      Current diabetic medications include:  Humalog Mix 75-25 - 38 units subcutaneously BID  Ozempic 0.5 mg subcutaneously once weekly on Mondays    Glucose Readings:  Glucometer/CGM Type: glucometer    Current home BG readings: 112, 120, 87    Any episodes of hypoglycemia? No, patient denies .  Did patient treat episode of hypoglycemia appropriately? N/A  Does pt have proteinuria? No - UACr 11.3 on 8.27.24    Lifestyle:  Diet: Improving; decreased portions  Physical Activity: Limited    Secondary Prevention:  Statin? Yes - atorvastatin 40 mg daily  ACE-I/ARB? No  Aspirin? No    Pertinent PMH Review:  PMH of Pancreatitis: No  PMH of Retinopathy: No  PMH of Urinary Tract Infections: No  PMH of MTC: No    Drug  "Interactions  No relevant drug interactions were noted.    Objective   Allergies   Allergen Reactions    Norco [Hydrocodone-Acetaminophen] Nausea/vomiting     Social History     Social History Narrative    Not on file      Medication Review  Current Outpatient Medications   Medication Instructions    amLODIPine (NORVASC) 2.5 mg, oral, Daily    atorvastatin (LIPITOR) 40 mg, oral, Daily    BD Ultra-Fine Micro Pen Needle 32 gauge x 1/4\" needle USE TWICE DAILY    Combivent Respimat  mcg/actuation inhaler 1 puff, inhalation, 4 times daily RT    fluticasone-umeclidin-vilanter (Trelegy Ellipta) 100-62.5-25 mcg blister with device 1 puff, inhalation, Daily    HumaLOG Mix 75-25 KwikPen 100 unit/mL (75-25) injection INJECT 50 UNIT Twice daily    ibuprofen 800 mg, oral, Every 8 hours PRN    OneTouch Delica Plus Lancet 33 gauge misc USE DAILY    OneTouch Ultra Test strip TEST BID AND PRN    Ozempic 0.5 mg, subcutaneous, Once Weekly      Vitals  BP Readings from Last 2 Encounters:   10/07/24 119/76   08/27/24 126/66     BMI Readings from Last 1 Encounters:   10/07/24 36.65 kg/m²      Labs  A1C  Lab Results   Component Value Date    HGBA1C 7.5 (H) 11/27/2024    HGBA1C 9.3 (H) 08/27/2024    HGBA1C 8.9 (H) 05/24/2024     BMP  Lab Results   Component Value Date    CALCIUM 9.2 08/27/2024     08/27/2024    K 3.7 08/27/2024    CO2 25 08/27/2024     08/27/2024    BUN 9 08/27/2024    CREATININE 0.61 08/27/2024    EGFR >90 08/27/2024     LFTs  Lab Results   Component Value Date    ALT 24 08/27/2024    AST 18 08/27/2024    ALKPHOS 112 08/27/2024    BILITOT 0.5 08/27/2024     FLP  Lab Results   Component Value Date    TRIG 162 (H) 08/27/2024    CHOL 153 08/27/2024    LDLF 62 01/11/2022    LDLCALC 63 08/27/2024    HDL 57.2 08/27/2024     Urine Microalbumin  Lab Results   Component Value Date    MICROALBCREA 11.3 08/27/2024     Weight Management  Wt Readings from Last 3 Encounters:   10/07/24 99.9 kg (220 lb 3.8 oz) "   08/27/24 103 kg (226 lb)   11/22/23 104 kg (230 lb)      There is no height or weight on file to calculate BMI.     Assessment/Plan   Problem List Items Addressed This Visit       Essential hypertension    Mixed hyperlipidemia    Morbid obesity (Multi)    Type 2 diabetes mellitus with hyperglycemia, with long-term current use of insulin     Patient's goal A1c is < 7%.  Is pt at goal? No, 7.5% on 11.27.24  Patient's SMBGs are at goal.  Rationale for plan: Will plan to continue current therapy today as blood glucose readings at goal. Depending on labs/follow-up with PCP, additional Ozempic increase may be warranted in the future.    Medication Changes:  Continue:  Ozempic 0.5 mg subcutaneously once weekly.   Humalog Mix 75-25 - 38 units subcutaneously BID    Future Considerations:  Titrate GLP1  Start SGLT2i  Consider ACEi for renoprotective properties  Decrease insulin dosage    Monitoring and Education:  Ozempic Education:   Counseled patient on Ozempic MOA, expectations, side effects, duration of therapy, administration, and monitoring parameters.  Provided detailed dosing and administration counseling to ensure proper technique.   Reviewed Ozempic titration schedule, starting with 0.25 mg once weekly for 4 weeks, then continuing on 0.5 mg once weekly. Pt verbalized understanding.  Counseled patient on the benefits of GLP-1ra, such as cardiovascular risk reduction, glycemic control, and weight loss potential.  Reviewed storage requirements of Ozempic when not in use, and when to administer the medication if a dose is missed.  Advised patient that they may experience improved satiety after meals and portion sizes of meals may be reduced as doses of Ozempic increase.  Counseled patient to avoid foods that are fatty/oily as this may precipitate the nausea/GI upset that may occur with new start Ozempic.          Chronic obstructive pulmonary disease (Multi)     Other Visit Diagnoses       Type 2 diabetes mellitus  without complication, with long-term current use of insulin (Multi)        Encounter for Medicare annual wellness exam        Need for vaccination              Clinical Pharmacist follow-up: 4/1/25 @ 1 pm, Telehealth visit    Continue all meds under the continuation of care with the referring provider and clinical pharmacy team.    Thank you,  Luz Sharp, PharmD  Clinical Pharmacist  [phone]    Verbal consent to manage patient's drug therapy was obtained from the patient. They were informed they may decline to participate or withdraw from participation in pharmacy services at any time.

## 2025-02-12 ENCOUNTER — PHARMACY VISIT (OUTPATIENT)
Dept: PHARMACY | Facility: CLINIC | Age: 65
End: 2025-02-12
Payer: COMMERCIAL

## 2025-02-12 NOTE — TELEPHONE ENCOUNTER
Patient was called and advised we will not prescribe any pain medications before surgery.  She will need to continue with the Ibuprofen 800mg.  We will prescribe pain medications after surgery.   She was advised to try OTC Advil PM.  Patient stated understanding and will call with any other questions or concerns.

## 2025-02-23 DIAGNOSIS — E11.69 TYPE 2 DIABETES MELLITUS WITH OTHER SPECIFIED COMPLICATION, WITH LONG-TERM CURRENT USE OF INSULIN (HCC): ICD-10-CM

## 2025-02-23 DIAGNOSIS — Z79.4 TYPE 2 DIABETES MELLITUS WITH OTHER SPECIFIED COMPLICATION, WITH LONG-TERM CURRENT USE OF INSULIN (HCC): ICD-10-CM

## 2025-02-24 RX ORDER — BLOOD SUGAR DIAGNOSTIC
STRIP MISCELLANEOUS
Qty: 100 STRIP | Refills: 3 | Status: SHIPPED | OUTPATIENT
Start: 2025-02-24

## 2025-02-24 RX ORDER — LANCETS 33 GAUGE
EACH MISCELLANEOUS
Qty: 100 EACH | Refills: 3 | Status: SHIPPED | OUTPATIENT
Start: 2025-02-24

## 2025-03-05 ENCOUNTER — TELEPHONE (OUTPATIENT)
Dept: ENDOCRINOLOGY | Age: 65
End: 2025-03-05

## 2025-03-12 PROCEDURE — RXMED WILLOW AMBULATORY MEDICATION CHARGE

## 2025-03-17 ENCOUNTER — PHARMACY VISIT (OUTPATIENT)
Dept: PHARMACY | Facility: CLINIC | Age: 65
End: 2025-03-17
Payer: COMMERCIAL

## 2025-03-18 ENCOUNTER — APPOINTMENT (OUTPATIENT)
Dept: PRIMARY CARE | Facility: CLINIC | Age: 65
End: 2025-03-18
Payer: COMMERCIAL

## 2025-03-18 VITALS
BODY MASS INDEX: 35.32 KG/M2 | RESPIRATION RATE: 16 BRPM | DIASTOLIC BLOOD PRESSURE: 66 MMHG | HEART RATE: 94 BPM | HEIGHT: 65 IN | WEIGHT: 212 LBS | TEMPERATURE: 97.5 F | SYSTOLIC BLOOD PRESSURE: 126 MMHG | OXYGEN SATURATION: 96 %

## 2025-03-18 DIAGNOSIS — E11.65 TYPE 2 DIABETES MELLITUS WITH HYPERGLYCEMIA, WITH LONG-TERM CURRENT USE OF INSULIN: ICD-10-CM

## 2025-03-18 DIAGNOSIS — J44.9 CHRONIC OBSTRUCTIVE PULMONARY DISEASE, UNSPECIFIED COPD TYPE (MULTI): ICD-10-CM

## 2025-03-18 DIAGNOSIS — E66.01 MORBID OBESITY (MULTI): ICD-10-CM

## 2025-03-18 DIAGNOSIS — Z79.4 TYPE 2 DIABETES MELLITUS WITH OTHER SPECIFIED COMPLICATION, WITH LONG-TERM CURRENT USE OF INSULIN: ICD-10-CM

## 2025-03-18 DIAGNOSIS — M17.11 UNILATERAL PRIMARY OSTEOARTHRITIS, RIGHT KNEE: ICD-10-CM

## 2025-03-18 DIAGNOSIS — Z79.4 TYPE 2 DIABETES MELLITUS WITH HYPERGLYCEMIA, WITH LONG-TERM CURRENT USE OF INSULIN: ICD-10-CM

## 2025-03-18 DIAGNOSIS — I51.89 DIASTOLIC DYSFUNCTION: ICD-10-CM

## 2025-03-18 DIAGNOSIS — I10 ESSENTIAL HYPERTENSION: ICD-10-CM

## 2025-03-18 DIAGNOSIS — E11.69 TYPE 2 DIABETES MELLITUS WITH OTHER SPECIFIED COMPLICATION, WITH LONG-TERM CURRENT USE OF INSULIN: ICD-10-CM

## 2025-03-18 DIAGNOSIS — R94.31 ABNORMAL EKG: ICD-10-CM

## 2025-03-18 DIAGNOSIS — Z01.818 PRE-OP EXAMINATION: Primary | ICD-10-CM

## 2025-03-18 DIAGNOSIS — F17.210 CIGARETTE NICOTINE DEPENDENCE WITHOUT COMPLICATION: ICD-10-CM

## 2025-03-18 DIAGNOSIS — F32.1 CURRENT MODERATE EPISODE OF MAJOR DEPRESSIVE DISORDER, UNSPECIFIED WHETHER RECURRENT (MULTI): ICD-10-CM

## 2025-03-18 DIAGNOSIS — E78.2 MIXED HYPERLIPIDEMIA: ICD-10-CM

## 2025-03-18 PROCEDURE — 4004F PT TOBACCO SCREEN RCVD TLK: CPT | Performed by: FAMILY MEDICINE

## 2025-03-18 PROCEDURE — 93000 ELECTROCARDIOGRAM COMPLETE: CPT | Performed by: FAMILY MEDICINE

## 2025-03-18 PROCEDURE — 3078F DIAST BP <80 MM HG: CPT | Performed by: FAMILY MEDICINE

## 2025-03-18 PROCEDURE — 1160F RVW MEDS BY RX/DR IN RCRD: CPT | Performed by: FAMILY MEDICINE

## 2025-03-18 PROCEDURE — 99214 OFFICE O/P EST MOD 30 MIN: CPT | Performed by: FAMILY MEDICINE

## 2025-03-18 PROCEDURE — 3008F BODY MASS INDEX DOCD: CPT | Performed by: FAMILY MEDICINE

## 2025-03-18 PROCEDURE — 3074F SYST BP LT 130 MM HG: CPT | Performed by: FAMILY MEDICINE

## 2025-03-18 PROCEDURE — 1159F MED LIST DOCD IN RCRD: CPT | Performed by: FAMILY MEDICINE

## 2025-03-18 RX ORDER — INSULIN ASPART 100 [IU]/ML
INJECTION, SUSPENSION SUBCUTANEOUS
COMMUNITY
Start: 2025-03-11

## 2025-03-18 NOTE — PROGRESS NOTES
Covid vax: x 4  Flu: n/a  Pneumo: UTD  RSV: advised  Shingles: advised    CRC: due-advised  Mammogram: declines  Pap: n/a  Lmp: n/a

## 2025-03-18 NOTE — H&P (VIEW-ONLY)
Subjective   Patient ID: Cortney Ruby is a 64 y.o. female who presents for FOLLOW UP VISIT.  Covid vax: x 4  Flu: n/a  Pneumo: UTD  RSV: advised  Shingles: advised     CRC: due-advised  Mammogram: declines  Pap: n/a  Lmp: n/a   LAST hba1c 7.5 and due  Ldl 51  Abn EKG  Doesn't exercise   NO CP no SOB  No cardiac sxs  NL stress in   Mild elevated ccs    HPI  Patient Active Problem List   Diagnosis    Actinic keratosis    Bilateral calcaneal spurs    Carpal tunnel syndrome    Cubital tunnel syndrome on left    Current smoker    Cervical radiculopathy    DJD (degenerative joint disease), cervical    Essential hypertension    First degree AV block    Gall stones    Generalized arthritis    Hematuria of unknown etiology    Mixed hyperlipidemia    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[facility])    Prolonged QT interval    Morbid obesity (Multi)    Right knee DJD    Type 2 diabetes mellitus with hyperglycemia, with long-term current use of insulin    Chronic obstructive pulmonary disease (Multi)    Tubular adenoma of colon    Colon cancer screening    Cheek mass    Mass of soft tissue of elbow    Trigger finger, left middle finger    Unilateral primary osteoarthritis, right knee       Past Surgical History:   Procedure Laterality Date    CARPAL TUNNEL RELEASE Left     CERVICAL FUSION       SECTION, CLASSIC  1977    ,     COLONOSCOPY  2018    adenoma-due     CYST REMOVAL  2017    Left Cheek & Right Earlobe    CYSTOSCOPY      Dr. Sanchez-unsure date    LUMBAR DISCECTOMY      Dr. Clay    US ASPIRATION INJECTION INTERMEDIATE JOINT  2021    US ASPIRATION INJECTION INTERMEDIATE JOINT 2021 [facility]    XR CHEST PACEMAKER WITH FLUORO  2023    XR CHEST PACEMAKER WITH FLUORO 2023 [facility]       Review of Systems  This patient has  NO history of seizures/ +CAD no CVA    NO history of recent Covid nor flu symptoms,    NO Fever nor chills today,    NO Chest pain, shortness  "of breath nor paroxysmal nocturnal dyspnea,  NO Nausea, vomiting, nor diarrhea,  NO Hematochezia nor melena,  NO Dysuria, hematuria, nor new incontinence issues  NO new severe headaches nor neurological complaints,  NO new issues with anxiety nor depression nor new psychiatric complaints,  NO suicidal nor homicidal ideations.     OBJECTIVE:  /66   Pulse 94   Temp 36.4 °C (97.5 °F) (Temporal)   Resp 16   Ht 1.651 m (5' 5\")   Wt 96.2 kg (212 lb)   SpO2 96%   BMI 35.28 kg/m²      General:  alert, oriented, no acute distress.  No obvious skin rashes noted.   No gait disturbance noted/baseline gait.    Mood is pleasant,  no signs of emotional distress.   Not appearing intoxicated or altered.   No voiced delusions,   Normal, appropriate behavior.    HEENT: Normocephalic, atraumatic,   Pupils round, reactive to light  Extraocular motions intact and wnl  Tympanic membranes normal    Neck: no nuchal rigidity  No masses palpable.  No carotid bruits.  No thyromegaly.    Respiratory: Equal breath sounds  No wheezes,    rales,    nor rhonchi  No respiratory distress.    Heart: Regular rate and rhythm, no    murmurs  no rubs/gallops    Abdomen: no masses palpable, nontender, no rebound nor guarding.    Extremities: NO cyanosis noted, no clubbing.   No edema noted.  2+dorsalis pedis pulses.    Normal-not antalgic, steady gait. W cane  Crepitus on r knee      No visits with results within 3 Month(s) from this visit.   Latest known visit with results is:   Admission on 10/07/2024, Discharged on 10/07/2024   Component Date Value Ref Range Status    POCT Glucose 10/07/2024 132 (H)  74 - 99 mg/dL Final        Assessment/Plan     Problem List Items Addressed This Visit       Essential hypertension    Mixed hyperlipidemia    Type 2 diabetes mellitus with hyperglycemia, with long-term current use of insulin    Chronic obstructive pulmonary disease (Multi)    Unilateral primary osteoarthritis, right knee     Other Visit " Diagnoses       Pre-op examination        Relevant Orders    ECG 12 lead (Clinic Performed)        Sees ENDO  Will see cardio again soon  Seeing ortho  Dc smoking advised  Admits behind on ct lung  No gb sxs  And again had a lengthy discussion w pt  about risks of poorly controlled diabetes including micro and macrovascular complications of DM2 including blindness,MI,CVA and death among other possibilities. Pt aware and agrees to better -or if good control-continued compliance and adherance to instructions such as regular eye exams q 1-2 y, foot exams,and f/u regularly for hba1c with a goal of 6.5.    Follow up as planned for hba1c and BP checks REGULARLY.  ACCEPTABLE RISK FOR SURGERY IF LABS OK AND CARDIO SAYS OK  Will see ophtho soon    Follow up at next scheduled visit -as planned or directed today.  Sooner if new or unresolved issues of concern.

## 2025-03-26 ENCOUNTER — PRE-ADMISSION TESTING (OUTPATIENT)
Dept: PREADMISSION TESTING | Facility: HOSPITAL | Age: 65
End: 2025-03-26
Payer: COMMERCIAL

## 2025-03-26 VITALS
HEIGHT: 65 IN | SYSTOLIC BLOOD PRESSURE: 133 MMHG | WEIGHT: 213.63 LBS | RESPIRATION RATE: 16 BRPM | OXYGEN SATURATION: 99 % | HEART RATE: 82 BPM | DIASTOLIC BLOOD PRESSURE: 72 MMHG | BODY MASS INDEX: 35.59 KG/M2

## 2025-03-26 DIAGNOSIS — M25.69 STIFFNESS OF OTHER SPECIFIED JOINT, NOT ELSEWHERE CLASSIFIED: ICD-10-CM

## 2025-03-26 DIAGNOSIS — E11.65 TYPE 2 DIABETES MELLITUS WITH HYPERGLYCEMIA (MULTI): Primary | ICD-10-CM

## 2025-03-26 DIAGNOSIS — E11.9 TYPE 2 DIABETES MELLITUS WITHOUT COMPLICATION, UNSPECIFIED WHETHER LONG TERM INSULIN USE: ICD-10-CM

## 2025-03-26 DIAGNOSIS — M17.9 OSTEOARTHROSIS OF KNEE: ICD-10-CM

## 2025-03-26 DIAGNOSIS — D64.9 ANEMIA, UNSPECIFIED TYPE: ICD-10-CM

## 2025-03-26 DIAGNOSIS — E66.01 MORBID (SEVERE) OBESITY DUE TO EXCESS CALORIES (MULTI): ICD-10-CM

## 2025-03-26 LAB
ALBUMIN SERPL BCP-MCNC: 4.1 G/DL (ref 3.4–5)
ALP SERPL-CCNC: 83 U/L (ref 33–136)
ALT SERPL W P-5'-P-CCNC: 25 U/L (ref 7–45)
ANION GAP SERPL CALC-SCNC: 10 MMOL/L (ref 10–20)
AST SERPL W P-5'-P-CCNC: 18 U/L (ref 9–39)
BASOPHILS # BLD AUTO: 0.05 X10*3/UL (ref 0–0.1)
BASOPHILS NFR BLD AUTO: 0.7 %
BILIRUB SERPL-MCNC: 0.7 MG/DL (ref 0–1.2)
BUN SERPL-MCNC: 11 MG/DL (ref 6–23)
CALCIUM SERPL-MCNC: 9.2 MG/DL (ref 8.6–10.3)
CHLORIDE SERPL-SCNC: 108 MMOL/L (ref 98–107)
CHOLEST SERPL-MCNC: 144 MG/DL (ref 0–199)
CHOLESTEROL/HDL RATIO: 2.8
CO2 SERPL-SCNC: 26 MMOL/L (ref 21–32)
CREAT SERPL-MCNC: 0.61 MG/DL (ref 0.5–1.05)
EGFRCR SERPLBLD CKD-EPI 2021: >90 ML/MIN/1.73M*2
EOSINOPHIL # BLD AUTO: 0.29 X10*3/UL (ref 0–0.7)
EOSINOPHIL NFR BLD AUTO: 4.2 %
ERYTHROCYTE [DISTWIDTH] IN BLOOD BY AUTOMATED COUNT: 13.2 % (ref 11.5–14.5)
EST. AVERAGE GLUCOSE BLD GHB EST-MCNC: 126 MG/DL
GLUCOSE SERPL-MCNC: 178 MG/DL (ref 74–99)
HBA1C MFR BLD: 6 %
HCT VFR BLD AUTO: 43.5 % (ref 36–46)
HDLC SERPL-MCNC: 51.1 MG/DL
HGB BLD-MCNC: 14.3 G/DL (ref 12–16)
IMM GRANULOCYTES # BLD AUTO: 0.01 X10*3/UL (ref 0–0.7)
IMM GRANULOCYTES NFR BLD AUTO: 0.1 % (ref 0–0.9)
INR PPP: 1.1 (ref 0.9–1.1)
LDLC SERPL CALC-MCNC: 52 MG/DL
LYMPHOCYTES # BLD AUTO: 2.69 X10*3/UL (ref 1.2–4.8)
LYMPHOCYTES NFR BLD AUTO: 39.2 %
MAGNESIUM SERPL-MCNC: 1.91 MG/DL (ref 1.6–2.4)
MCH RBC QN AUTO: 29.1 PG (ref 26–34)
MCHC RBC AUTO-ENTMCNC: 32.9 G/DL (ref 32–36)
MCV RBC AUTO: 89 FL (ref 80–100)
MONOCYTES # BLD AUTO: 0.4 X10*3/UL (ref 0.1–1)
MONOCYTES NFR BLD AUTO: 5.8 %
NEUTROPHILS # BLD AUTO: 3.43 X10*3/UL (ref 1.2–7.7)
NEUTROPHILS NFR BLD AUTO: 50 %
NON HDL CHOLESTEROL: 93 MG/DL (ref 0–149)
NRBC BLD-RTO: 0 /100 WBCS (ref 0–0)
PLATELET # BLD AUTO: 272 X10*3/UL (ref 150–450)
POTASSIUM SERPL-SCNC: 3.6 MMOL/L (ref 3.5–5.3)
PROT SERPL-MCNC: 6.5 G/DL (ref 6.4–8.2)
PROTHROMBIN TIME: 11.6 SECONDS (ref 9.8–12.4)
RBC # BLD AUTO: 4.91 X10*6/UL (ref 4–5.2)
SODIUM SERPL-SCNC: 140 MMOL/L (ref 136–145)
TRIGL SERPL-MCNC: 206 MG/DL (ref 0–149)
TSH SERPL-ACNC: 1.01 MIU/L (ref 0.44–3.98)
VLDL: 41 MG/DL (ref 0–40)
WBC # BLD AUTO: 6.9 X10*3/UL (ref 4.4–11.3)

## 2025-03-26 PROCEDURE — 83735 ASSAY OF MAGNESIUM: CPT

## 2025-03-26 PROCEDURE — 84443 ASSAY THYROID STIM HORMONE: CPT

## 2025-03-26 PROCEDURE — 80061 LIPID PANEL: CPT

## 2025-03-26 PROCEDURE — 85610 PROTHROMBIN TIME: CPT

## 2025-03-26 PROCEDURE — 85025 COMPLETE CBC W/AUTO DIFF WBC: CPT

## 2025-03-26 PROCEDURE — 83036 HEMOGLOBIN GLYCOSYLATED A1C: CPT | Mod: ELYLAB

## 2025-03-26 PROCEDURE — 80053 COMPREHEN METABOLIC PANEL: CPT

## 2025-03-26 PROCEDURE — 36415 COLL VENOUS BLD VENIPUNCTURE: CPT

## 2025-03-26 PROCEDURE — 87081 CULTURE SCREEN ONLY: CPT | Mod: ELYLAB

## 2025-03-26 NOTE — PREPROCEDURE INSTRUCTIONS
KNEE & HIP JOINT REPLACEMENT PRE-OPERATIVE INSTRUCTIONS     You will receive notification one business day prior to your surgery to confirm your arrival time and any additional information between 2 P.M. - 5 P.M. It is important that you answer your phone and/or check your messages during this time.     You may see in [facility] [place] your surgery start time change several times even up to the day before your procedure. Please disregard those times and only follow the time given by the  who will be notifying you via phone and not a text.     Please arrive at your scheduled time to avoid delay or cancelled surgery.     Please enter the building through either the Main Entrance in front of the hospital or from the Parking Garage Walk Way Bridge. From the parking garage, which is free, take the 2nd Floor Walkway Bridge into the hospital and check in at the [facility] Outpatient Desk as you enter the hospital directly in front of you. If you enter through the Main Entrance take the elevator off the lobby on the right labeled “A” to the 2nd floor and check in at the [facility]patient Surgery desk as you exit the elevator. Wheelchairs are available for use if using the Main Entrance.     Handicap parking in the land lot, in front of hospital by main entrance, wheelchairs are available at this entrance     ?   INSTRUCTIONS:   Please contact your doctor’s office, who is doing procedure, about any changes in your health, bad cold, fever, sore throat, or COVID within last 4 weeks     Talk to your surgeon for instructions if you should stop your aspirin, blood thinner, diabetes medicines, weight loss medications, multivitamins or over the counter supplements since many surgeons have you adjust or stop these medications prior to procedure. The doctor’s office may have you contact the prescribing doctor for medication adjustments for your surgery.     If you take certain medications like Beta Blocker or Anti-Seizure  medication, you may have to take them the morning of procedure with a sip of water. If this is the case your surgeons office should let you know, and the PAT nurses will follow up when they speak to you to verify you are aware.     If not staying overnight after your surgery, and you are receiving any type of anesthesia with your surgery, you must have an adult (age 18 or older) immediately available to drive you home after surgery or your procedure will be cancelled. You may be discharged home after surgery per an Uber, Lyft, Taxi or any other transportation service as long as the responsible adult (18 or older) is in the vehicle with you at time of discharge. The  of these transportation services is not responsible for your care and cannot be consider a responsible adult. We also highly recommend you have someone stay with you for the entire day and night of your surgery.     All jewelry and piercings must be removed. If you are unable to remove an item or have a dermal piercing, please be sure to tell the nurse when you arrive for surgery.     Nail polish must be removed off one finger of each hand     Make-up or other beauty products (lotion, deodorant, hairspray, perfume, etc.) must be removed or not used for day of surgery.     Avoid smoking, consuming alcohol, or any medical or recreational drug use for 24 hours before surgery.     Do not wear contacts to hospital, bring your glasses and a case     Leave valuables at home except photo ID, insurance card and any co-payment that has been requested by hospital.     For adults who are unable to consent or make medical decisions for themselves, a legal guardian or Power of  must accompany them to the surgery center. If this is not possible, please call [phone] to make additional arrangements.  If there is any guardianship or legal Power of  paperwork, please bring them the day of surgery.     Wear comfortable, loose fitting clothing into  the procedure.  While your admitted you are asked to bring short sleeve shirts, shorts (loose like pajamas, sweat shorts), and tennis shoes/sneakers.  No slip on shoes.     Please bring your 2-Wheeled Walker with you the day of surgery and the [facility] Booklet that was given to you during your PAT appointment.     The nurse practitioners, , , physical therapist, and occupational therapist will all discuss and work with you during your stay to help with discharge, physical therapy and any other needs. They also may discuss a program called Meds to Beds, where postoperative medications prescribed to you after surgery will be filled and delivered to your beside before you leave, so that you do not need to stop at the pharmacy on the way home    If you use a CPAP or BIPAP, please make sure the PAT nurse was able to get the settings from you, if not please inform the nurses the day of surgery of your settings you use at home.     Additional instructions about eating and drinking before surgery:     Do not eat any solid foods?or drink anything after midnight the night prior to your procedure. Milk, nutritional drinks/supplements, and infant formula are considered solid foods.  This also includes no gum, candy, lozenges, ice, or any other oral consumption.     CHG SOAP & ORAL RINSE   In regards to bathing, please follow the instructions explained to you at the PAT appointment about taking a showering with the CHG soap the 5 nights prior to your surgery.       During your PAT Appointment you were given [employer] CHG Wash Soap (bottles of bubble gum pink soap) to use before procedure.  Begin using the CHG soap 5 days before your scheduled surgery.  Be sure to sleep on clean sheets - change your sheets the first night you do this cleansing process (you don't not need to change them every night).     CHG SOAP INSTRUCTIONS:  Begin using the CHG soap 5 days prior to your scheduled  surgery.  You will wash and rinse your face and genitals with normal soap.  The night before surgery is the ONLY TIME you use the CHG SOAP for your hair, and do NOT use conditioner after washing with the CHG Soap.  For the rest of the showers the 5 days prior to surgery you will use normal shampoo.  Hair extensions should be removed.  Then apply CHG soap solution to a clean wet washcloth.     Turn the water off or move away from the water spray to avoid premature rinsing of the CHG soap as you apply it.   Avoid getting the CHG soap in your ears, eyes, and mouth.  Apply the CHG soap to entire body from the neck down EXCEPT your face and genitals.  Allow the CHG soap to sit for 3 minutes on your skin.  Then turn on water and rinse the CHG solution off your body completely.    DO NOT wash with regular soap after you have used the CHG soap   Pat yourself dry with a clean, fresh-laundered towel when you get out of the shower and clean Pj's  DO NOT apply any powders, deodorants, or lotions after shower   Be aware the CHG soap will stain fabrics if you wash them with bleach after use.   Make sure to pay special attention to cleaning area(s) where your incision(s) will be located. Avoid scrubbing your skin to hard.  You will repeat this same process steps 1-12 for each shower you take prior to surgery.  The morning of  the surgery is the fifth day.      ORAL RINSE   You will receive a call or notification from your pharmacy to  a prescription prior to procedure.  Be sure to  the prescription oral rinse from your local pharmacy.   You will be using the oral rinse the night before and the morning of surgery.    Take a cap full of the solution, 15mL   Swish (gargle if you can) the mouthwash in your mouth for at least 30 seconds, (DO NOT SWALLOW), and spit out   After the oral CHG rinse, do not rinse your mouth with water, drink, or eat.      If you have to take a medication the morning of surgery as instructed by  your surgeon- please take that medication with a sip of water prior to doing the oral rinse the day of surgery.       ?If you have any questions or concerns, please call Pre-Admission Testing at [phone] or your Physician’s office   Dr. Azael Cornelius  [phone]  [facility] General Line: [phone]

## 2025-03-28 LAB — STAPHYLOCOCCUS SPEC CULT: ABNORMAL

## 2025-03-31 DIAGNOSIS — Z01.818 PRE-OPERATIVE CLEARANCE: Primary | ICD-10-CM

## 2025-03-31 RX ORDER — CHLORHEXIDINE GLUCONATE ORAL RINSE 1.2 MG/ML
15 SOLUTION DENTAL DAILY
Qty: 30 ML | Refills: 0 | Status: SHIPPED | OUTPATIENT
Start: 2025-03-31 | End: 2025-04-02

## 2025-04-01 ENCOUNTER — HOSPITAL ENCOUNTER (OUTPATIENT)
Dept: RADIOLOGY | Facility: HOSPITAL | Age: 65
Discharge: HOME | End: 2025-04-01
Payer: COMMERCIAL

## 2025-04-01 ENCOUNTER — APPOINTMENT (OUTPATIENT)
Dept: PHARMACY | Facility: HOSPITAL | Age: 65
End: 2025-04-01
Payer: COMMERCIAL

## 2025-04-01 DIAGNOSIS — I10 ESSENTIAL HYPERTENSION: ICD-10-CM

## 2025-04-01 DIAGNOSIS — E11.9 TYPE 2 DIABETES MELLITUS WITHOUT COMPLICATION, WITH LONG-TERM CURRENT USE OF INSULIN: ICD-10-CM

## 2025-04-01 DIAGNOSIS — E66.01 MORBID OBESITY (MULTI): ICD-10-CM

## 2025-04-01 DIAGNOSIS — F17.210 CIGARETTE NICOTINE DEPENDENCE WITHOUT COMPLICATION: ICD-10-CM

## 2025-04-01 DIAGNOSIS — Z00.00 ENCOUNTER FOR MEDICARE ANNUAL WELLNESS EXAM: ICD-10-CM

## 2025-04-01 DIAGNOSIS — Z79.4 TYPE 2 DIABETES MELLITUS WITH HYPERGLYCEMIA, WITH LONG-TERM CURRENT USE OF INSULIN: ICD-10-CM

## 2025-04-01 DIAGNOSIS — E11.65 TYPE 2 DIABETES MELLITUS WITH HYPERGLYCEMIA, WITH LONG-TERM CURRENT USE OF INSULIN: ICD-10-CM

## 2025-04-01 DIAGNOSIS — Z79.4 TYPE 2 DIABETES MELLITUS WITHOUT COMPLICATION, WITH LONG-TERM CURRENT USE OF INSULIN: ICD-10-CM

## 2025-04-01 DIAGNOSIS — E78.2 MIXED HYPERLIPIDEMIA: ICD-10-CM

## 2025-04-01 DIAGNOSIS — J44.9 CHRONIC OBSTRUCTIVE PULMONARY DISEASE, UNSPECIFIED COPD TYPE (MULTI): ICD-10-CM

## 2025-04-01 DIAGNOSIS — Z23 NEED FOR VACCINATION: ICD-10-CM

## 2025-04-01 PROCEDURE — 71271 CT THORAX LUNG CANCER SCR C-: CPT

## 2025-04-01 PROCEDURE — 71271 CT THORAX LUNG CANCER SCR C-: CPT | Performed by: RADIOLOGY

## 2025-04-01 NOTE — PROGRESS NOTES
Clinical Pharmacy Appointment    Patient ID: Cortney Ruby is a 64 y.o. female who presents for Diabetes.    Pt is here for Follow Up appointment.     Referring Provider: Mayra Lewis, *  PCP: Mayra Lewis MD   Last visit with PCP: 3.18.25   Next visit with PCP: 9.19.25    Subjective   Interval History:  Knee replacement scheduled for beginning of April  Saw PCP/labs  A1c decreased to 6%  DM  Continuing Ozempic 0.5 mg (holding for surgery though)  SE: denies  Significant appetite suppression; reduced portions  Weight loss fluctuating  SMBGs mostly at goal - applauded patient for continued efforts    HPI  DIABETES MELLITUS TYPE 2:    Diagnosed with diabetes: > 5 years ago. Known diabetic complications: smoker, HTN, HLD, obesity, COPD.  Does patient follow with Endocrinology: Yes, Dr. Kyle Villalta  Last optometry exam: Not assessed today  Most recent visit in Podiatry: Not assessed today      Current diabetic medications include:  Humalog Mix 75-25 - 38 units subcutaneously BID  Ozempic 0.5 mg subcutaneously once weekly on Mondays    Glucose Readings:  Glucometer/CGM Type: glucometer    Current home BG readings: At goal    Any episodes of hypoglycemia? No, patient denies .  Did patient treat episode of hypoglycemia appropriately? N/A  Does pt have proteinuria? No - UACr 11.3 on 8.27.24    Lifestyle:  Diet: Improving; decreased portions  Physical Activity: Limited    Secondary Prevention:  Statin? Yes - atorvastatin 40 mg daily  ACE-I/ARB? No  Aspirin? No    Pertinent PMH Review:  PMH of Pancreatitis: No  PMH of Retinopathy: No  PMH of Urinary Tract Infections: No  PMH of MTC: No    Drug Interactions  No relevant drug interactions were noted.    Objective   Allergies   Allergen Reactions    Norco [Hydrocodone-Acetaminophen] Nausea/vomiting     Social History     Social History Narrative    Not on file      Medication Review  Current Outpatient Medications   Medication Instructions    amLODIPine  "(NORVASC) 2.5 mg, oral, Daily    atorvastatin (LIPITOR) 40 mg, oral, Daily    BD Ultra-Fine Micro Pen Needle 32 gauge x 1/4\" needle USE TWICE DAILY    chlorhexidine (Peridex) 0.12 % solution 15 mL, Mouth/Throat, Daily, 15 mls swish and spit the night before surgery and 15mls swish and spit the morning of surgery do not swallow    Combivent Respimat  mcg/actuation inhaler 1 puff, inhalation, 4 times daily RT    fluticasone-umeclidin-vilanter (Trelegy Ellipta) 100-62.5-25 mcg blister with device 1 puff, inhalation, Daily    ibuprofen 800 mg, oral, Every 8 hours PRN    NovoLOG Mix 70-30FlexPen U-100 100 unit/mL (70-30) pen ADMINISTER 40 UNITS UNDER THE SKIN TWICE DAILY    OneTouch Delica Plus Lancet 33 gauge misc USE DAILY    OneTouch Ultra Test strip TEST BID AND PRN    Ozempic 0.5 mg, subcutaneous, Once Weekly      Vitals  BP Readings from Last 2 Encounters:   03/26/25 133/72   03/18/25 126/66     BMI Readings from Last 1 Encounters:   03/26/25 35.55 kg/m²      Labs  A1C  Lab Results   Component Value Date    HGBA1C 6.0 (H) 03/26/2025    HGBA1C 7.5 (H) 11/27/2024    HGBA1C 9.3 (H) 08/27/2024     BMP  Lab Results   Component Value Date    CALCIUM 9.2 03/26/2025     03/26/2025    K 3.6 03/26/2025    CO2 26 03/26/2025     (H) 03/26/2025    BUN 11 03/26/2025    CREATININE 0.61 03/26/2025    EGFR >90 03/26/2025     LFTs  Lab Results   Component Value Date    ALT 25 03/26/2025    AST 18 03/26/2025    ALKPHOS 83 03/26/2025    BILITOT 0.7 03/26/2025     FLP  Lab Results   Component Value Date    TRIG 206 (H) 03/26/2025    CHOL 144 03/26/2025    LDLF 62 01/11/2022    LDLCALC 52 03/26/2025    HDL 51.1 03/26/2025     Urine Microalbumin  Lab Results   Component Value Date    MICROALBCREA 11.3 08/27/2024     Weight Management  Wt Readings from Last 3 Encounters:   03/26/25 96.9 kg (213 lb 10 oz)   03/18/25 96.2 kg (212 lb)   10/07/24 99.9 kg (220 lb 3.8 oz)      There is no height or weight on file to calculate " BMI.     Assessment/Plan   Problem List Items Addressed This Visit       Essential hypertension    Mixed hyperlipidemia    Morbid obesity (Multi)    Type 2 diabetes mellitus with hyperglycemia, with long-term current use of insulin     Patient's goal A1c is < 7%.  Is pt at goal? Yes, 6% on 3.26.25  Patient's SMBGs are at goal.  Rationale for plan: Will plan to continue current therapy today as blood glucose readings at goal. Patient is to hold Ozempic prior to knee replacement surgery, then restart after surgery. Will continue to monitor and plan to increase Ozempic/decrease insulin in the future.    Medication Changes:  Continue:  Humalog Mix 75-25 - 38 units subcutaneously BID  HOLD:  Ozempic 0.5 mg subcutaneously once weekly until knee replacement, then re-start after surgery    Future Considerations:  Titrate GLP1  Start SGLT2i  Consider ACEi for renoprotective properties  Decrease insulin dosage    Monitoring and Education:  Ozempic Education:   Counseled patient on Ozempic MOA, expectations, side effects, duration of therapy, administration, and monitoring parameters.  Provided detailed dosing and administration counseling to ensure proper technique.   Reviewed Ozempic titration schedule, starting with 0.25 mg once weekly for 4 weeks, then continuing on 0.5 mg once weekly. Pt verbalized understanding.  Counseled patient on the benefits of GLP-1ra, such as cardiovascular risk reduction, glycemic control, and weight loss potential.  Reviewed storage requirements of Ozempic when not in use, and when to administer the medication if a dose is missed.  Advised patient that they may experience improved satiety after meals and portion sizes of meals may be reduced as doses of Ozempic increase.  Counseled patient to avoid foods that are fatty/oily as this may precipitate the nausea/GI upset that may occur with new start Ozempic.          Chronic obstructive pulmonary disease (Multi)     Other Visit Diagnoses       Type  2 diabetes mellitus without complication, with long-term current use of insulin        Encounter for Medicare annual wellness exam        Need for vaccination              Clinical Pharmacist follow-up: 4/14/25 @ 1:20 pm, Telehealth visit    Continue all meds under the continuation of care with the referring provider and clinical pharmacy team.    Thank you,  Luz Sharp, PharmD  Clinical Pharmacist  [phone]    Verbal consent to manage patient's drug therapy was obtained from the patient. They were informed they may decline to participate or withdraw from participation in pharmacy services at any time.

## 2025-04-02 ENCOUNTER — APPOINTMENT (OUTPATIENT)
Dept: CARDIOLOGY | Facility: CLINIC | Age: 65
End: 2025-04-02
Payer: COMMERCIAL

## 2025-04-02 VITALS
BODY MASS INDEX: 35.79 KG/M2 | HEIGHT: 65 IN | HEART RATE: 82 BPM | SYSTOLIC BLOOD PRESSURE: 132 MMHG | WEIGHT: 214.8 LBS | DIASTOLIC BLOOD PRESSURE: 80 MMHG

## 2025-04-02 DIAGNOSIS — Z01.818 PRE-OPERATIVE CLEARANCE: ICD-10-CM

## 2025-04-02 DIAGNOSIS — Z79.4 TYPE 2 DIABETES MELLITUS WITH HYPERGLYCEMIA, WITH LONG-TERM CURRENT USE OF INSULIN: ICD-10-CM

## 2025-04-02 DIAGNOSIS — E11.65 TYPE 2 DIABETES MELLITUS WITH HYPERGLYCEMIA, WITH LONG-TERM CURRENT USE OF INSULIN: ICD-10-CM

## 2025-04-02 DIAGNOSIS — R94.31 ABNORMAL EKG: ICD-10-CM

## 2025-04-02 DIAGNOSIS — I44.0 FIRST DEGREE AV BLOCK: ICD-10-CM

## 2025-04-02 DIAGNOSIS — E78.2 MIXED HYPERLIPIDEMIA: ICD-10-CM

## 2025-04-02 DIAGNOSIS — Z01.818 PRE-OP EXAMINATION: ICD-10-CM

## 2025-04-02 DIAGNOSIS — I10 ESSENTIAL HYPERTENSION: ICD-10-CM

## 2025-04-02 DIAGNOSIS — F17.200 CURRENT SMOKER: ICD-10-CM

## 2025-04-02 PROCEDURE — 3044F HG A1C LEVEL LT 7.0%: CPT | Performed by: INTERNAL MEDICINE

## 2025-04-02 PROCEDURE — 3078F DIAST BP <80 MM HG: CPT | Performed by: INTERNAL MEDICINE

## 2025-04-02 PROCEDURE — 1160F RVW MEDS BY RX/DR IN RCRD: CPT | Performed by: INTERNAL MEDICINE

## 2025-04-02 PROCEDURE — 99213 OFFICE O/P EST LOW 20 MIN: CPT | Performed by: INTERNAL MEDICINE

## 2025-04-02 PROCEDURE — 3048F LDL-C <100 MG/DL: CPT | Performed by: INTERNAL MEDICINE

## 2025-04-02 PROCEDURE — 3008F BODY MASS INDEX DOCD: CPT | Performed by: INTERNAL MEDICINE

## 2025-04-02 PROCEDURE — 3075F SYST BP GE 130 - 139MM HG: CPT | Performed by: INTERNAL MEDICINE

## 2025-04-02 PROCEDURE — 1159F MED LIST DOCD IN RCRD: CPT | Performed by: INTERNAL MEDICINE

## 2025-04-02 PROCEDURE — 4004F PT TOBACCO SCREEN RCVD TLK: CPT | Performed by: INTERNAL MEDICINE

## 2025-04-02 ASSESSMENT — ENCOUNTER SYMPTOMS
EYES NEGATIVE: 1
HEMATURIA: 0
CHOKING: 0
EYE DISCHARGE: 0
JOINT SWELLING: 0
STRIDOR: 0
FEVER: 0
HEMATOLOGIC/LYMPHATIC NEGATIVE: 1
DIAPHORESIS: 0
ALLERGIC/IMMUNOLOGIC NEGATIVE: 1
ABDOMINAL PAIN: 0
ENDOCRINE NEGATIVE: 1
VOMITING: 0
CHILLS: 0
SPEECH DIFFICULTY: 0
TROUBLE SWALLOWING: 0

## 2025-04-02 NOTE — PATIENT INSTRUCTIONS
Continue same medications and treatments.     Please bring any lab results from other providers / physicians to your next appointment.     Please bring all medicines, vitamins, and herbal supplements with you when you come to the office.     Prescriptions will not be filled unless you are compliant with your follow up appointments or have a follow up appointment scheduled as per instruction of your physician. Refills should be requested at the time of your visit.      DID YOU KNOW:  We have a pharmacy here in the [facility].  They can fill all prescriptions, not just cardiac medications.  Prescriptions from other pharmacies can easily be transferred to the  pharmacy by the  pharmacist on site.   pharmacies offer FREE HOME DELIVERY on medications to anywhere in [place]. They can sync your medications. Typically prescriptions can be ready in 10 - 15 minutes. If pharmacy is unable to fill your  prescription or if cost is more than your paying now the Pharmacist can easily transfer back to your Pharmacy of choice. Pharmacy phone # [phone].

## 2025-04-02 NOTE — PROGRESS NOTES
Chief Complaint:    Chief Complaint   Patient presents with    Follow-up     I AM SEEING DR. BARRETO FOR CARDIAC CLEARANCE    POC for left knee replacement surgery, Dr. Cornelius    Subjective :     Review of Systems   Constitutional:  Negative for chills, diaphoresis and fever.   HENT: Negative.  Negative for drooling, ear discharge, mouth sores and trouble swallowing.    Eyes: Negative.  Negative for discharge.   Respiratory:  Negative for choking and stridor.    Cardiovascular:         Interval review of systems is negative for chest discomfort pressure tightness heaviness palpitations lightheadedness orthopnea paroxysmal nocturnal dyspnea dependent edema or claudication TIA or CVA type symptoms or bleeding diathesis   Gastrointestinal:  Negative for abdominal pain and vomiting.   Endocrine: Negative.  Negative for cold intolerance and heat intolerance.   Genitourinary: Negative.  Negative for hematuria.   Musculoskeletal:  Negative for joint swelling.   Skin:  Negative for pallor.   Allergic/Immunologic: Negative.  Negative for immunocompromised state.   Neurological:  Negative for syncope and speech difficulty.   Hematological: Negative.    Psychiatric/Behavioral:  Negative for suicidal ideas.       History so Far :    1.  Mixed hyperlipidemia  2.  Essential hypertension  3.  Pericardial cyst, congenital  4.  Echocardiogram-diastolic dysfunction normal LVEF no mention of cyst.  Mild concentric left ventricular hypertrophy trace to 1+ mitral regurgitation diastolic dysfunction mild tricuspid regurgitation RV systolic pressure 31 mmHg no pleural or pericardial effusion  5.  Type 2 diabetes, insulin requiring.  6.  Lexiscan Myoview May 2023-normal perfusion LVEF 68% transient ischemic dilatation ratio 0.9      Objective   Wt Readings from Last 3 Encounters:   04/02/25 97.4 kg (214 lb 12.8 oz)   03/26/25 96.9 kg (213 lb 10 oz)   03/18/25 96.2 kg (212 lb)        Vitals:    04/02/25 1431 04/02/25 1434   BP: 130/76  "132/80   BP Location: Left arm Left arm   Patient Position: Sitting Standing   Pulse: 75 82   Weight: 97.4 kg (214 lb 12.8 oz)    Height: 1.651 m (5' 5\")            Physical Exam:    GENERAL APPEARANCE: in no acute distress.  CHEST: Symmetric and non-tender.  INTEGUMENT: Skin warm and dry  HEENT: No gross abnormalities identified.No pallor or scleral icterus.  NECK: Supple, no JVD, no bruit.   NEURO/PSHCY: Alert and oriented x3; appropriate behavior and responses and responses  LUNGS: Clear to auscultation bilaterally; normal respiratory effort.  HEART: Rate and rhythm regular with no evident murmur; no gallop appreciated.   ABDOMEN: Soft, non tender.  MUSCULOSKELETAL: Pain and limitation of movement of the left knee joint  EXTREMITIES: Warm  There is no edema noted.    Meds:  Current Outpatient Medications   Medication Instructions    amLODIPine (NORVASC) 2.5 mg, oral, Daily    atorvastatin (LIPITOR) 40 mg, oral, Daily    BD Ultra-Fine Micro Pen Needle 32 gauge x 1/4\" needle USE TWICE DAILY    chlorhexidine (Peridex) 0.12 % solution 15 mL, Mouth/Throat, Daily, 15 mls swish and spit the night before surgery and 15mls swish and spit the morning of surgery do not swallow    Combivent Respimat  mcg/actuation inhaler 1 puff, inhalation, 4 times daily RT    fluticasone-umeclidin-vilanter (Trelegy Ellipta) 100-62.5-25 mcg blister with device 1 puff, inhalation, Daily    ibuprofen 800 mg, oral, Every 8 hours PRN    NovoLOG Mix 70-30FlexPen U-100 100 unit/mL (70-30) pen ADMINISTER 40 UNITS UNDER THE SKIN TWICE DAILY    OneTouch Delica Plus Lancet 33 gauge misc USE DAILY    OneTouch Ultra Test strip TEST BID AND PRN    Ozempic 0.5 mg, subcutaneous, Once Weekly          Allergies   Allergen Reactions    Norco [Hydrocodone-Acetaminophen] Nausea/vomiting         LABS:      Testing Reviewed  Labs  CBC:   Lab Results   Component Value Date    WBC 6.9 03/26/2025    RBC 4.91 03/26/2025    HGB 14.3 03/26/2025    HCT 43.5 " 03/26/2025     03/26/2025        CMP:    Lab Results   Component Value Date     03/26/2025    K 3.6 03/26/2025     (H) 03/26/2025    CO2 26 03/26/2025    BUN 11 03/26/2025    CREATININE 0.61 03/26/2025    GLUCOSE 178 (H) 03/26/2025    CALCIUM 9.2 03/26/2025       Lipid Profile:    Lab Results   Component Value Date    TRIG 206 (H) 03/26/2025    HDL 51.1 03/26/2025    LDLCALC 52 03/26/2025       HgBA1c:    Lab Results   Component Value Date    HGBA1C 6.0 (H) 03/26/2025       Magnesium:    Lab Results   Component Value Date    MG 1.91 03/26/2025       FREE T4:    Lab Results   Component Value Date    FREET4 0.88 08/27/2024       TSH:    Lab Results   Component Value Date    TSH 1.01 03/26/2025         Reviewed all available pertinent laboratory data and diagnostic testing results that occurred after the last office visit with me                Assessment:    1. Pre-op examination  Referral to Cardiology      2. Type 2 diabetes mellitus with hyperglycemia, with long-term current use of insulin  Referral to Cardiology      3. Abnormal EKG  Referral to Cardiology      4. Diastolic dysfunction  Referral to Cardiology      EKG March 18, 2025-normal sinus rhythm left atrial abnormality poor R wave progression QTc about 470 ms no significant change compared to EKG of 10/26/2023    Clinical Decision Making:   Quality of life is very restricted because of knee pain.  No evidence of congestive heart failure or clinical valvular heart disease  Has first-degree AV block and poor R wave progression, unchanged compared to prior EKG.  Lexiscan Myoview from 2023 shows no evidence of ischemia.  LV systolic function is normal  Lipid profile-LDL 52  Good diabetes control hemoglobin A1c 6.0  Losing weight on Ozempic  Half pack per day smoker-encouraged nicotine abstinence  I would avoid AV jorge blocking agents or sinus node inhibitors given her first-degree AV block  Risk-benefit favors proceeding with surgery without  additional cardiac testing  Dynamic nature of coronary artery disease and the importance of seeking medical attention for symptoms was reiterated.  She will follow me on an as-needed basis        Follow up : FOLLOW UP PRN  Thank you Dr. Lewis for allowing me to participate in Cortney's care, please do not hesitate to call if further questions arise,  Sincerely,    Jolanta Saldivar MD FACC    I,Jake CRUZ am scribing for, and in the presence of Dr. Jolanta Saldivar MD, FACC.     I, Dr. Jolanta Saldivar MD, FACC, personally performed the services described in the documentation as scribed by Jake CRUZ in my presence, and confirm it is both accurate and complete.

## 2025-04-02 NOTE — ASSESSMENT & PLAN NOTE
Patient's goal A1c is < 7%.  Is pt at goal? Yes, 6% on 3.26.25  Patient's SMBGs are at goal.  Rationale for plan: Will plan to continue current therapy today as blood glucose readings at goal. Patient is to hold Ozempic prior to knee replacement surgery, then restart after surgery. Will continue to monitor and plan to increase Ozempic/decrease insulin in the future.    Medication Changes:  Continue:  Humalog Mix 75-25 - 38 units subcutaneously BID  HOLD:  Ozempic 0.5 mg subcutaneously once weekly until knee replacement, then re-start after surgery    Future Considerations:  Titrate GLP1  Start SGLT2i  Consider ACEi for renoprotective properties  Decrease insulin dosage    Monitoring and Education:  Ozempic Education:   Counseled patient on Ozempic MOA, expectations, side effects, duration of therapy, administration, and monitoring parameters.  Provided detailed dosing and administration counseling to ensure proper technique.   Reviewed Ozempic titration schedule, starting with 0.25 mg once weekly for 4 weeks, then continuing on 0.5 mg once weekly. Pt verbalized understanding.  Counseled patient on the benefits of GLP-1ra, such as cardiovascular risk reduction, glycemic control, and weight loss potential.  Reviewed storage requirements of Ozempic when not in use, and when to administer the medication if a dose is missed.  Advised patient that they may experience improved satiety after meals and portion sizes of meals may be reduced as doses of Ozempic increase.  Counseled patient to avoid foods that are fatty/oily as this may precipitate the nausea/GI upset that may occur with new start Ozempic.

## 2025-04-07 PROCEDURE — RXMED WILLOW AMBULATORY MEDICATION CHARGE

## 2025-04-08 ENCOUNTER — ANESTHESIA EVENT (OUTPATIENT)
Dept: OPERATING ROOM | Facility: HOSPITAL | Age: 65
End: 2025-04-08
Payer: COMMERCIAL

## 2025-04-08 SDOH — HEALTH STABILITY: MENTAL HEALTH: CURRENT SMOKER: 1

## 2025-04-08 NOTE — ANESTHESIA PREPROCEDURE EVALUATION
Cortney Ruby is a 64 y.o. female here for:    ARTHROPLASTY, KNEE, TOTAL STANDARD  With Azael Cornelius MD  Pre-Op Diagnosis Codes:      * Unilateral primary osteoarthritis, right knee [M17.11]    Relevant Problems   Cardiac  Nuclear stress 2023:  IMPRESSION:  Normal Lexiscan Myoview cardiac perfusion stress test.  No evidence of ischemia or myocardial infarction by perfusion imaging.  Normal left ventricular systolic function, ejection fraction 68%.  Abnormal resting electrocardiogram is noted.  No comparison study was available.     (+) Abnormal EKG   (+) Essential hypertension   (+) First degree AV block   (+) Mixed hyperlipidemia      Pulmonary   (+) Chronic obstructive pulmonary disease (Multi)      Neuro   (+) Carpal tunnel syndrome   (+) Cervical radiculopathy   (+) Cubital tunnel syndrome on left   (+) Current moderate episode of major depressive disorder, unspecified whether recurrent (Multi)      Liver   (+) Gall stones      Endocrine   (+) Morbid obesity (Multi)   (+) Type 2 diabetes mellitus with hyperglycemia, with long-term current use of insulin   (+) Type 2 diabetes mellitus with other specified complication, with long-term current use of insulin      Musculoskeletal   (+) Carpal tunnel syndrome   (+) Right knee DJD   (+) Unilateral primary osteoarthritis, right knee       Lab Results   Component Value Date    HGB 14.3 03/26/2025    HCT 43.5 03/26/2025    WBC 6.9 03/26/2025     03/26/2025     03/26/2025    K 3.6 03/26/2025     (H) 03/26/2025    CREATININE 0.61 03/26/2025    BUN 11 03/26/2025       Social History     Tobacco Use   Smoking Status Every Day    Current packs/day: 0.50    Average packs/day: 0.5 packs/day for 20.0 years (10.0 ttl pk-yrs)    Types: Cigarettes   Smokeless Tobacco Never       Allergies   Allergen Reactions    Norco [Hydrocodone-Acetaminophen] Nausea/vomiting       Current Outpatient Medications   Medication Instructions    amLODIPine (NORVASC) 2.5 mg, oral,  "Daily    atorvastatin (LIPITOR) 40 mg, oral, Daily    BD Ultra-Fine Micro Pen Needle 32 gauge x /\" needle USE TWICE DAILY    Combivent Respimat  mcg/actuation inhaler 1 puff, inhalation, 4 times daily RT    fluticasone-umeclidin-vilanter (Trelegy Ellipta) 100-62.5-25 mcg blister with device 1 puff, inhalation, Daily    ibuprofen 800 mg, oral, Every 8 hours PRN    naloxone (Narcan) 4 mg/0.1 mL nasal spray Instill 1 spray intranasally for opioid overdose; repeat in 5 minutes if no response.    NovoLOG Mix 70-30FlexPen U-100 100 unit/mL (70-30) pen ADMINISTER 40 UNITS UNDER THE SKIN TWICE DAILY    OneTouch Delica Plus Lancet 33 gauge misc USE DAILY    OneTouch Ultra Test strip TEST BID AND PRN    Ozempic 0.5 mg, subcutaneous, Once Weekly       Past Surgical History:   Procedure Laterality Date    CARPAL TUNNEL RELEASE Left     CERVICAL FUSION       SECTION, CLASSIC  1977    ,      SECTION, LOW TRANSVERSE      COLONOSCOPY  2018    adenoma-due     CYST REMOVAL      Left Cheek & Right [place]    CYSTOSCOPY      Dr. Sanchez-unsure date    LUMBAR DISCECTOMY      Dr. Clay    SPINE SURGERY      US ASPIRATION INJECTION INTERMEDIATE JOINT  2021    US ASPIRATION INJECTION INTERMEDIATE JOINT 2021 [facility]    XR CHEST PACEMAKER WITH FLUORO  2023    XR CHEST PACEMAKER WITH FLUORO 2023 [facility]       Family History   Problem Relation Name Age of Onset    Diabetes Mother      Lung cancer Mother      Diabetes Sister      Kidney cancer Sister         NPO Details:  No data recorded    Physical Exam    Airway  Mallampati: II  TM distance: >3 FB  Neck ROM: full     Cardiovascular - normal exam     Dental - normal exam     Pulmonary - normal exam     Abdominal            Anesthesia Plan    History of general anesthesia?: yes  History of complications of general anesthesia?: no    ASA 3     MAC, regional and spinal     The patient is a " current smoker.    intravenous induction   Postoperative administration of opioids is intended.  Anesthetic plan and risks discussed with patient.    Plan discussed with CRNA.

## 2025-04-09 ENCOUNTER — ANESTHESIA (OUTPATIENT)
Dept: OPERATING ROOM | Facility: HOSPITAL | Age: 65
End: 2025-04-09
Payer: COMMERCIAL

## 2025-04-09 ENCOUNTER — HOSPITAL ENCOUNTER (OUTPATIENT)
Facility: HOSPITAL | Age: 65
Discharge: HOME | End: 2025-04-10
Attending: ORTHOPAEDIC SURGERY | Admitting: ORTHOPAEDIC SURGERY
Payer: COMMERCIAL

## 2025-04-09 ENCOUNTER — HOME HEALTH ADMISSION (OUTPATIENT)
Dept: HOME HEALTH SERVICES | Facility: HOME HEALTH | Age: 65
End: 2025-04-09
Payer: COMMERCIAL

## 2025-04-09 DIAGNOSIS — M17.11 PRIMARY OSTEOARTHRITIS OF RIGHT KNEE: ICD-10-CM

## 2025-04-09 DIAGNOSIS — Z00.00 ENCOUNTER FOR MEDICARE ANNUAL WELLNESS EXAM: ICD-10-CM

## 2025-04-09 DIAGNOSIS — Z79.4 TYPE 2 DIABETES MELLITUS WITH HYPERGLYCEMIA, WITH LONG-TERM CURRENT USE OF INSULIN: ICD-10-CM

## 2025-04-09 DIAGNOSIS — E78.2 MIXED HYPERLIPIDEMIA: ICD-10-CM

## 2025-04-09 DIAGNOSIS — J44.9 CHRONIC OBSTRUCTIVE PULMONARY DISEASE, UNSPECIFIED COPD TYPE (MULTI): ICD-10-CM

## 2025-04-09 DIAGNOSIS — M17.11 UNILATERAL PRIMARY OSTEOARTHRITIS, RIGHT KNEE: Primary | ICD-10-CM

## 2025-04-09 DIAGNOSIS — Z23 NEED FOR VACCINATION: ICD-10-CM

## 2025-04-09 DIAGNOSIS — E66.01 MORBID OBESITY (MULTI): ICD-10-CM

## 2025-04-09 DIAGNOSIS — E11.65 TYPE 2 DIABETES MELLITUS WITH HYPERGLYCEMIA, WITH LONG-TERM CURRENT USE OF INSULIN: ICD-10-CM

## 2025-04-09 DIAGNOSIS — I10 ESSENTIAL HYPERTENSION: ICD-10-CM

## 2025-04-09 LAB
GLUCOSE BLD MANUAL STRIP-MCNC: 125 MG/DL (ref 74–99)
GLUCOSE BLD MANUAL STRIP-MCNC: 161 MG/DL (ref 74–99)
GLUCOSE BLD MANUAL STRIP-MCNC: 163 MG/DL (ref 74–99)
GLUCOSE BLD MANUAL STRIP-MCNC: 196 MG/DL (ref 74–99)
GLUCOSE BLD MANUAL STRIP-MCNC: 243 MG/DL (ref 74–99)

## 2025-04-09 PROCEDURE — 2500000004 HC RX 250 GENERAL PHARMACY W/ HCPCS (ALT 636 FOR OP/ED): Performed by: PHYSICIAN ASSISTANT

## 2025-04-09 PROCEDURE — 2720000007 HC OR 272 NO HCPCS: Performed by: ORTHOPAEDIC SURGERY

## 2025-04-09 PROCEDURE — 97161 PT EVAL LOW COMPLEX 20 MIN: CPT | Mod: GP

## 2025-04-09 PROCEDURE — 2500000004 HC RX 250 GENERAL PHARMACY W/ HCPCS (ALT 636 FOR OP/ED): Performed by: ORTHOPAEDIC SURGERY

## 2025-04-09 PROCEDURE — 3600000005 HC OR TIME - INITIAL BASE CHARGE - PROCEDURE LEVEL FIVE: Performed by: ORTHOPAEDIC SURGERY

## 2025-04-09 PROCEDURE — 2500000001 HC RX 250 WO HCPCS SELF ADMINISTERED DRUGS (ALT 637 FOR MEDICARE OP): Performed by: PHYSICIAN ASSISTANT

## 2025-04-09 PROCEDURE — 97110 THERAPEUTIC EXERCISES: CPT | Mod: GP

## 2025-04-09 PROCEDURE — 2500000004 HC RX 250 GENERAL PHARMACY W/ HCPCS (ALT 636 FOR OP/ED): Mod: JZ | Performed by: NURSE ANESTHETIST, CERTIFIED REGISTERED

## 2025-04-09 PROCEDURE — 2500000005 HC RX 250 GENERAL PHARMACY W/O HCPCS: Performed by: PHYSICIAN ASSISTANT

## 2025-04-09 PROCEDURE — 2500000002 HC RX 250 W HCPCS SELF ADMINISTERED DRUGS (ALT 637 FOR MEDICARE OP, ALT 636 FOR OP/ED): Performed by: STUDENT IN AN ORGANIZED HEALTH CARE EDUCATION/TRAINING PROGRAM

## 2025-04-09 PROCEDURE — 7100000011 HC EXTENDED STAY RECOVERY HOURLY - NURSING UNIT

## 2025-04-09 PROCEDURE — 3700000002 HC GENERAL ANESTHESIA TIME - EACH INCREMENTAL 1 MINUTE: Performed by: ORTHOPAEDIC SURGERY

## 2025-04-09 PROCEDURE — 2500000002 HC RX 250 W HCPCS SELF ADMINISTERED DRUGS (ALT 637 FOR MEDICARE OP, ALT 636 FOR OP/ED): Performed by: PHYSICIAN ASSISTANT

## 2025-04-09 PROCEDURE — 2500000005 HC RX 250 GENERAL PHARMACY W/O HCPCS: Performed by: ORTHOPAEDIC SURGERY

## 2025-04-09 PROCEDURE — 2500000004 HC RX 250 GENERAL PHARMACY W/ HCPCS (ALT 636 FOR OP/ED): Performed by: STUDENT IN AN ORGANIZED HEALTH CARE EDUCATION/TRAINING PROGRAM

## 2025-04-09 PROCEDURE — 2500000001 HC RX 250 WO HCPCS SELF ADMINISTERED DRUGS (ALT 637 FOR MEDICARE OP): Performed by: STUDENT IN AN ORGANIZED HEALTH CARE EDUCATION/TRAINING PROGRAM

## 2025-04-09 PROCEDURE — 2500000002 HC RX 250 W HCPCS SELF ADMINISTERED DRUGS (ALT 637 FOR MEDICARE OP, ALT 636 FOR OP/ED): Performed by: NURSE PRACTITIONER

## 2025-04-09 PROCEDURE — 3600000010 HC OR TIME - EACH INCREMENTAL 1 MINUTE - PROCEDURE LEVEL FIVE: Performed by: ORTHOPAEDIC SURGERY

## 2025-04-09 PROCEDURE — 3700000001 HC GENERAL ANESTHESIA TIME - INITIAL BASE CHARGE: Performed by: ORTHOPAEDIC SURGERY

## 2025-04-09 PROCEDURE — 7100000001 HC RECOVERY ROOM TIME - INITIAL BASE CHARGE: Performed by: ORTHOPAEDIC SURGERY

## 2025-04-09 PROCEDURE — 2500000001 HC RX 250 WO HCPCS SELF ADMINISTERED DRUGS (ALT 637 FOR MEDICARE OP): Performed by: NURSE PRACTITIONER

## 2025-04-09 PROCEDURE — C1776 JOINT DEVICE (IMPLANTABLE): HCPCS | Performed by: ORTHOPAEDIC SURGERY

## 2025-04-09 PROCEDURE — 27447 TOTAL KNEE ARTHROPLASTY: CPT | Performed by: PHYSICIAN ASSISTANT

## 2025-04-09 PROCEDURE — 2500000002 HC RX 250 W HCPCS SELF ADMINISTERED DRUGS (ALT 637 FOR MEDICARE OP, ALT 636 FOR OP/ED): Performed by: ORTHOPAEDIC SURGERY

## 2025-04-09 PROCEDURE — 27447 TOTAL KNEE ARTHROPLASTY: CPT | Performed by: ORTHOPAEDIC SURGERY

## 2025-04-09 PROCEDURE — 2500000001 HC RX 250 WO HCPCS SELF ADMINISTERED DRUGS (ALT 637 FOR MEDICARE OP): Performed by: ORTHOPAEDIC SURGERY

## 2025-04-09 PROCEDURE — 7100000002 HC RECOVERY ROOM TIME - EACH INCREMENTAL 1 MINUTE: Performed by: ORTHOPAEDIC SURGERY

## 2025-04-09 PROCEDURE — 2780000003 HC OR 278 NO HCPCS: Performed by: ORTHOPAEDIC SURGERY

## 2025-04-09 PROCEDURE — 82947 ASSAY GLUCOSE BLOOD QUANT: CPT | Mod: 59

## 2025-04-09 DEVICE — PATELLA
Type: IMPLANTABLE DEVICE | Site: KNEE | Status: FUNCTIONAL
Brand: TRIATHLON

## 2025-04-09 DEVICE — TIBIAL BEARING INSERT - CS
Type: IMPLANTABLE DEVICE | Site: KNEE | Status: FUNCTIONAL
Brand: TRIATHLON

## 2025-04-09 DEVICE — TIBIAL COMPONENT
Type: IMPLANTABLE DEVICE | Site: KNEE | Status: FUNCTIONAL
Brand: TRIATHLON

## 2025-04-09 DEVICE — CRUCIATE RETAINING FEMORAL
Type: IMPLANTABLE DEVICE | Site: KNEE | Status: FUNCTIONAL
Brand: TRIATHLON

## 2025-04-09 RX ORDER — PROCHLORPERAZINE MALEATE 5 MG
10 TABLET ORAL EVERY 6 HOURS PRN
Status: DISCONTINUED | OUTPATIENT
Start: 2025-04-09 | End: 2025-04-10 | Stop reason: HOSPADM

## 2025-04-09 RX ORDER — MIDAZOLAM HYDROCHLORIDE 1 MG/ML
INJECTION, SOLUTION INTRAMUSCULAR; INTRAVENOUS AS NEEDED
Status: DISCONTINUED | OUTPATIENT
Start: 2025-04-09 | End: 2025-04-09

## 2025-04-09 RX ORDER — OXYCODONE HYDROCHLORIDE 5 MG/1
10 TABLET ORAL EVERY 4 HOURS PRN
Status: DISCONTINUED | OUTPATIENT
Start: 2025-04-09 | End: 2025-04-09

## 2025-04-09 RX ORDER — FLUTICASONE FUROATE AND VILANTEROL 100; 25 UG/1; UG/1
1 POWDER RESPIRATORY (INHALATION)
Status: DISCONTINUED | OUTPATIENT
Start: 2025-04-09 | End: 2025-04-10 | Stop reason: HOSPADM

## 2025-04-09 RX ORDER — BISACODYL 5 MG
10 TABLET, DELAYED RELEASE (ENTERIC COATED) ORAL DAILY PRN
Status: DISCONTINUED | OUTPATIENT
Start: 2025-04-09 | End: 2025-04-10 | Stop reason: HOSPADM

## 2025-04-09 RX ORDER — ONDANSETRON 4 MG/1
4 TABLET, ORALLY DISINTEGRATING ORAL EVERY 8 HOURS PRN
Status: DISCONTINUED | OUTPATIENT
Start: 2025-04-09 | End: 2025-04-10 | Stop reason: HOSPADM

## 2025-04-09 RX ORDER — ONDANSETRON HYDROCHLORIDE 2 MG/ML
4 INJECTION, SOLUTION INTRAVENOUS ONCE AS NEEDED
Status: DISCONTINUED | OUTPATIENT
Start: 2025-04-09 | End: 2025-04-09 | Stop reason: HOSPADM

## 2025-04-09 RX ORDER — ATORVASTATIN CALCIUM 20 MG/1
40 TABLET, FILM COATED ORAL DAILY
Status: DISCONTINUED | OUTPATIENT
Start: 2025-04-09 | End: 2025-04-10 | Stop reason: HOSPADM

## 2025-04-09 RX ORDER — MEPERIDINE HYDROCHLORIDE 25 MG/ML
12.5 INJECTION INTRAMUSCULAR; INTRAVENOUS; SUBCUTANEOUS EVERY 10 MIN PRN
Status: DISCONTINUED | OUTPATIENT
Start: 2025-04-09 | End: 2025-04-09 | Stop reason: HOSPADM

## 2025-04-09 RX ORDER — GLYCOPYRROLATE 0.2 MG/ML
INJECTION INTRAMUSCULAR; INTRAVENOUS AS NEEDED
Status: DISCONTINUED | OUTPATIENT
Start: 2025-04-09 | End: 2025-04-09

## 2025-04-09 RX ORDER — NALOXONE HYDROCHLORIDE 1 MG/ML
0.2 INJECTION INTRAMUSCULAR; INTRAVENOUS; SUBCUTANEOUS EVERY 5 MIN PRN
Status: DISCONTINUED | OUTPATIENT
Start: 2025-04-09 | End: 2025-04-10 | Stop reason: HOSPADM

## 2025-04-09 RX ORDER — WATER 1 ML/ML
INJECTION IRRIGATION AS NEEDED
Status: DISCONTINUED | OUTPATIENT
Start: 2025-04-09 | End: 2025-04-09 | Stop reason: HOSPADM

## 2025-04-09 RX ORDER — SODIUM CHLORIDE, SODIUM LACTATE, POTASSIUM CHLORIDE, CALCIUM CHLORIDE 600; 310; 30; 20 MG/100ML; MG/100ML; MG/100ML; MG/100ML
50 INJECTION, SOLUTION INTRAVENOUS CONTINUOUS
Status: ACTIVE | OUTPATIENT
Start: 2025-04-09 | End: 2025-04-10

## 2025-04-09 RX ORDER — CEFAZOLIN SODIUM 2 G/100ML
2 INJECTION, SOLUTION INTRAVENOUS ONCE
Status: COMPLETED | OUTPATIENT
Start: 2025-04-09 | End: 2025-04-09

## 2025-04-09 RX ORDER — PROCHLORPERAZINE 25 MG/1
25 SUPPOSITORY RECTAL EVERY 12 HOURS PRN
Status: DISCONTINUED | OUTPATIENT
Start: 2025-04-09 | End: 2025-04-10 | Stop reason: HOSPADM

## 2025-04-09 RX ORDER — CELECOXIB 200 MG/1
200 CAPSULE ORAL ONCE
Status: COMPLETED | OUTPATIENT
Start: 2025-04-09 | End: 2025-04-09

## 2025-04-09 RX ORDER — CYCLOBENZAPRINE HCL 10 MG
10 TABLET ORAL 3 TIMES DAILY PRN
Status: DISCONTINUED | OUTPATIENT
Start: 2025-04-09 | End: 2025-04-10 | Stop reason: HOSPADM

## 2025-04-09 RX ORDER — DEXTROSE 50 % IN WATER (D50W) INTRAVENOUS SYRINGE
12.5
Status: DISCONTINUED | OUTPATIENT
Start: 2025-04-09 | End: 2025-04-10 | Stop reason: HOSPADM

## 2025-04-09 RX ORDER — TRANEXAMIC ACID 650 MG/1
1950 TABLET ORAL ONCE
Status: COMPLETED | OUTPATIENT
Start: 2025-04-10 | End: 2025-04-10

## 2025-04-09 RX ORDER — ONDANSETRON HYDROCHLORIDE 2 MG/ML
4 INJECTION, SOLUTION INTRAVENOUS EVERY 8 HOURS PRN
Status: DISCONTINUED | OUTPATIENT
Start: 2025-04-09 | End: 2025-04-10 | Stop reason: HOSPADM

## 2025-04-09 RX ORDER — FENTANYL CITRATE 50 UG/ML
25 INJECTION, SOLUTION INTRAMUSCULAR; INTRAVENOUS EVERY 5 MIN PRN
Status: DISCONTINUED | OUTPATIENT
Start: 2025-04-09 | End: 2025-04-09 | Stop reason: HOSPADM

## 2025-04-09 RX ORDER — SODIUM CHLORIDE, SODIUM LACTATE, POTASSIUM CHLORIDE, CALCIUM CHLORIDE 600; 310; 30; 20 MG/100ML; MG/100ML; MG/100ML; MG/100ML
100 INJECTION, SOLUTION INTRAVENOUS CONTINUOUS
Status: DISCONTINUED | OUTPATIENT
Start: 2025-04-09 | End: 2025-04-09 | Stop reason: HOSPADM

## 2025-04-09 RX ORDER — ROPIVACAINE HYDROCHLORIDE 2 MG/ML
20 INJECTION, SOLUTION EPIDURAL; INFILTRATION; PERINEURAL ONCE
Status: COMPLETED | OUTPATIENT
Start: 2025-04-09 | End: 2025-04-09

## 2025-04-09 RX ORDER — TRANEXAMIC ACID 650 MG/1
1950 TABLET ORAL ONCE
Status: COMPLETED | OUTPATIENT
Start: 2025-04-09 | End: 2025-04-09

## 2025-04-09 RX ORDER — CEFAZOLIN SODIUM 2 G/100ML
2 INJECTION, SOLUTION INTRAVENOUS EVERY 8 HOURS
Status: COMPLETED | OUTPATIENT
Start: 2025-04-09 | End: 2025-04-10

## 2025-04-09 RX ORDER — HYDRALAZINE HYDROCHLORIDE 20 MG/ML
5 INJECTION INTRAMUSCULAR; INTRAVENOUS EVERY 30 MIN PRN
Status: DISCONTINUED | OUTPATIENT
Start: 2025-04-09 | End: 2025-04-09 | Stop reason: HOSPADM

## 2025-04-09 RX ORDER — DIPHENHYDRAMINE HYDROCHLORIDE 50 MG/ML
INJECTION, SOLUTION INTRAMUSCULAR; INTRAVENOUS AS NEEDED
Status: DISCONTINUED | OUTPATIENT
Start: 2025-04-09 | End: 2025-04-09

## 2025-04-09 RX ORDER — DIPHENHYDRAMINE HYDROCHLORIDE 50 MG/ML
12.5 INJECTION, SOLUTION INTRAMUSCULAR; INTRAVENOUS ONCE AS NEEDED
Status: DISCONTINUED | OUTPATIENT
Start: 2025-04-09 | End: 2025-04-09 | Stop reason: HOSPADM

## 2025-04-09 RX ORDER — DEXTROSE 50 % IN WATER (D50W) INTRAVENOUS SYRINGE
25
Status: DISCONTINUED | OUTPATIENT
Start: 2025-04-09 | End: 2025-04-10 | Stop reason: HOSPADM

## 2025-04-09 RX ORDER — DOCUSATE SODIUM 100 MG/1
100 CAPSULE, LIQUID FILLED ORAL 2 TIMES DAILY
Status: DISCONTINUED | OUTPATIENT
Start: 2025-04-09 | End: 2025-04-10 | Stop reason: HOSPADM

## 2025-04-09 RX ORDER — FAMOTIDINE 10 MG/ML
INJECTION INTRAVENOUS AS NEEDED
Status: DISCONTINUED | OUTPATIENT
Start: 2025-04-09 | End: 2025-04-09

## 2025-04-09 RX ORDER — BUPIVACAINE HYDROCHLORIDE 7.5 MG/ML
INJECTION INTRAVENOUS AS NEEDED
Status: DISCONTINUED | OUTPATIENT
Start: 2025-04-09 | End: 2025-04-09

## 2025-04-09 RX ORDER — OXYCODONE HYDROCHLORIDE 5 MG/1
5 TABLET ORAL EVERY 4 HOURS PRN
Status: DISCONTINUED | OUTPATIENT
Start: 2025-04-09 | End: 2025-04-10 | Stop reason: HOSPADM

## 2025-04-09 RX ORDER — OXYCODONE HYDROCHLORIDE 5 MG/1
5 TABLET ORAL EVERY 4 HOURS PRN
Status: DISCONTINUED | OUTPATIENT
Start: 2025-04-09 | End: 2025-04-09

## 2025-04-09 RX ORDER — ALBUTEROL SULFATE 0.83 MG/ML
2.5 SOLUTION RESPIRATORY (INHALATION) ONCE AS NEEDED
Status: COMPLETED | OUTPATIENT
Start: 2025-04-09 | End: 2025-04-09

## 2025-04-09 RX ORDER — ASPIRIN 81 MG/1
81 TABLET ORAL 2 TIMES DAILY
Status: DISCONTINUED | OUTPATIENT
Start: 2025-04-09 | End: 2025-04-10 | Stop reason: HOSPADM

## 2025-04-09 RX ORDER — ONDANSETRON HYDROCHLORIDE 2 MG/ML
INJECTION, SOLUTION INTRAVENOUS AS NEEDED
Status: DISCONTINUED | OUTPATIENT
Start: 2025-04-09 | End: 2025-04-09

## 2025-04-09 RX ORDER — OXYCODONE HYDROCHLORIDE 5 MG/1
10 TABLET ORAL EVERY 6 HOURS PRN
Status: DISCONTINUED | OUTPATIENT
Start: 2025-04-09 | End: 2025-04-10 | Stop reason: HOSPADM

## 2025-04-09 RX ORDER — LIDOCAINE HYDROCHLORIDE 20 MG/ML
INJECTION, SOLUTION INFILTRATION; PERINEURAL AS NEEDED
Status: DISCONTINUED | OUTPATIENT
Start: 2025-04-09 | End: 2025-04-09

## 2025-04-09 RX ORDER — PROCHLORPERAZINE EDISYLATE 5 MG/ML
10 INJECTION INTRAMUSCULAR; INTRAVENOUS EVERY 6 HOURS PRN
Status: DISCONTINUED | OUTPATIENT
Start: 2025-04-09 | End: 2025-04-10 | Stop reason: HOSPADM

## 2025-04-09 RX ORDER — PROPOFOL 10 MG/ML
INJECTION, EMULSION INTRAVENOUS CONTINUOUS PRN
Status: DISCONTINUED | OUTPATIENT
Start: 2025-04-09 | End: 2025-04-09

## 2025-04-09 RX ORDER — ROPIVACAINE/EPI/CLONIDINE/KET 2.46-0.005
SYRINGE (ML) INJECTION AS NEEDED
Status: DISCONTINUED | OUTPATIENT
Start: 2025-04-09 | End: 2025-04-09 | Stop reason: HOSPADM

## 2025-04-09 RX ORDER — ACETAMINOPHEN 325 MG/1
650 TABLET ORAL ONCE
Status: COMPLETED | OUTPATIENT
Start: 2025-04-09 | End: 2025-04-09

## 2025-04-09 RX ORDER — LABETALOL HYDROCHLORIDE 5 MG/ML
5 INJECTION, SOLUTION INTRAVENOUS ONCE AS NEEDED
Status: DISCONTINUED | OUTPATIENT
Start: 2025-04-09 | End: 2025-04-09 | Stop reason: HOSPADM

## 2025-04-09 RX ORDER — SODIUM CHLORIDE 0.9 G/100ML
INJECTION, SOLUTION IRRIGATION AS NEEDED
Status: DISCONTINUED | OUTPATIENT
Start: 2025-04-09 | End: 2025-04-09 | Stop reason: HOSPADM

## 2025-04-09 RX ORDER — SEMAGLUTIDE 0.68 MG/ML
0.5 INJECTION, SOLUTION SUBCUTANEOUS
Qty: 3 ML | Refills: 1 | OUTPATIENT
Start: 2025-04-13 | End: 2025-06-08

## 2025-04-09 RX ORDER — ACETAMINOPHEN 325 MG/1
650 TABLET ORAL EVERY 6 HOURS SCHEDULED
Status: DISCONTINUED | OUTPATIENT
Start: 2025-04-09 | End: 2025-04-10 | Stop reason: HOSPADM

## 2025-04-09 RX ORDER — INSULIN LISPRO 100 [IU]/ML
0-5 INJECTION, SOLUTION INTRAVENOUS; SUBCUTANEOUS
Status: DISCONTINUED | OUTPATIENT
Start: 2025-04-09 | End: 2025-04-10 | Stop reason: HOSPADM

## 2025-04-09 RX ORDER — AMLODIPINE BESYLATE 5 MG/1
2.5 TABLET ORAL DAILY
Status: DISCONTINUED | OUTPATIENT
Start: 2025-04-09 | End: 2025-04-10 | Stop reason: HOSPADM

## 2025-04-09 RX ORDER — KETOROLAC TROMETHAMINE 30 MG/ML
15 INJECTION, SOLUTION INTRAMUSCULAR; INTRAVENOUS EVERY 6 HOURS
Status: COMPLETED | OUTPATIENT
Start: 2025-04-09 | End: 2025-04-10

## 2025-04-09 RX ADMIN — ROPIVACAINE HYDROCHLORIDE 40 MG: 2 INJECTION, SOLUTION EPIDURAL; INFILTRATION at 08:19

## 2025-04-09 RX ADMIN — GLYCOPYRROLATE 0.2 MG: 0.2 INJECTION, SOLUTION INTRAMUSCULAR; INTRAVENOUS at 08:53

## 2025-04-09 RX ADMIN — ONDANSETRON 4 MG: 2 INJECTION, SOLUTION INTRAMUSCULAR; INTRAVENOUS at 08:50

## 2025-04-09 RX ADMIN — ATORVASTATIN CALCIUM 40 MG: 20 TABLET, FILM COATED ORAL at 20:51

## 2025-04-09 RX ADMIN — INSULIN LISPRO 1 UNITS: 100 INJECTION, SOLUTION INTRAVENOUS; SUBCUTANEOUS at 16:41

## 2025-04-09 RX ADMIN — TRANEXAMIC ACID 1950 MG: 650 TABLET ORAL at 06:45

## 2025-04-09 RX ADMIN — SODIUM CHLORIDE, SODIUM LACTATE, POTASSIUM CHLORIDE, AND CALCIUM CHLORIDE 50 ML/HR: .6; .31; .03; .02 INJECTION, SOLUTION INTRAVENOUS at 06:39

## 2025-04-09 RX ADMIN — ACETAMINOPHEN 650 MG: 325 TABLET ORAL at 12:11

## 2025-04-09 RX ADMIN — ACETAMINOPHEN 650 MG: 325 TABLET ORAL at 06:45

## 2025-04-09 RX ADMIN — DEXAMETHASONE SODIUM PHOSPHATE 4 MG: 4 INJECTION, SOLUTION INTRAMUSCULAR; INTRAVENOUS at 08:49

## 2025-04-09 RX ADMIN — MIDAZOLAM 2 MG: 1 INJECTION INTRAMUSCULAR; INTRAVENOUS at 08:00

## 2025-04-09 RX ADMIN — ASPIRIN 81 MG: 81 TABLET, COATED ORAL at 20:51

## 2025-04-09 RX ADMIN — DIPHENHYDRAMINE HYDROCHLORIDE 25 MG: 50 INJECTION, SOLUTION INTRAMUSCULAR; INTRAVENOUS at 08:49

## 2025-04-09 RX ADMIN — SODIUM CHLORIDE, POTASSIUM CHLORIDE, SODIUM LACTATE AND CALCIUM CHLORIDE 50 ML/HR: 600; 310; 30; 20 INJECTION, SOLUTION INTRAVENOUS at 12:11

## 2025-04-09 RX ADMIN — TRANEXAMIC ACID 1950 MG: 650 TABLET ORAL at 16:41

## 2025-04-09 RX ADMIN — DOCUSATE SODIUM 100 MG: 100 CAPSULE, LIQUID FILLED ORAL at 12:11

## 2025-04-09 RX ADMIN — AMLODIPINE BESYLATE 2.5 MG: 5 TABLET ORAL at 12:11

## 2025-04-09 RX ADMIN — KETOROLAC TROMETHAMINE 15 MG: 30 INJECTION, SOLUTION INTRAMUSCULAR at 12:11

## 2025-04-09 RX ADMIN — POVIDONE-IODINE 1 APPLICATION: 5 SOLUTION TOPICAL at 06:55

## 2025-04-09 RX ADMIN — CEFAZOLIN SODIUM 2 G: 2 INJECTION, SOLUTION INTRAVENOUS at 16:42

## 2025-04-09 RX ADMIN — DEXAMETHASONE SODIUM PHOSPHATE: 10 INJECTION, SOLUTION INTRAMUSCULAR; INTRAVENOUS at 08:18

## 2025-04-09 RX ADMIN — LIDOCAINE HYDROCHLORIDE 80 MG: 20 INJECTION, SOLUTION INFILTRATION; PERINEURAL at 08:24

## 2025-04-09 RX ADMIN — CEFAZOLIN SODIUM 2 G: 2 INJECTION, SOLUTION INTRAVENOUS at 08:45

## 2025-04-09 RX ADMIN — FAMOTIDINE 20 MG: 10 INJECTION, SOLUTION INTRAVENOUS at 08:52

## 2025-04-09 RX ADMIN — ACETAMINOPHEN 650 MG: 325 TABLET ORAL at 18:12

## 2025-04-09 RX ADMIN — ALBUTEROL SULFATE 2.5 MG: 2.5 SOLUTION RESPIRATORY (INHALATION) at 10:10

## 2025-04-09 RX ADMIN — BUPIVACAINE HYDROCHLORIDE IN DEXTROSE 1.8 ML: 7.5 INJECTION, SOLUTION SUBARACHNOID at 08:22

## 2025-04-09 RX ADMIN — CELECOXIB 200 MG: 200 CAPSULE ORAL at 06:45

## 2025-04-09 RX ADMIN — OXYCODONE 10 MG: 5 TABLET ORAL at 10:44

## 2025-04-09 RX ADMIN — INSULIN LISPRO 1 UNITS: 100 INJECTION, SOLUTION INTRAVENOUS; SUBCUTANEOUS at 12:12

## 2025-04-09 RX ADMIN — DOCUSATE SODIUM 100 MG: 100 CAPSULE, LIQUID FILLED ORAL at 20:51

## 2025-04-09 RX ADMIN — PROPOFOL 50 MCG/KG/MIN: 10 INJECTION, EMULSION INTRAVENOUS at 08:24

## 2025-04-09 RX ADMIN — KETOROLAC TROMETHAMINE 15 MG: 30 INJECTION, SOLUTION INTRAMUSCULAR at 18:12

## 2025-04-09 SDOH — ECONOMIC STABILITY: FOOD INSECURITY: WITHIN THE PAST 12 MONTHS, THE FOOD YOU BOUGHT JUST DIDN'T LAST AND YOU DIDN'T HAVE MONEY TO GET MORE.: NEVER TRUE

## 2025-04-09 SDOH — SOCIAL STABILITY: SOCIAL INSECURITY
WITHIN THE LAST YEAR, HAVE YOU BEEN KICKED, HIT, SLAPPED, OR OTHERWISE PHYSICALLY HURT BY YOUR PARTNER OR EX-PARTNER?: NO

## 2025-04-09 SDOH — ECONOMIC STABILITY: FOOD INSECURITY: WITHIN THE PAST 12 MONTHS, YOU WORRIED THAT YOUR FOOD WOULD RUN OUT BEFORE YOU GOT THE MONEY TO BUY MORE.: NEVER TRUE

## 2025-04-09 SDOH — HEALTH STABILITY: PHYSICAL HEALTH
HOW OFTEN DO YOU NEED TO HAVE SOMEONE HELP YOU WHEN YOU READ INSTRUCTIONS, PAMPHLETS, OR OTHER WRITTEN MATERIAL FROM YOUR DOCTOR OR PHARMACY?: NEVER

## 2025-04-09 SDOH — ECONOMIC STABILITY: TRANSPORTATION INSECURITY: IN THE PAST 12 MONTHS, HAS LACK OF TRANSPORTATION KEPT YOU FROM MEDICAL APPOINTMENTS OR FROM GETTING MEDICATIONS?: NO

## 2025-04-09 SDOH — ECONOMIC STABILITY: FOOD INSECURITY: HOW HARD IS IT FOR YOU TO PAY FOR THE VERY BASICS LIKE FOOD, HOUSING, MEDICAL CARE, AND HEATING?: NOT VERY HARD

## 2025-04-09 SDOH — SOCIAL STABILITY: SOCIAL NETWORK: HOW OFTEN DO YOU ATTEND MEETINGS OF THE CLUBS OR ORGANIZATIONS YOU BELONG TO?: NEVER

## 2025-04-09 SDOH — ECONOMIC STABILITY: HOUSING INSECURITY: IN THE PAST 12 MONTHS, HOW MANY TIMES HAVE YOU MOVED WHERE YOU WERE LIVING?: 0

## 2025-04-09 SDOH — SOCIAL STABILITY: SOCIAL NETWORK
IN A TYPICAL WEEK, HOW MANY TIMES DO YOU TALK ON THE PHONE WITH FAMILY, FRIENDS, OR NEIGHBORS?: MORE THAN THREE TIMES A WEEK

## 2025-04-09 SDOH — SOCIAL STABILITY: SOCIAL INSECURITY: ARE THERE ANY APPARENT SIGNS OF INJURIES/BEHAVIORS THAT COULD BE RELATED TO ABUSE/NEGLECT?: NO

## 2025-04-09 SDOH — ECONOMIC STABILITY: HOUSING INSECURITY: IN THE LAST 12 MONTHS, WAS THERE A TIME WHEN YOU WERE NOT ABLE TO PAY THE MORTGAGE OR RENT ON TIME?: NO

## 2025-04-09 SDOH — SOCIAL STABILITY: SOCIAL INSECURITY: WITHIN THE LAST YEAR, HAVE YOU BEEN AFRAID OF YOUR PARTNER OR EX-PARTNER?: NO

## 2025-04-09 SDOH — HEALTH STABILITY: PHYSICAL HEALTH: ON AVERAGE, HOW MANY DAYS PER WEEK DO YOU ENGAGE IN MODERATE TO STRENUOUS EXERCISE (LIKE A BRISK WALK)?: 0 DAYS

## 2025-04-09 SDOH — ECONOMIC STABILITY: INCOME INSECURITY: IN THE PAST 12 MONTHS HAS THE ELECTRIC, GAS, OIL, OR WATER COMPANY THREATENED TO SHUT OFF SERVICES IN YOUR HOME?: NO

## 2025-04-09 SDOH — SOCIAL STABILITY: SOCIAL INSECURITY: WITHIN THE LAST YEAR, HAVE YOU BEEN HUMILIATED OR EMOTIONALLY ABUSED IN OTHER WAYS BY YOUR PARTNER OR EX-PARTNER?: NO

## 2025-04-09 SDOH — SOCIAL STABILITY: SOCIAL INSECURITY
WITHIN THE LAST YEAR, HAVE YOU BEEN RAPED OR FORCED TO HAVE ANY KIND OF SEXUAL ACTIVITY BY YOUR PARTNER OR EX-PARTNER?: NO

## 2025-04-09 SDOH — SOCIAL STABILITY: SOCIAL INSECURITY: DO YOU FEEL ANYONE HAS EXPLOITED OR TAKEN ADVANTAGE OF YOU FINANCIALLY OR OF YOUR PERSONAL PROPERTY?: NO

## 2025-04-09 SDOH — ECONOMIC STABILITY: HOUSING INSECURITY: DO YOU FEEL UNSAFE GOING BACK TO THE PLACE WHERE YOU LIVE?: NO

## 2025-04-09 SDOH — SOCIAL STABILITY: SOCIAL INSECURITY: ARE YOU MARRIED, WIDOWED, DIVORCED, SEPARATED, NEVER MARRIED, OR LIVING WITH A PARTNER?: DIVORCED

## 2025-04-09 SDOH — SOCIAL STABILITY: SOCIAL INSECURITY: ARE YOU OR HAVE YOU BEEN THREATENED OR ABUSED PHYSICALLY, EMOTIONALLY, OR SEXUALLY BY ANYONE?: NO

## 2025-04-09 SDOH — SOCIAL STABILITY: SOCIAL NETWORK
DO YOU BELONG TO ANY CLUBS OR ORGANIZATIONS SUCH AS CHURCH GROUPS, UNIONS, FRATERNAL OR ATHLETIC GROUPS, OR SCHOOL GROUPS?: NO

## 2025-04-09 SDOH — SOCIAL STABILITY: SOCIAL INSECURITY: DO YOU FEEL UNSAFE GOING BACK TO THE PLACE WHERE YOU ARE LIVING?: NO

## 2025-04-09 SDOH — ECONOMIC STABILITY: HOUSING INSECURITY: AT ANY TIME IN THE PAST 12 MONTHS, WERE YOU HOMELESS OR LIVING IN A SHELTER (INCLUDING NOW)?: NO

## 2025-04-09 SDOH — SOCIAL STABILITY: SOCIAL INSECURITY: HAS ANYONE EVER THREATENED TO HURT YOUR FAMILY OR YOUR PETS?: NO

## 2025-04-09 SDOH — HEALTH STABILITY: PHYSICAL HEALTH: ON AVERAGE, HOW MANY MINUTES DO YOU ENGAGE IN EXERCISE AT THIS LEVEL?: 0 MIN

## 2025-04-09 SDOH — SOCIAL STABILITY: SOCIAL INSECURITY: DOES ANYONE TRY TO KEEP YOU FROM HAVING/CONTACTING OTHER FRIENDS OR DOING THINGS OUTSIDE YOUR HOME?: NO

## 2025-04-09 SDOH — SOCIAL STABILITY: SOCIAL INSECURITY: WERE YOU ABLE TO COMPLETE ALL THE BEHAVIORAL HEALTH SCREENINGS?: YES

## 2025-04-09 SDOH — SOCIAL STABILITY: SOCIAL INSECURITY: HAVE YOU HAD THOUGHTS OF HARMING ANYONE ELSE?: NO

## 2025-04-09 SDOH — SOCIAL STABILITY: SOCIAL INSECURITY: ABUSE: ADULT

## 2025-04-09 SDOH — SOCIAL STABILITY: SOCIAL INSECURITY: HAVE YOU HAD ANY THOUGHTS OF HARMING ANYONE ELSE?: NO

## 2025-04-09 ASSESSMENT — COGNITIVE AND FUNCTIONAL STATUS - GENERAL
DAILY ACTIVITIY SCORE: 20
TURNING FROM BACK TO SIDE WHILE IN FLAT BAD: A LITTLE
MOBILITY SCORE: 18
STANDING UP FROM CHAIR USING ARMS: A LITTLE
DRESSING REGULAR UPPER BODY CLOTHING: A LITTLE
HELP NEEDED FOR BATHING: A LITTLE
STANDING UP FROM CHAIR USING ARMS: A LITTLE
WALKING IN HOSPITAL ROOM: A LITTLE
STANDING UP FROM CHAIR USING ARMS: A LITTLE
MOVING FROM LYING ON BACK TO SITTING ON SIDE OF FLAT BED WITH BEDRAILS: A LITTLE
MOBILITY SCORE: 18
WALKING IN HOSPITAL ROOM: A LITTLE
DAILY ACTIVITIY SCORE: 20
MOVING FROM LYING ON BACK TO SITTING ON SIDE OF FLAT BED WITH BEDRAILS: A LITTLE
DRESSING REGULAR LOWER BODY CLOTHING: A LITTLE
TOILETING: A LITTLE
HELP NEEDED FOR BATHING: A LITTLE
DRESSING REGULAR LOWER BODY CLOTHING: A LITTLE
MOVING FROM LYING ON BACK TO SITTING ON SIDE OF FLAT BED WITH BEDRAILS: A LITTLE
MOVING TO AND FROM BED TO CHAIR: A LITTLE
CLIMB 3 TO 5 STEPS WITH RAILING: A LOT
CLIMB 3 TO 5 STEPS WITH RAILING: A LITTLE
DRESSING REGULAR UPPER BODY CLOTHING: A LITTLE
TURNING FROM BACK TO SIDE WHILE IN FLAT BAD: A LITTLE
TOILETING: A LITTLE
MOVING TO AND FROM BED TO CHAIR: A LITTLE
MOBILITY SCORE: 17
TURNING FROM BACK TO SIDE WHILE IN FLAT BAD: A LITTLE
PATIENT BASELINE BEDBOUND: NO
CLIMB 3 TO 5 STEPS WITH RAILING: A LITTLE
MOVING TO AND FROM BED TO CHAIR: A LITTLE
WALKING IN HOSPITAL ROOM: A LITTLE

## 2025-04-09 ASSESSMENT — ACTIVITIES OF DAILY LIVING (ADL)
FEEDING YOURSELF: INDEPENDENT
HEARING - RIGHT EAR: FUNCTIONAL
ASSISTIVE_DEVICE: WALKER
LACK_OF_TRANSPORTATION: NO
TOILETING: NEEDS ASSISTANCE
BATHING: NEEDS ASSISTANCE
DRESSING YOURSELF: NEEDS ASSISTANCE
ADEQUATE_TO_COMPLETE_ADL: YES
HEARING - LEFT EAR: FUNCTIONAL
GROOMING: INDEPENDENT
LACK_OF_TRANSPORTATION: NO
PATIENT'S MEMORY ADEQUATE TO SAFELY COMPLETE DAILY ACTIVITIES?: YES
WALKS IN HOME: NEEDS ASSISTANCE
JUDGMENT_ADEQUATE_SAFELY_COMPLETE_DAILY_ACTIVITIES: YES

## 2025-04-09 ASSESSMENT — PATIENT HEALTH QUESTIONNAIRE - PHQ9
SUM OF ALL RESPONSES TO PHQ9 QUESTIONS 1 & 2: 2
1. LITTLE INTEREST OR PLEASURE IN DOING THINGS: SEVERAL DAYS
2. FEELING DOWN, DEPRESSED OR HOPELESS: SEVERAL DAYS

## 2025-04-09 ASSESSMENT — PAIN SCALES - GENERAL
PAIN_LEVEL: 0
PAINLEVEL_OUTOF10: 0 - NO PAIN
PAINLEVEL_OUTOF10: 9
PAINLEVEL_OUTOF10: 5 - MODERATE PAIN
PAINLEVEL_OUTOF10: 0 - NO PAIN
PAINLEVEL_OUTOF10: 7
PAINLEVEL_OUTOF10: 0 - NO PAIN
PAINLEVEL_OUTOF10: 0 - NO PAIN
PAINLEVEL_OUTOF10: 2
PAINLEVEL_OUTOF10: 0 - NO PAIN
PAINLEVEL_OUTOF10: 9

## 2025-04-09 ASSESSMENT — LIFESTYLE VARIABLES
SUBSTANCE_ABUSE_PAST_12_MONTHS: NO
HOW OFTEN DO YOU HAVE A DRINK CONTAINING ALCOHOL: NEVER
HOW MANY STANDARD DRINKS CONTAINING ALCOHOL DO YOU HAVE ON A TYPICAL DAY: PATIENT DOES NOT DRINK
AUDIT-C TOTAL SCORE: 0
AUDIT-C TOTAL SCORE: 0
PRESCIPTION_ABUSE_PAST_12_MONTHS: NO
HOW OFTEN DO YOU HAVE 6 OR MORE DRINKS ON ONE OCCASION: NEVER
SKIP TO QUESTIONS 9-10: 1

## 2025-04-09 ASSESSMENT — PAIN DESCRIPTION - DESCRIPTORS
DESCRIPTORS: SORE
DESCRIPTORS: ACHING

## 2025-04-09 ASSESSMENT — COLUMBIA-SUICIDE SEVERITY RATING SCALE - C-SSRS
1. IN THE PAST MONTH, HAVE YOU WISHED YOU WERE DEAD OR WISHED YOU COULD GO TO SLEEP AND NOT WAKE UP?: NO
6. HAVE YOU EVER DONE ANYTHING, STARTED TO DO ANYTHING, OR PREPARED TO DO ANYTHING TO END YOUR LIFE?: NO
2. HAVE YOU ACTUALLY HAD ANY THOUGHTS OF KILLING YOURSELF?: NO

## 2025-04-09 ASSESSMENT — PAIN - FUNCTIONAL ASSESSMENT
PAIN_FUNCTIONAL_ASSESSMENT: 0-10

## 2025-04-09 NOTE — ANESTHESIA PROCEDURE NOTES
Peripheral Block    Patient location during procedure: pre-op  Start time: 4/9/2025 8:00 AM  End time: 4/9/2025 8:10 AM  Reason for block: at surgeon's request and post-op pain management  Staffing  Performed: attending   Authorized by: Anatoliy Copeland DO    Performed by: Anatoliy oCpeland DO  Preanesthetic Checklist  Completed: patient identified, IV checked, site marked, risks and benefits discussed, surgical consent, monitors and equipment checked, pre-op evaluation and timeout performed   Timeout performed at: 4/9/2025 8:00 AM  Peripheral Block  Patient position: laying flat  Prep: ChloraPrep  Patient monitoring: heart rate, cardiac monitor and continuous pulse ox  Block type: adductor canal (+IPACK)  Laterality: right  Injection technique: single-shot  Guidance: ultrasound guided  Local infiltration: lidocaine  Infiltration strength: 1 %  Dose: 5 mL  Needle  Needle gauge: 21 G  Needle length: 10 cm  Needle localization: anatomical landmarks and ultrasound guidance  Assessment  Injection assessment: negative aspiration for heme, no paresthesia on injection, incremental injection and local visualized surrounding nerve on ultrasound  Paresthesia pain: none  Heart rate change: no  Slow fractionated injection: yes  Additional Notes  Adductor canal and IPACK nerve blocks performed for post-operative analgesia. 2 mg IV versed given for procedural sedation. Sterile prep. 20 mL of 0.5% ropivacaine with decadron 5 mg given in divided doses for the adductor canal. 20 mL of 0.2% ropivacaine given for the IPACK block. Negative aspiration for heme every 5 mL injected. No pain, paresthesias. Patient tolerated the procedure well without complications.

## 2025-04-09 NOTE — CARE PLAN
The patient's goals for the shift include      The clinical goals for the shift include pain control and start PT

## 2025-04-09 NOTE — DISCHARGE INSTRUCTIONS
Total Knee Replacement  Discharge Instructions    To prevent Clot formation, you have been placed on the following medication:  ASA 81 mg twice a day for 30 days started on 4/9/25.  Surgical Site Care:  .Change dressing once a day and PRN (as needed). Apply 4 x 4 sponge and light tape. If glue present, leave open to air.  You may leave wound open to air after initial dressing removal, if wound is clean, dry and intact  If Aquacel Ag dressing is present, do not remove dressing for 7 days, unless heavily saturated. If heavily saturated, remove dressing and start using instructions above  Staples will be removed on post-operative day 14 and steri-strips applied  Showering is permitted starting POD1 if waterproof Aquacel dressing is present or when the incision is covered with 4 x 4 and Tegaderm waterproof dressing  Until all areas of incision are healed.    Physical Therapy:  Weight Bearing Status:  WBAT  Precautions, Per Physical Therapy Handout  Pain Medications  You were given  oxycodone  Wean off pain medications as you deem appropriate as long as pain is under control  Cold packs/Ice packs/Machine  May be used 3 times daily for 15-30 minutes as necessary  Be sure to have a barrier (cloth, clothing, towel) between the site and the ice pack to prevent frostbite  Contact [facility] office if  Increased redness, swelling, drainage of any kind, and/or pain to surgery site.  As well as new onset fevers and or chills.  These could signify an infection.  Calf or thigh tenderness to touch as well as increased swelling or redness.  This could signify a clot formation.  Numbness or tingling to an area around the incision site or below the incision site (toes).  Any rash appears, increased  or new onset nausea/vomiting occur.  This may indicate a reaction to a medication.  Phone # [phone].  Follow up with Surgeon   I acknowledge that I have received marco antonio hose and understand the instructions on how and when  to wear them (on during the day, off at night)   Discharging RN who has gone over instructions and acknowledges dionne hose have been received   Ice 5 times a day for 20 minutes each session to operative extremity for two weeks.   DIONNE hose to be worn for three weeks. Can be removed for skin care and hygiene.   Incentive spirometer 10 times every hour while awake for two weeks.

## 2025-04-09 NOTE — INTERVAL H&P NOTE
Interval History and Physical     I have interviewed and examined the patient and reviewed the recent History and Physical.  There have been no changes to the recent H&P documentation.     The patient understands the planned operation and its associated risks and benefits and agrees to proceed.     The surgical consent form has been signed.    Visit Vitals  /73   Pulse 84   Temp 36.7 °C (98.1 °F) (Temporal)   Resp 16        Azael Cornelius MD

## 2025-04-09 NOTE — ANESTHESIA PROCEDURE NOTES
Spinal Block    Patient location during procedure: OR  Start time: 4/9/2025 8:20 AM  End time: 4/9/2025 8:22 AM  Reason for block: primary anesthetic  Staffing  Performed: JUVENTINO   Authorized by: Anatoliy Copeland DO    Performed by: DINO Krueger    Preanesthetic Checklist  Completed: patient identified, IV checked, site marked, risks and benefits discussed, surgical consent, monitors and equipment checked, pre-op evaluation, timeout performed and sterile techniques followed  Block Timeout  RN/Licensed healthcare professional reads aloud to the Anesthesia provider and entire team: Patient identity, procedure with side and site, patient position, and as applicable the availability of implants/special equipment/special requirements.    Timeout performed at: 4/9/2025 8:20 AM  Spinal Block  Patient position: sitting  Prep: Betadine  Sterility prep: cap, drape, gloves, hand hygiene and mask  Sedation level: light sedation  Patient monitoring: blood pressure and heart rate  Approach: midline  Vertebral space: L4-5  Injection technique: single-shot  Needle  Needle type: pencil-point   Needle gauge: 24 G  Needle length: 4 in  Free flowing CSF: yes    Assessment  Sensory level: T10 bilateral  Procedure assessment: patient sedated but conversant throughout procedure and patient tolerated procedure well with no immediate complications  Additional Notes  LOT [id number] exp 11/30/2026

## 2025-04-09 NOTE — PROGRESS NOTES
04/09/25 1244   Discharge Planning   Living Arrangements Family members  (grand-daughter and great grandson live with her)   Support Systems Children;Family members   Assistance Needed none, PTA independent ADLS and IADLS with cane, drives, denies falls. Pt owns FWW, cane, tub shower with grab bar and standard height toilet   Type of Residence Private residence  (1 level home + basement (laundry))   Number of Stairs to Enter Residence 0  (ramp entry)   Number of Stairs Within Residence 12   Do you have animals or pets at home? Yes   Type of Animals or Pets 1 Dog   Home or Post Acute Services In home services   Type of Home Care Services Home OT;Home PT   Expected Discharge Disposition Home H  ([facility])   Does the patient need discharge transport arranged? No   Financial Resource Strain   How hard is it for you to pay for the very basics like food, housing, medical care, and heating? Not hard   Housing Stability   In the last 12 months, was there a time when you were not able to pay the mortgage or rent on time? N   In the past 12 months, how many times have you moved where you were living? 0   At any time in the past 12 months, were you homeless or living in a shelter (including now)? N   Transportation Needs   In the past 12 months, has lack of transportation kept you from medical appointments or from getting medications? no   In the past 12 months, has lack of transportation kept you from meetings, work, or from getting things needed for daily living? No   Patient Choice   Provider Choice list and CMS website (https://medicare.gov/care-compare#search) for post-acute Quality and Resource Measure Data were provided and reviewed with: Patient   Patient / Family choosing to utilize agency / facility established prior to hospitalization No   Stroke Family Assessment   Stroke Family Assessment Needed No   Intensity of Service   Intensity of Service 0-30 min     Pt is s/p Elective Right total knee arthroplasty 4/9/25  with Dr. Azael Cornelius. Pt is weight bearing as tolerated to operative extremity with use of walker for assistance with ambulation. Pt will discharge on Aspirin 81 mg PO BID x 30 days for DVT prophylaxis. PT/OT eval [facility] scores are currently pending. Pt discharge preference is home with [facility] and agency of choice is [facility]. Pt grand-daughter home to assist and pt daughter took 2 days off work to assist also. CNP aware of pt [facility] agency of choice. Demographics verified. CT team will monitor case for progression and DC planning.

## 2025-04-09 NOTE — PROGRESS NOTES
Physical Therapy    Physical Therapy Evaluation & Treatment    Patient Name: Cortney Ruby  MRN: [id number]  Department: [facility]  Room: [address] [place], [place] [address]  Today's Date: 4/9/2025   Time Calculation  Start Time: 1336  Stop Time: 1401  Time Calculation (min): 25 min    Assessment/Plan   PT Assessment  PT Assessment Results: Decreased strength, Decreased range of motion, Decreased endurance, Decreased mobility, Orthopedic restrictions, Pain  Rehab Prognosis: Good  Barriers to Discharge Home: Physical needs  Evaluation/Treatment Tolerance: Patient limited by pain, Patient limited by fatigue  End of Session Communication: Bedside nurse  Assessment Comment: Pt would benefit from continued therapy to improve strength, ROM, and funtional mobility.  End of Session Patient Position: Up in chair, Alarm on (Call button within reach, ice pack donned on R knee, grandaughter in room)   IP OR SWING BED PT PLAN  Inpatient or Swing Bed: Inpatient  PT Plan  Treatment/Interventions: Bed mobility, Transfer training, Gait training, Strengthening, Therapeutic exercise, Home exercise program  PT Plan: Ongoing PT  PT Frequency: BID  PT Discharge Recommendations: Low intensity level of continued care  PT Recommended Transfer Status: Assist x1, Assistive device  PT - OK to Discharge: Once medically appropriate      Subjective     General Visit Information:  General  Reason for Referral: Recent surgery  Referred By: PT/OT referred by Ashli 4/9  Past Medical History Relevant to Rehab: COPD, HTN, Depression, DM, GERD, Vertigo  Family/Caregiver Present: Yes  Caregiver Feedback: Granddaughter present- supportive  Prior to Session Communication: Bedside nurse  Patient Position Received: Bed, 3 rail up, Alarm off, not on at start of session  General Comment: S/p elective R TKA 4/9 performed by Ashli  Home Living:  Home Living  Home Living Comments: Per pt, lives with granddaughter and great grandson in one level home + basement. Bed/bathroom on first floor, laundry  in basement. Ramp enterance. Has tub shower with grab bars. Owns WW and SPC.  Prior Level of Function:  Prior Function Per Pt/Caregiver Report  Prior Function Comments: Per pt, independent with all ADLs and IADLs. Mod I with WW during ambulation. No falls reported in the last 3 months, pt drives.  Precautions:  Precautions  LE Weight Bearing Status:  (R WBAT)  Medical Precautions: Fall precautions     Objective   Pain:  Pain Assessment  Pain Assessment: 0-10  0-10 (Numeric) Pain Score: 0 - No pain  Pain Type: Surgical pain  Pain Location: Knee  Pain Orientation: Right  Cognition:  Cognition  Orientation Level: Oriented X4    General Assessments:  General Observation  General Observation: Pt supine in bed prior to session with HOB elevated to 40 deg. Pt reports mild tinglingi R foot following surgery. Granddaughter is in room. Pt was educated on PT POC and consents to PT evaluation    Activity Tolerance  Endurance: Decreased tolerance for upright activites    Sensation  Light Touch:  (Pt reports residual tingling in R foot)    Static Sitting Balance  Static Sitting-Comment/Number of Minutes: CGA+ B UE/LE support  Dynamic Sitting Balance  Dynamic Sitting-Comments: CGA + B UE/LE support    Static Standing Balance  Static Standing-Comment/Number of Minutes: CGA + WW  Dynamic Standing Balance  Dynamic Standing-Comments: CGA + WW  Functional Assessments:  Bed Mobility  Bed Mobility:  (Supine > sit EOB: SBA + HOB elevated to 40 deg. Increased time and effort needed. Pt used B UE to advance trunk, then swing B LE off bed. Supervision for scooting hips toward EOB)    Transfers  Transfer:  (Sit <> stand: CGA + WW, Pt reports numbness in R foot, no signs of knee buckling. WBOS, cues to bring walker closer to body, decreased stance time on R)    Ambulation/Gait Training  Ambulation/Gait Training Performed:  (2ft, WW + CGA: WBOS, cues to bring walker closer to body, decreased stance time on R, no signs of knee  buckling)    Extremity/Trunk Assessments:  ROM   RUE :  Shoulder flexion/ER: WFL, Elbow/wrist/hand AROM: WFL  LUE:  Shoulder flexion/ER: WFL, Elbow/wrist/hand AROM: WFL  RLE :  Hip flexion/extension: WFL, Knee flexion: 75 (measured in supine), Knee extension lacks 5 deg (measured in supine), DF/PF: WFL  LLE :  Hip flexion/extension: WFL, knee flexion/extension: WFL, DF/PF: WFL    Strength  Strength Comments: B shoulder flexion: 5/5,  strength strong and equal B, B hip flexion 5/5, L knee extension: 5/5, B DF: 5/5    Treatments:  Therapeutic Exercise  Therapeutic Exercise Performed:  (B (AP, GS, QS, SAQ, HS) x 10 ea, B SLR x 5 ea)    Bed Mobility  Bed Mobility:  (Supine > sit EOB: SBA + HOB elevated to 40 deg. Increased time and effort needed. Pt used B UE to advance trunk, then swing B LE off bed. Supervision for scooting hips toward EOB)    Ambulation/Gait Training  Ambulation/Gait Training Performed:  (2ft, WW + CGA: WBOS, cues to bring walker closer to body, decreased stance time on R, no signs of knee buckling)  Transfers  Transfer:  (Sit <> stand: CGA + WW, Pt reports numbness in R foot, no signs of knee buckling. WBOS, cues to bring walker closer to body, decreased stance time on R)  Outcome Measures:  [facility] Basic Mobility  Turning from your back to your side while in a flat bed without using bedrails: A little  Moving from lying on your back to sitting on the side of a flat bed without using bedrails: A little  Moving to and from bed to chair (including a wheelchair): A little  Standing up from a chair using your arms (e.g. wheelchair or bedside chair): A little  To walk in hospital room: A little  Climbing 3-5 steps with railing: A lot  Basic Mobility - Total Score: 17    Encounter Problems       Encounter Problems (Active)       PT Problem       Pt will completed supine <> sit bed mobility from flat bed with [employer]  (Progressing)       Start:  04/09/25    Expected End:  04/23/25            Pt will  demonstrate sit to stand transfers with WW + Mod I  (Progressing)       Start:  04/09/25    Expected End:  04/23/25            Pt. will ambulate >100ft with WW + Mod I  (Progressing)       Start:  04/09/25    Expected End:  04/23/25            Pt will achieve 0-90 deg R knee AROM to aid in functional mobility (Progressing)       Start:  04/09/25    Expected End:  04/23/25            Pt will complete R TKA strengthening Hep with independence (Progressing)       Start:  04/09/25    Expected End:  04/23/25                       Education Documentation  Handouts, taught by Trinity Rivera PT at 4/9/2025  2:28 PM.  Learner: Patient  Readiness: Acceptance  Method: Explanation  Response: Verbalizes Understanding, Demonstrated Understanding    Precautions, taught by Trinity Rivera PT at 4/9/2025  2:28 PM.  Learner: Patient  Readiness: Acceptance  Method: Explanation  Response: Verbalizes Understanding, Demonstrated Understanding    Body Mechanics, taught by Trinity Rivera PT at 4/9/2025  2:28 PM.  Learner: Patient  Readiness: Acceptance  Method: Explanation  Response: Verbalizes Understanding, Demonstrated Understanding    Home Exercise Program, taught by Trinity Rivera PT at 4/9/2025  2:28 PM.  Learner: Patient  Readiness: Acceptance  Method: Explanation  Response: Verbalizes Understanding, Demonstrated Understanding    Mobility Training, taught by Trinity Rivera PT at 4/9/2025  2:28 PM.  Learner: Patient  Readiness: Acceptance  Method: Explanation  Response: Verbalizes Understanding, Demonstrated Understanding

## 2025-04-09 NOTE — PROGRESS NOTES
Patient seen upon arrival to floor from pacu, S/P RTKA.     Doing well, denies pain, still has some numbness to operative extremity. Able to dorsiflex and plantar flex lower extremities.     Plan of care and discharge discussed, plans to return to home with [facility], orders placed.     Home medications reviewed and resumed as appropriate this admission.     She may WBAT to operative extremity with use of walker for assistance with ambulation.   She will take ASA 81 mg BID for 30 days starting this evening for DVT prophylaxis.

## 2025-04-09 NOTE — OP NOTE
ARTHROPLASTY, KNEE, TOTAL STANDARD (R) Operative Note     Date: 2025  OR Location: [facility]    Name: Cortney Ruby : 1960, Age: 64 y.o., MRN: [id number], Sex: female    Diagnosis  Pre-op Diagnosis      * Unilateral primary osteoarthritis, right knee [M17.11] Post-op Diagnosis     * Unilateral primary osteoarthritis, right knee [M17.11]     Procedures  ARTHROPLASTY, KNEE, TOTAL STANDARD  41059 - [place] ARTHRP KNE CONDYLE&PLATU MEDIAL&LAT COMPARTMENTS      Surgeons      * Azael Cornelius - Primary    Resident/Fellow/Other Assistant:  Surgeons and Role:     * Clayton Ryan PA-C - Assisting    Staff:   Circulator: Luis  Scrub Person: Bell    Anesthesia Staff: Anesthesiologist: Anatoliy Copeland DO  CRNA: [facility]    Procedure Summary  Anesthesia: Regional, Monitor Anesthesia Care, Spinal  ASA: III  Estimated Blood Loss: 50 mL  Intra-op Medications:   Administrations occurring from 0745 to 1030 on 25:   Medication Name Total Dose   ropivacaine-epinephrine-clonidine-ketorolac 2.46-0.005- 0.0008-0.3mg/mL periarticular syringe 100 mL   sodium chloride 0.9 % irrigation solution 1,000 mL   sterile water irrigation solution 1,000 mL   bupivacaine PF 0.75 %-dextrose 8.25 % (Sensorcaine) intrathecal 1.8 mL   dexAMETHasone (Decadron) 4 mg/mL 4 mg   diphenhydrAMINE 50 mg/mL 25 mg   famotidine (Pepcid) 10 mg/mL 20 mg   glycopyrrolate 0.2 mg/mL 0.2 mg   lidocaine (Xylocaine) 2 % 80 mg   midazolam (Versed) 1 mg/1 mL 2 mg   ondansetron (Zofran) 2 mg/mL injection 4 mg   propofol (Diprivan) infusion 10 mg/mL 820.66 mg   ceFAZolin (Ancef) 2 g in dextrose (iso)  mL 2 g   dexAMETHasone (PF) (Decadron) 5 mg, ropivacaine (Naropin) 5 mg/mL (0.5 %) 100 mg injection Cannot be calculated   ropivacaine (Naropin) 0.2 % injection solution 40 mg 40 mg              Anesthesia Record               Intraprocedure I/O Totals          Intake    Propofol Drip 0.00 mL    The total shown is the total volume documented since  Anesthesia Start was filed.    Total Intake 0 mL          Specimen: No specimens collected              Drains and/or Catheters: * None in log *    Tourniquet Times:   * Missing tourniquet times found for documented tourniquets in lo *     Implants:  Implants       Type Name Action Serial No.       1/8IN TOBY HEADLESS PIN, FLUTED, STERILE, DISPOSABLE Used, Not Implanted      Joint COMPONENT, CR FEMORAL, P5, BEADED W/PA, RIGHT - [id number] Implanted      Joint BASEPLATE, TRIATHLON TRITANIUM, SIZE 4, 46MM - [id number] Implanted       10MM TRIATHLON CS TIBIAL INSERT - X3 Implanted      Joint PATELLA, TRIATHLON, ASYMMETRIC,  SIZE A32 - [id number] Implanted               Findings: Osteoarthrosis right knee    Indications: Cortney Ruby is an 64 y.o. female who is having surgery for Unilateral primary osteoarthritis, right knee [M17.11].     The patient was seen in the preoperative area. The risks, benefits, complications, treatment options, non-operative alternatives, expected recovery and outcomes were discussed with the patient. The possibilities of reaction to medication, pulmonary aspiration, injury to surrounding structures, bleeding, recurrent infection, the need for additional procedures, failure to diagnose a condition, and creating a complication requiring transfusion or operation were discussed with the patient. The patient concurred with the proposed plan, giving informed consent.  The site of surgery was properly noted/marked if necessary per policy. The patient has been actively warmed in preoperative area. Preoperative antibiotics have been ordered and given within 1 hours of incision. Venous thrombosis prophylaxis have been ordered including bilateral sequential compression devices and chemical prophylaxis    Procedure Details: Indications:    The patient is here for a right total knee arthroplasty.  They have failed conservative treatment.  Total knee arthroplasty was offered.  The procedure  was explained along with the risks, benefits and alternatives being reviewed.  Potential risks including but not limited to: Infection, neurovascular complication, loosening, wear, DVT, instability as well as the potential need for reoperation and/or revision surgery were all discussed with the patient and they consented to the procedure.      Components: Hugoton Sinanlololy    See above implant record    Operative procedure:    The patient was brought to the operative suite and spinal anesthetic was administered per the anesthesia team.  The patient was then placed in the supine position.  All bony prominences were well-padded.  A tourniquet was placed on the right thigh proximally over web roll.  A bump was placed under the hip on the operative side.  The lower extremity was then sterilely prepped with ChloraPrep then sterilely draped in usual manner.  A timeout was performed.  The patient was identified, the site and laterality confirmed as was the administration of antibiotics confirmed.  Tranexamic acid was given orally prior in the holding area.    I began by exsanguinating the right lower extremity and inflated the tourniquet to 250 mmHg.  I then identified landmarks.  I made an incision anteriorly over the knee for a standard approach for total knee arthroplasty.  I used careful dissection to the subcutaneous and fatty tissues.  Identified the quadriceps tendon, medial retinaculum and peritenon over the patellar tendon.  I entered the knee joint by incising through the quadriceps tendon, medial retinaculum and down to the bone of the proximal tibia medial to the patellar tendon.  I performed my medial release for sharply with a knife then cleaned completed this with an elevator.  I undermined the soft tissues behind the patellar tendon.  I then debrided the synovium from the suprapatellar pouch.  I then everted the patella and brought the knee up into flexion.  I debrided the meniscus partially as well as the  ACL.  My debrided any osteophytes along the medial femoral condyle lateral femoral condyle.  I then used a drill to gain access to the femoral medullary canal.  With the femoral guide set at 5 degrees of valgus I planned for 8 millimeters of distal femoral resection.  The PA pinned the cutting block into place.  The distal femoral cut was then made.    I then brought the tibia forward.  I debrided the remainder of the meniscus.  Using an extra medullary tibial cutting guide set for a 3 degree posterior slope I used a 2 mm stylus and measured off the most involved compartment which was medial.  The cutting block was pinned into place.  I then made my tibial cut.    I then checked my extension gap and was satisfied with this.  I then flexed the knee and using diffusion block to help establish my rotation obtained a measurement of 4-1/2 degrees.  I placed the femoral sizing guide set at the appropriate external rotation and drilled for the chamfer block.  The chamfer block was placed and I made my anterior, posterior and chamfer cuts.  I then debrided the posterior condyles with an osteotome and mallet as well as an elevator.  I checked my flexion and extension gaps.  I then placed the trial tibial insert and secured this into place.  I placed the femoral component.  I then placed a trial insert size 9 mm CS.  I trialed with different inserts and decided on a size 10 mm CS tibial insert.  I then drilled the lugs on the femur.    At that point I made my freehand patellar cut resecting approximately 1 cm of patella.  This was confirmed with a caliper.  I then decided on a size 32 mm patella.  The PA drilled for the patellar pegs.  I then placed a trial patellar button.  I took the knee through range of motion and had excellent patellar tracking.  I was satisfied with my stability varus and valgus stressing the at zero, 30 and 60 degrees.  I was satisfied with my stability in the AP plane at 90 degrees.  The trial  instrumentation was removed.  I then prepared the tibia in standard fashion.    At that point I removed the trial tibial tray.  I debrided my gutters, medially, laterally as well as posteriorly.  I then thoroughly copiously irrigated the knee with pulsatile lavage.  I infiltrated the soft tissues and periosteum with a mixture of ropivacaine with epinephrine, clonidine and Toradol.    The tourniquet was released.  Hemostasis was maintained with electrocautery.  I then impacted the components beginning with the tibia followed by the femur then clamped the patella into place.  I then snapped in the permanent size [] insert.  I again took the knee through range of motion was satisfied with my stability and patellar tracking.    Attention was turned to closure.  I thoroughly raj irrigated with pulsatile lavage as well as with irrisept.  The quadriceps tendon medial retinaculum and proximal tibia tissues reapproximated with Ethibond suture.  The PA then closed the fatty tissues with 0 Vicryl.  Subcutaneous closure was with 2-0 Vicryl.  The skin was closed with a Monocryl subcuticular stitch.  An Aquacel dressing was applied.    The physician assistant was present for the entire case.  Given the nature of the procedure and disease process a skilled surgical assistant was necessary for the case.  The assistant was necessary for retraction and helped directly facilitate completion of the surgery.  A certified scrub tech was at the back table managing instruments and supplies for the surgical procedure.    Complications:  None; patient tolerated the procedure well.    Disposition: PACU - hemodynamically stable.  Condition: stable                 Additional Details: Not applicable    Attending Attestation: I performed the procedure.    Azael Cornelius  Phone Number: [phone]

## 2025-04-09 NOTE — ANESTHESIA POSTPROCEDURE EVALUATION
Patient: Cortney Ruby    Procedure Summary       Date: 04/09/25 Room / Location: [facility] 12 / [facility]    Anesthesia Start: 0815 Anesthesia Stop:     Procedure: ARTHROPLASTY, KNEE, TOTAL STANDARD (Right: Knee) Diagnosis:       Unilateral primary osteoarthritis, right knee      (Unilateral primary osteoarthritis, right knee [M17.11])    Surgeons: Azael Cornelius MD Responsible Provider: Anatoliy Copeland DO    Anesthesia Type: MAC, regional, spinal ASA Status: 3            Anesthesia Type: MAC, regional, spinal    Vitals Value Taken Time   /63 04/09/25 1008   Temp 36 04/09/25 1011   Pulse 75 04/09/25 1011   Resp 22 04/09/25 1011   SpO2 100 % 04/09/25 1009   Vitals shown include unfiled device data.    Anesthesia Post Evaluation    Patient location during evaluation: PACU  Patient participation: complete - patient participated  Level of consciousness: awake  Pain score: 0  Pain management: adequate  Airway patency: patent  Cardiovascular status: acceptable, stable and hemodynamically stable  Respiratory status: acceptable and nasal cannula  Hydration status: acceptable  Postoperative Nausea and Vomiting: none        There were no known notable events for this encounter.

## 2025-04-10 ENCOUNTER — PHARMACY VISIT (OUTPATIENT)
Dept: PHARMACY | Facility: CLINIC | Age: 65
End: 2025-04-10
Payer: COMMERCIAL

## 2025-04-10 ENCOUNTER — DOCUMENTATION (OUTPATIENT)
Dept: HOME HEALTH SERVICES | Facility: HOME HEALTH | Age: 65
End: 2025-04-10
Payer: COMMERCIAL

## 2025-04-10 VITALS
HEART RATE: 72 BPM | OXYGEN SATURATION: 95 % | DIASTOLIC BLOOD PRESSURE: 63 MMHG | BODY MASS INDEX: 34.75 KG/M2 | RESPIRATION RATE: 18 BRPM | TEMPERATURE: 97.7 F | WEIGHT: 208.56 LBS | SYSTOLIC BLOOD PRESSURE: 120 MMHG | HEIGHT: 65 IN

## 2025-04-10 PROBLEM — M17.11 UNILATERAL PRIMARY OSTEOARTHRITIS, RIGHT KNEE: Status: RESOLVED | Noted: 2025-01-21 | Resolved: 2025-04-10

## 2025-04-10 LAB
ANION GAP SERPL CALC-SCNC: 11 MMOL/L (ref 10–20)
BUN SERPL-MCNC: 15 MG/DL (ref 6–23)
CALCIUM SERPL-MCNC: 8.6 MG/DL (ref 8.6–10.3)
CHLORIDE SERPL-SCNC: 107 MMOL/L (ref 98–107)
CO2 SERPL-SCNC: 23 MMOL/L (ref 21–32)
CREAT SERPL-MCNC: 0.54 MG/DL (ref 0.5–1.05)
EGFRCR SERPLBLD CKD-EPI 2021: >90 ML/MIN/1.73M*2
ERYTHROCYTE [DISTWIDTH] IN BLOOD BY AUTOMATED COUNT: 13.2 % (ref 11.5–14.5)
GLUCOSE BLD MANUAL STRIP-MCNC: 174 MG/DL (ref 74–99)
GLUCOSE BLD MANUAL STRIP-MCNC: 183 MG/DL (ref 74–99)
GLUCOSE SERPL-MCNC: 180 MG/DL (ref 74–99)
HCT VFR BLD AUTO: 33.9 % (ref 36–46)
HGB BLD-MCNC: 11.2 G/DL (ref 12–16)
MCH RBC QN AUTO: 29.5 PG (ref 26–34)
MCHC RBC AUTO-ENTMCNC: 33 G/DL (ref 32–36)
MCV RBC AUTO: 89 FL (ref 80–100)
NRBC BLD-RTO: 0 /100 WBCS (ref 0–0)
PLATELET # BLD AUTO: 260 X10*3/UL (ref 150–450)
POTASSIUM SERPL-SCNC: 4 MMOL/L (ref 3.5–5.3)
RBC # BLD AUTO: 3.8 X10*6/UL (ref 4–5.2)
SODIUM SERPL-SCNC: 137 MMOL/L (ref 136–145)
WBC # BLD AUTO: 12.7 X10*3/UL (ref 4.4–11.3)

## 2025-04-10 PROCEDURE — RXMED WILLOW AMBULATORY MEDICATION CHARGE

## 2025-04-10 PROCEDURE — 2500000001 HC RX 250 WO HCPCS SELF ADMINISTERED DRUGS (ALT 637 FOR MEDICARE OP): Performed by: NURSE PRACTITIONER

## 2025-04-10 PROCEDURE — 2500000001 HC RX 250 WO HCPCS SELF ADMINISTERED DRUGS (ALT 637 FOR MEDICARE OP): Performed by: ORTHOPAEDIC SURGERY

## 2025-04-10 PROCEDURE — 97535 SELF CARE MNGMENT TRAINING: CPT | Mod: GO

## 2025-04-10 PROCEDURE — 97116 GAIT TRAINING THERAPY: CPT | Mod: GP,CQ

## 2025-04-10 PROCEDURE — 97110 THERAPEUTIC EXERCISES: CPT | Mod: GP,CQ

## 2025-04-10 PROCEDURE — 2500000002 HC RX 250 W HCPCS SELF ADMINISTERED DRUGS (ALT 637 FOR MEDICARE OP, ALT 636 FOR OP/ED): Performed by: ORTHOPAEDIC SURGERY

## 2025-04-10 PROCEDURE — 2500000002 HC RX 250 W HCPCS SELF ADMINISTERED DRUGS (ALT 637 FOR MEDICARE OP, ALT 636 FOR OP/ED): Performed by: NURSE PRACTITIONER

## 2025-04-10 PROCEDURE — 85027 COMPLETE CBC AUTOMATED: CPT | Performed by: ORTHOPAEDIC SURGERY

## 2025-04-10 PROCEDURE — 2500000004 HC RX 250 GENERAL PHARMACY W/ HCPCS (ALT 636 FOR OP/ED): Performed by: ORTHOPAEDIC SURGERY

## 2025-04-10 PROCEDURE — 82947 ASSAY GLUCOSE BLOOD QUANT: CPT

## 2025-04-10 PROCEDURE — 7100000011 HC EXTENDED STAY RECOVERY HOURLY - NURSING UNIT

## 2025-04-10 PROCEDURE — 80048 BASIC METABOLIC PNL TOTAL CA: CPT | Performed by: ORTHOPAEDIC SURGERY

## 2025-04-10 PROCEDURE — 36415 COLL VENOUS BLD VENIPUNCTURE: CPT | Performed by: ORTHOPAEDIC SURGERY

## 2025-04-10 PROCEDURE — 97165 OT EVAL LOW COMPLEX 30 MIN: CPT | Mod: GO

## 2025-04-10 RX ORDER — ASPIRIN 81 MG/1
81 TABLET ORAL 2 TIMES DAILY
Qty: 60 TABLET | Refills: 0 | Status: SHIPPED | OUTPATIENT
Start: 2025-04-10 | End: 2025-05-10

## 2025-04-10 RX ORDER — CYCLOBENZAPRINE HCL 10 MG
10 TABLET ORAL 3 TIMES DAILY PRN
Qty: 21 TABLET | Refills: 0 | Status: SHIPPED | OUTPATIENT
Start: 2025-04-10 | End: 2025-04-17

## 2025-04-10 RX ORDER — DOCUSATE SODIUM 100 MG/1
100 CAPSULE, LIQUID FILLED ORAL 2 TIMES DAILY
Qty: 20 CAPSULE | Refills: 0 | Status: SHIPPED | OUTPATIENT
Start: 2025-04-10 | End: 2025-04-20

## 2025-04-10 RX ORDER — OXYCODONE HYDROCHLORIDE 5 MG/1
5 TABLET ORAL EVERY 6 HOURS PRN
Qty: 28 TABLET | Refills: 0 | Status: SHIPPED | OUTPATIENT
Start: 2025-04-10 | End: 2025-04-17

## 2025-04-10 RX ORDER — ACETAMINOPHEN 325 MG/1
650 TABLET ORAL EVERY 6 HOURS SCHEDULED
Qty: 56 TABLET | Refills: 0 | Status: SHIPPED | OUTPATIENT
Start: 2025-04-10 | End: 2025-04-17

## 2025-04-10 RX ADMIN — DOCUSATE SODIUM 100 MG: 100 CAPSULE, LIQUID FILLED ORAL at 08:08

## 2025-04-10 RX ADMIN — INSULIN LISPRO 1 UNITS: 100 INJECTION, SOLUTION INTRAVENOUS; SUBCUTANEOUS at 11:08

## 2025-04-10 RX ADMIN — OXYCODONE HYDROCHLORIDE 5 MG: 5 TABLET ORAL at 11:51

## 2025-04-10 RX ADMIN — KETOROLAC TROMETHAMINE 15 MG: 30 INJECTION, SOLUTION INTRAMUSCULAR at 06:17

## 2025-04-10 RX ADMIN — FLUTICASONE FUROATE AND VILANTEROL TRIFENATATE 1 PUFF: 100; 25 POWDER RESPIRATORY (INHALATION) at 06:18

## 2025-04-10 RX ADMIN — TIOTROPIUM BROMIDE INHALATION SPRAY 2 PUFF: 3.12 SPRAY, METERED RESPIRATORY (INHALATION) at 06:18

## 2025-04-10 RX ADMIN — ASPIRIN 81 MG: 81 TABLET, COATED ORAL at 08:07

## 2025-04-10 RX ADMIN — INSULIN LISPRO 1 UNITS: 100 INJECTION, SOLUTION INTRAVENOUS; SUBCUTANEOUS at 07:44

## 2025-04-10 RX ADMIN — ACETAMINOPHEN 650 MG: 325 TABLET ORAL at 06:17

## 2025-04-10 RX ADMIN — OXYCODONE HYDROCHLORIDE 5 MG: 5 TABLET ORAL at 06:17

## 2025-04-10 RX ADMIN — KETOROLAC TROMETHAMINE 15 MG: 30 INJECTION, SOLUTION INTRAMUSCULAR at 00:28

## 2025-04-10 RX ADMIN — AMLODIPINE BESYLATE 2.5 MG: 5 TABLET ORAL at 08:07

## 2025-04-10 RX ADMIN — ACETAMINOPHEN 650 MG: 325 TABLET ORAL at 11:51

## 2025-04-10 RX ADMIN — TRANEXAMIC ACID 1950 MG: 650 TABLET ORAL at 06:17

## 2025-04-10 RX ADMIN — CEFAZOLIN SODIUM 2 G: 2 INJECTION, SOLUTION INTRAVENOUS at 00:29

## 2025-04-10 ASSESSMENT — COGNITIVE AND FUNCTIONAL STATUS - GENERAL
DAILY ACTIVITIY SCORE: 22
MOVING TO AND FROM BED TO CHAIR: A LITTLE
MOBILITY SCORE: 20
DRESSING REGULAR LOWER BODY CLOTHING: A LITTLE
WALKING IN HOSPITAL ROOM: A LITTLE
STANDING UP FROM CHAIR USING ARMS: A LITTLE
HELP NEEDED FOR BATHING: A LITTLE
CLIMB 3 TO 5 STEPS WITH RAILING: A LITTLE

## 2025-04-10 ASSESSMENT — ACTIVITIES OF DAILY LIVING (ADL)
HOME_MANAGEMENT_TIME_ENTRY: 15
BATHING_ASSISTANCE: STAND BY
ADL_ASSISTANCE: INDEPENDENT

## 2025-04-10 ASSESSMENT — PAIN DESCRIPTION - LOCATION: LOCATION: KNEE

## 2025-04-10 ASSESSMENT — PAIN - FUNCTIONAL ASSESSMENT
PAIN_FUNCTIONAL_ASSESSMENT: 0-10

## 2025-04-10 ASSESSMENT — PAIN SCALES - GENERAL
PAINLEVEL_OUTOF10: 2
PAINLEVEL_OUTOF10: 3
PAINLEVEL_OUTOF10: 6
PAINLEVEL_OUTOF10: 3
PAINLEVEL_OUTOF10: 4

## 2025-04-10 ASSESSMENT — PAIN DESCRIPTION - ORIENTATION: ORIENTATION: RIGHT

## 2025-04-10 ASSESSMENT — PAIN SCALES - WONG BAKER: WONGBAKER_NUMERICALRESPONSE: HURTS LITTLE MORE

## 2025-04-10 NOTE — PROGRESS NOTES
Progress Note   TKA    Subjective:     Post-Operative Day: 1 Status Post right Total Knee Arthroplasty  Systemic or Specific Complaints:No Complaints    Objective:     Visit Vitals  /63   Pulse 72   Temp 36.5 °C (97.7 °F) (Temporal)   Resp 18       General: alert and oriented, in no acute distress, appears stated age, and cooperative   Wound: no erythema, no edema, no drainage, and dressing CDI   Motion: Painful range of Motion   DVT Exam: No evidence of DVT seen on physical exam.  Negative Mary's sign.  No significant calf/ankle edema.       Knee swollen but thigh soft to palpation. Moving foot and ankle. Good distal pulses.      Data Review  Recent Results (from the past 24 hours)   POCT GLUCOSE    Collection Time: 04/09/25 10:18 AM   Result Value Ref Range    POCT Glucose 163 (H) 74 - 99 mg/dL   POCT GLUCOSE    Collection Time: 04/09/25 11:51 AM   Result Value Ref Range    POCT Glucose 161 (H) 74 - 99 mg/dL   POCT GLUCOSE    Collection Time: 04/09/25  4:39 PM   Result Value Ref Range    POCT Glucose 196 (H) 74 - 99 mg/dL   POCT GLUCOSE    Collection Time: 04/09/25  7:48 PM   Result Value Ref Range    POCT Glucose 243 (H) 74 - 99 mg/dL   CBC    Collection Time: 04/10/25  5:42 AM   Result Value Ref Range    WBC 12.7 (H) 4.4 - 11.3 x10*3/uL    nRBC 0.0 0.0 - 0.0 /100 WBCs    RBC 3.80 (L) 4.00 - 5.20 x10*6/uL    Hemoglobin 11.2 (L) 12.0 - 16.0 g/dL    Hematocrit 33.9 (L) 36.0 - 46.0 %    MCV 89 80 - 100 fL    MCH 29.5 26.0 - 34.0 pg    MCHC 33.0 32.0 - 36.0 g/dL    RDW 13.2 11.5 - 14.5 %    Platelets 260 150 - 450 x10*3/uL   Basic metabolic panel    Collection Time: 04/10/25  5:42 AM   Result Value Ref Range    Glucose 180 (H) 74 - 99 mg/dL    Sodium 137 136 - 145 mmol/L    Potassium 4.0 3.5 - 5.3 mmol/L    Chloride 107 98 - 107 mmol/L    Bicarbonate 23 21 - 32 mmol/L    Anion Gap 11 10 - 20 mmol/L    Urea Nitrogen 15 6 - 23 mg/dL    Creatinine 0.54 0.50 - 1.05 mg/dL    eGFR >90 >60 mL/min/1.73m*2     Calcium 8.6 8.6 - 10.3 mg/dL   POCT GLUCOSE    Collection Time: 04/10/25  6:34 AM   Result Value Ref Range    POCT Glucose 174 (H) 74 - 99 mg/dL         Assessment:     Status Post right Total Knee Arthroplasty. Doing well postoperatively.     Acute postoperative pain: Reports mild to moderate pain to operative extremity.  Exacerbated by movement, relieved by rest ice and pain medication.  Continue current pain management.    No acute events overnight.       Plan:      1: Discharge today, Return to Clinic: 2 weeks  :  2:  Continue Deep venous thrombosis prophylaxis: ASA 81 mg BID for 30 days started on 4/9/25  3:  Continue physical therapy: WBAT to operative extremity with use of walker for assistance with ambulation   4:  Continue Pain Control  5.   Discharge planning: Plans to return to home with [facility], orders placed. SW and TCC following.     Shaneka Fisher, APRN-CNP

## 2025-04-10 NOTE — DISCHARGE SUMMARY
Discharge summary  This patient Cortney Ruby was admitted to the hospital on 4/9/2025  after undergoing Procedure(s) (LRB):  ARTHROPLASTY, KNEE, TOTAL STANDARD (Right) without complications that morning.    During the postoperative period,while in hospital, patient was medically managed by the hospitalist. Please see medial notes and H&P for patients additional diagnoses.  Ortho agrees with all medical diagnoses and treatments while patient in hospital.  No significant or unexpected findings or abnormal ortho imaging were noted during the hospital stay    Hospital course      Patient tolerated surgical procedure well and there was no complications. Patient progressed adequately through their recovery during hospital stay including PT and rehabilitation.    Patient was then D/C on  to home  in stable condition.  Patient was instructed on the use of pain medications, the signs and symptoms of infection, and was given our number to call should they have any questions or concerns following discharge.    Based on my clinical judgment, the patient was provided with a 7-day prescription for opioid medication at 30 MED, indicated for treatment of acute pain in the setting of recent surgery. OARRS report was run and has demonstrated an appropriate time course.  The patient has been provided with counseling pertaining to safe use of opioid medication.    No acute events during hospitalization. She is stable for discharge to home. She does have small dime sized drop of drainage on mepilex after working with therapy however it does not extend through the boarders. Instructed patient to monitor and call the office if the drainage were to increase.     Patient may WBAT  to operative extremity with use of walker for assistance with ambulation   Aquacel dressing to be removed pod7 and incision left open to air  ASA 81 mg BID  for DVT prophylaxis started on 4/9/25 and to be taken for 30 days  Follow up with surgeon in 2 weeks

## 2025-04-10 NOTE — PROGRESS NOTES
04/10/25 1016   Discharge Planning   Home or Post Acute Services In home services   Type of Home Care Services Home OT;Home PT   Expected Discharge Disposition Home H  ([facility])     Pt is s/p pod #1 Elective Right total knee arthroplasty 4/9/25 with Dr. Azael Cornelius. Pt is weight bearing as tolerated to operative extremity with use of walker for assistance with ambulation. Pt will discharge on Aspirin 81 mg PO BID x 30 days for DVT prophylaxis. [facility] scores are PT (17) OT (pending) recommending continued therapy at low level/intensity. Pt discharge preference remains home with [facility]. Pt grand-daughter home to assist and pt daughter took 2 days off work to assist also.  Demographics verified. [facility]  notified of [facility] referral/HCO in Epic, confirms received and processed for next day start of care 4/11/25.

## 2025-04-10 NOTE — PROGRESS NOTES
Occupational Therapy    Evaluation / Treatment    Patient Name: Cortney Ruby  MRN: [id number]  Department:   Room: [facility]-A  Today's Date: 4/10/2025  Time Calculation  Start Time: 0904  Stop Time: 0934  Time Calculation (min): 30 min      Assessment:  Pt is s/p TKR with increased pain, decreased strength, endurance, and safety with functional transfers and ADL tasks. Patient requires additional training regarding proper techniques to safely complete ADLs. Additional training and education completed this date.   Prognosis: Good  Evaluation/Treatment Tolerance: Patient tolerated treatment well  End of Session Communication: Bedside nurse  End of Session Patient Position: Up in chair, Alarm on  OT Assessment Results: Decreased ADL status, Decreased IADLs, Decreased endurance, Decreased functional mobility    Plan:  OT eval and treatment / education completed. No further skilled OT indicated at this time. Pt verbalizes confidence returning home and resuming self care tasks.   OT Discharge Recommendations: No further acute OT  OT - OK to Discharge: Yes       Subjective   Current Problem:  1. Unilateral primary osteoarthritis, right knee  acetaminophen (Tylenol) 325 mg tablet    aspirin 81 mg EC tablet    cyclobenzaprine (Flexeril) 10 mg tablet    docusate sodium (Colace) 100 mg capsule    oxyCODONE (Roxicodone) 5 mg immediate release tablet      2. Primary osteoarthritis of right knee  Referral to Home Health        General:  General  Reason for Referral: Decline in self care performance; pt admit for elective surgery; s/p R TKA 4/9/25  Referred By: PT/OT referred by Ashli 4/9  Past Medical History Relevant to Rehab: COPD, HTN, Depression, DM, GERD, Vertigo  Family/Caregiver Present: No  Patient Position Received: Up in chair, Alarm on  General Comment: Pt pleasant and cooperative; agreeable to OT.  Precautions:  LE Weight Bearing Status: Weight Bearing as Tolerated  Medical Precautions: Fall precautions  Post-Surgical  Precautions: Right total knee precautions    Pain:  Pain Assessment  Pain Assessment: 0-10  0-10 (Numeric) Pain Score: 3  Pain Type: Acute pain, Surgical pain  Pain Location: Knee  Pain Orientation: Right  Pain Interventions: Elevated    Pain Limitation  If pain is limiting:  Educated patient on positioning to reduce pain  Educated patient on rest/activity to address increase in pain  Adjusted / adapted ADLs/IADLs to reduce pain with these activities  Patient trained for proper mobility/transfer techniques to decrease pain    Objective   Cognition:  Overall Cognitive Status: Within Functional Limits  Processing Speed: Within funtional limits       Home Living:  Home Living Comments: Per pt, lives with granddaughter and great grandson in one level home + basement. Bed/bathroom on first floor, laundry in basement. Ramp enterance. Has tub shower with grab bars; elevated commode. Owns WW and SPC.  Prior Function:  Level of [place]: Independent with ADLs and functional transfers, Independent with homemaking with ambulation  ADL Assistance: Independent  Ambulatory Assistance: Independent  Prior Function Comments: Mod I with WW during ambulation. No falls reported in the last 3 months, pt drives.     ADL:  Eating Assistance: Independent  Grooming Assistance: Stand by  Grooming Deficit: Standing with assistive device  Bathing Assistance: Stand by (sponge bathing)  UE Dressing Deficit: Setup  LE Dressing Assistance: Minimal  LE Dressing Deficit: Requires assistive device for steadying, Increased time to complete, Supervision/safety, Verbal cueing, Steadying, Setup, Use of adaptive equipment, Don/doff R sock, Don/doff L sock, Thread RLE into pants, Thread LLE into pants, Thread RLE into underwear, Thread LLE into underwear, Pull up over hips, Don/doff R shoe, Don/doff L shoe  Toileting Assistance with Device: Stand by  Functional Assistance: Minimal  Functional Deficit: Increased time to complete, Supervision/safety,  Verbal cueing, Steadying, Setup  ADL Comments: ADLs assessed at FWW level.    Activity Tolerance:  Activity Tolerance Comments: Decreased activity tolerance tolerance with increased time for BADL task completion    Skilled BADL Treatment / Intervention  Safety deficit modifications  Compensatory training  Environmental modifications    Progress in BADL Status  Improvement noted  Modified independence level  Cueing required  Use of compensatory strategies    Bed Mobility/Transfers:    Transfers  Transfer: Yes  Transfer 1  Transfer From 1: Sit to, Stand to, Toilet to, Chair with arms to  Transfer Device 1: Walker  Transfer Level of Assistance 1: Close supervision  Trials/Comments 1: Pt educated in safe ADL transfer techniques including tub transfer. Pt demo tub transfer with step over tech with CGA with heavy grab bar use and cues for safety; edu in DME / grab bar placement for home and pt receptive to edu.    Functional Mobility:    SBA at fww level; ambulated to bathroom for ADLs and tub transfer.    Sensation:  Sensation Comment: BUE sensation and proprioception WFL.  Strength:  Strength Comments: BUE AROM and strength WFL for ADL participation.     Coordination:  Movements are Fluid and Coordinated: Yes   Hand Function:  Gross Grasp: Functional  Coordination: Functional    Outcome Measures:[facility] Daily Activity  Putting on and taking off regular lower body clothing: A little  Bathing (including washing, rinsing, drying): A little  Putting on and taking off regular upper body clothing: None  Toileting, which includes using toilet, bedpan or urinal: None  Taking care of personal grooming such as brushing teeth: None  Eating Meals: None  Daily Activity - Total Score: 22    Education Documentation  ADL Training, taught by Erica Ryan OT at 4/10/2025 12:56 PM.  Learner: Patient  Readiness: Acceptance  Method: Explanation, Demonstration  Response: Verbalizes Understanding, Demonstrated Understanding    Goals:  Pt  demonstrate improved ADL task performance and safe functional transfers for safe discharge to least restrictive environment.  Pt goal: To go home.

## 2025-04-10 NOTE — CARE PLAN
Problem: Pain  Goal: Takes deep breaths with improved pain control throughout the shift  Outcome: Progressing  Goal: Turns in bed with improved pain control throughout the shift  Outcome: Progressing  Goal: Walks with improved pain control throughout the shift  Outcome: Progressing  Goal: Performs ADL's with improved pain control throughout shift  Outcome: Progressing  Goal: Participates in PT with improved pain control throughout the shift  Outcome: Progressing  Goal: Free from opioid side effects throughout the shift  Outcome: Progressing  Goal: Free from acute confusion related to pain meds throughout the shift  Outcome: Progressing     Problem: Skin  Goal: Promote/optimize nutrition  Outcome: Progressing

## 2025-04-10 NOTE — HH CARE COORDINATION
Home Care received a Referral for Physical Therapy and Occupational Therapy. We have processed the referral for a Start of Care on 4/11/25.     If you have any questions or concerns, please feel free to contact us at [phone]. Follow the prompts, enter your five digit zip code, and you will be directed to your care team on [facility] 1.

## 2025-04-10 NOTE — CARE PLAN
Problem: Pain  Goal: Takes deep breaths with improved pain control throughout the shift  Outcome: Progressing  Goal: Turns in bed with improved pain control throughout the shift  Outcome: Progressing  Goal: Walks with improved pain control throughout the shift  Outcome: Progressing  Goal: Performs ADL's with improved pain control throughout shift  Outcome: Progressing  Goal: Participates in PT with improved pain control throughout the shift  Outcome: Progressing  Goal: Free from opioid side effects throughout the shift  Outcome: Progressing  Goal: Free from acute confusion related to pain meds throughout the shift  Outcome: Progressing     Problem: Skin  Goal: Promote/optimize nutrition  Outcome: Progressing   The patient's goals for the shift include      The clinical goals for the shift include Pain management and safety

## 2025-04-10 NOTE — PROGRESS NOTES
Physical Therapy    Physical Therapy    Physical Therapy Treatment    Patient Name: Cortney Ruby  MRN: [id number]  Today's Date: 4/10/2025  Time Calculation  Start Time: 1010  Stop Time: 1043  Time Calculation (min): 33 min       Assessment/Plan   PT Assessment  PT Assessment Results: Decreased strength, Decreased range of motion, Decreased endurance, Decreased mobility, Orthopedic restrictions, Pain  Rehab Prognosis: Good  Barriers to Discharge Home: Physical needs  Evaluation/Treatment Tolerance: Patient limited by pain, Patient limited by fatigue  End of Session Communication: Bedside nurse  Assessment Comment:  (Pt progressing toward goals.  Distance with ambulation increased with control of pain.)  End of Session Patient Position: Up in chair, Alarm on  PT Plan  Inpatient/Swing Bed or Outpatient: Inpatient  PT Plan  Treatment/Interventions: Bed mobility, Transfer training, Gait training, Strengthening, Therapeutic exercise, Home exercise program  PT Plan: Ongoing PT  PT Frequency: BID  PT Discharge Recommendations: Low intensity level of continued care  PT Recommended Transfer Status: Assist x1, Assistive device      Current Problem:  1. Unilateral primary osteoarthritis, right knee  acetaminophen (Tylenol) 325 mg tablet    aspirin 81 mg EC tablet    cyclobenzaprine (Flexeril) 10 mg tablet    docusate sodium (Colace) 100 mg capsule    oxyCODONE (Roxicodone) 5 mg immediate release tablet      2. Primary osteoarthritis of right knee  Referral to Home Health          General Visit Information:   PT  Visit  PT Received On: 04/10/25  General  Reason for Referral: R TKR  Family/Caregiver Present: No  Prior to Session Communication: Bedside nurse  Patient Position Received: Up in chair, Alarm on  Preferred Learning Style: verbal  General Comment:  (Pt sitting up in recliner- ice to R knee.  Pt agreeable to therapy.  Pt atates she had no problem with bed mobility this AM.  Pt states she has no steps at her  house.)  Subjective     Precautions:  Precautions  LE Weight Bearing Status: Weight Bearing as Tolerated  Medical Precautions: Fall precautions         Objective     Pain:  Pain Assessment  Pain Assessment: 0-10  0-10 (Numeric) Pain Score: 3  Pain Type: Surgical pain, Acute pain  Pain Location: Knee  Pain Orientation: Right  Pain Interventions: Cold applied      Extremity/Trunk Assessments:        AROM RLE (degrees)  R Knee Flexion 0-130: 85 (Heel slides on EOC   + SLR)  R Knee Extension 0-130: 0 (Passive stretching in longsitting position.)         Treatments:  Therapeutic Exercise  Therapeutic Exercise Performed: Yes  Therapeutic Exercise Activity 1: Pt performed supine ther ex including ankle pumps, quad sets, glut sets B/L LEs and heel slides and SAQ R  LE 10 reps x1, SLR 5 reps x2. Cues for technique and breathing.  Therapeutic Exercise Activity 2: Pt performed seated ther ex including heel/toe raises and hip ABD, LAQ, heel slides, and marches 10 reps x1. Reviewed home exercise program. Educated on goal for 2-3 sets of 20 reps to tolerance. Pt with good understanding.           Ambulation/Gait Training  Ambulation/Gait Training Performed: Yes  Ambulation/Gait Training 1  Surface 1: Level tile  Device 1: Rolling walker  Assistance 1: Distant supervision  Quality of Gait 1: Inconsistent stride length, Decreased step length  Comments/Distance (ft) 1:  (Pt ambulates with WW  WBAT with uneven strides with step through gait pablo 75 ftx2 with distant supervision.  V/c to heel-to-toe strike to encourage flex/ ext into R knee.)  Transfers  Transfer: Yes  Transfer 1  Technique 1: Sit to stand, Stand to sit  Transfer Device 1: Walker  Transfer Level of Assistance 1: Close supervision  Trials/Comments 1:  (Pt transfers with WW  WBAT with close supervision.)          Outcome Measures:  [facility] Basic Mobility  Turning from your back to your side while in a flat bed without using bedrails: None  Moving from lying on your back to  sitting on the side of a flat bed without using bedrails: None  Moving to and from bed to chair (including a wheelchair): A little  Standing up from a chair using your arms (e.g. wheelchair or bedside chair): A little  To walk in hospital room: A little  Climbing 3-5 steps with railing: A little  Basic Mobility - Total Score: 20  Education Documentation  No documentation found.  Education Comments  No comments found.        EDUCATION:    Individual(s) Educated: Patient  Education Provided: Body Mechanics, Fall Risk, Home Exercise Program, Home Safety  Equipment: Ice Pack  Risk and Benefits Discussed with Patient/Caregiver/Other: yes  Patient/Caregiver Demonstrated Understanding: yes  Plan of Care Discussed and Agreed Upon: yes  Patient Response to Education: Patient/Caregiver Verbalized Understanding of Information, Patient/Caregiver Performed Return Demonstration of Exercises/Activities, Patient/Caregiver Asked Appropriate Questions  Encounter Problems       Encounter Problems (Active)       PT Problem       Pt will completed supine <> sit bed mobility from flat bed with [place]  (Met)       Start:  04/09/25    Expected End:  04/23/25    Resolved:  04/10/25         Pt will demonstrate sit to stand transfers with WW + Mod I  (Progressing)       Start:  04/09/25    Expected End:  04/23/25            Pt. will ambulate >100ft with WW + Mod I  (Progressing)       Start:  04/09/25    Expected End:  04/23/25            Pt will achieve 0-90 deg R knee AROM to aid in functional mobility (Progressing)       Start:  04/09/25    Expected End:  04/23/25            Pt will complete R TKA strengthening Hep with independence (Progressing)       Start:  04/09/25    Expected End:  04/23/25               Safety       LTG - Patient will adhere to hip precautions during ADL's and transfers       Start:  04/09/25            LTG - Patient will demonstrate safety requirements appropriate to situation/environment       Start:   04/09/25            LTG - Patient will utilize safety techniques       Start:  04/09/25            STG - Patient locks brakes on wheelchair       Start:  04/09/25            STG - Patient uses call light consistently to request assistance with transfers       Start:  04/09/25            STG - Patient uses gait belt during all transfers       Start:  04/09/25            Goal 1       Start:  04/09/25            Goal 2       Start:  04/09/25            Goal 3       Start:  04/09/25               .   Applied

## 2025-04-11 ENCOUNTER — HOME CARE VISIT (OUTPATIENT)
Dept: HOME HEALTH SERVICES | Facility: HOME HEALTH | Age: 65
End: 2025-04-11
Payer: COMMERCIAL

## 2025-04-11 VITALS — TEMPERATURE: 98.3 F | HEART RATE: 67 BPM | OXYGEN SATURATION: 94 %

## 2025-04-11 PROCEDURE — G0151 HHCP-SERV OF PT,EA 15 MIN: HCPCS

## 2025-04-11 PROCEDURE — G0152 HHCP-SERV OF OT,EA 15 MIN: HCPCS

## 2025-04-11 ASSESSMENT — ACTIVITIES OF DAILY LIVING (ADL)
ENTERING_EXITING_HOME: STAND BY ASSIST
FEEDING_WITHIN_DEFINED_LIMITS: 1
DRESSING_UB_CURRENT_FUNCTION: INDEPENDENT
AMBULATION_DISTANCE/DURATION_TOLERATED: 25'
TOILETING: INDEPENDENT
AMBULATION ASSISTANCE ON FLAT SURFACES: 1
PHYSICAL_TRANSFERS_DEVICES: WW
AMBULATION ASSISTANCE: 1
CURRENT_FUNCTION: INDEPENDENT
PHYSICAL TRANSFERS ASSESSED: 1
GROOMING_WITHIN_DEFINED_LIMITS: 1
AMBULATION ASSISTANCE: INDEPENDENT
DRESSING_LB_CURRENT_FUNCTION: INDEPENDENT
BATHING_WITHIN_DEFINED_LIMITS: 1
TOILETING: 1
OASIS_M1830: 03

## 2025-04-11 ASSESSMENT — ENCOUNTER SYMPTOMS
PAIN SEVERITY GOAL: 2/10
PAIN: 1
PAIN LOCATION - EXACERBATING FACTORS: SITTING TOO LONG
LOWEST PAIN SEVERITY IN PAST 24 HOURS: 4/10
PAIN LOCATION - PAIN FREQUENCY: CONSTANT
HIGHEST PAIN SEVERITY IN PAST 24 HOURS: 9/10
PAIN LOCATION - PAIN SEVERITY: 8/10
LOWEST PAIN SEVERITY IN PAST 24 HOURS: 3/10
SUBJECTIVE PAIN PROGRESSION: GRADUALLY IMPROVING
PAIN: 1
PERSON REPORTING PAIN: PATIENT
PAIN LOCATION: RIGHT KNEE
PAIN LOCATION - RELIEVING FACTORS: ICE, MEDS
PERSON REPORTING PAIN: PATIENT
PAIN LOCATION: RIGHT KNEE
HIGHEST PAIN SEVERITY IN PAST 24 HOURS: 9/10
PAIN LOCATION - PAIN SEVERITY: 4/10
PAIN LOCATION - PAIN QUALITY: ACHE, THROB
SUBJECTIVE PAIN PROGRESSION: GRADUALLY IMPROVING

## 2025-04-14 ENCOUNTER — APPOINTMENT (OUTPATIENT)
Dept: PHARMACY | Facility: HOSPITAL | Age: 65
End: 2025-04-14
Payer: COMMERCIAL

## 2025-04-14 DIAGNOSIS — E66.01 MORBID OBESITY (MULTI): ICD-10-CM

## 2025-04-14 DIAGNOSIS — Z23 NEED FOR VACCINATION: ICD-10-CM

## 2025-04-14 DIAGNOSIS — E11.65 TYPE 2 DIABETES MELLITUS WITH HYPERGLYCEMIA, WITH LONG-TERM CURRENT USE OF INSULIN: ICD-10-CM

## 2025-04-14 DIAGNOSIS — E11.9 TYPE 2 DIABETES MELLITUS WITHOUT COMPLICATION, WITH LONG-TERM CURRENT USE OF INSULIN: ICD-10-CM

## 2025-04-14 DIAGNOSIS — Z79.4 TYPE 2 DIABETES MELLITUS WITHOUT COMPLICATION, WITH LONG-TERM CURRENT USE OF INSULIN: ICD-10-CM

## 2025-04-14 DIAGNOSIS — J44.9 CHRONIC OBSTRUCTIVE PULMONARY DISEASE, UNSPECIFIED COPD TYPE (MULTI): ICD-10-CM

## 2025-04-14 DIAGNOSIS — E78.2 MIXED HYPERLIPIDEMIA: ICD-10-CM

## 2025-04-14 DIAGNOSIS — I10 ESSENTIAL HYPERTENSION: ICD-10-CM

## 2025-04-14 DIAGNOSIS — Z00.00 ENCOUNTER FOR MEDICARE ANNUAL WELLNESS EXAM: ICD-10-CM

## 2025-04-14 DIAGNOSIS — Z79.4 TYPE 2 DIABETES MELLITUS WITH HYPERGLYCEMIA, WITH LONG-TERM CURRENT USE OF INSULIN: ICD-10-CM

## 2025-04-14 PROCEDURE — RXMED WILLOW AMBULATORY MEDICATION CHARGE

## 2025-04-14 RX ORDER — SEMAGLUTIDE 1.34 MG/ML
1 INJECTION, SOLUTION SUBCUTANEOUS WEEKLY
Qty: 3 ML | Refills: 1 | Status: SHIPPED | OUTPATIENT
Start: 2025-04-14

## 2025-04-14 NOTE — ASSESSMENT & PLAN NOTE
Patient's goal A1c is < 7%.  Is pt at goal? Yes, 6% on 3.26.25  Patient's SMBGs are at goal.  Rationale for plan: Will plan to increase Ozempic dose today and reduce insulin as listed below:    Medication Changes:  Decrease:  Humalog Mix 75-25 - 30 units subcutaneously BID  RE-START:  Ozempic 0.5 mg subcutaneously once weekly until gone, then increase to 1 mg weekly therafter    Future Considerations:  Titrate GLP1  Start SGLT2i  Consider ACEi for renoprotective properties  Decrease insulin dosage    Monitoring and Education:  Ozempic Education:   Counseled patient on Ozempic MOA, expectations, side effects, duration of therapy, administration, and monitoring parameters.  Provided detailed dosing and administration counseling to ensure proper technique.   Reviewed Ozempic titration schedule, starting with 0.25 mg once weekly for 4 weeks, then continuing on 0.5 mg once weekly. Pt verbalized understanding.  Counseled patient on the benefits of GLP-1ra, such as cardiovascular risk reduction, glycemic control, and weight loss potential.  Reviewed storage requirements of Ozempic when not in use, and when to administer the medication if a dose is missed.  Advised patient that they may experience improved satiety after meals and portion sizes of meals may be reduced as doses of Ozempic increase.  Counseled patient to avoid foods that are fatty/oily as this may precipitate the nausea/GI upset that may occur with new start Ozempic.

## 2025-04-14 NOTE — PROGRESS NOTES
Clinical Pharmacy Appointment    Patient ID: Cortney Ruby is a 64 y.o. female who presents for Diabetes.    Pt is here for Follow Up appointment.     Referring Provider: Mayra Lewis, *  PCP: Mayra Lewis MD   Last visit with PCP: 3.18.25   Next visit with PCP: 9.19.25    Subjective   Interval History:  Had knee replacement  PT --> needs to schedule outpatient  Pain; worse at night  Icing knee often  DM  Restarting Ozempic 0.5 mg   SE: denies  Significant appetite suppression; reduced portions  Weight loss fluctuating  SMBGs mostly at goal - applauded patient for continued efforts    HPI  DIABETES MELLITUS TYPE 2:    Diagnosed with diabetes: > 5 years ago. Known diabetic complications: smoker, HTN, HLD, obesity, COPD.  Does patient follow with Endocrinology: Yes, Dr. Kyle Villalta  Last optometry exam: Not assessed today  Most recent visit in Podiatry: Not assessed today      Current diabetic medications include:  Humalog Mix 75-25 - 40 units subcutaneously BID  Ozempic 0.5 mg subcutaneously once weekly on Mondays    Glucose Readings:  Glucometer/CGM Type: glucometer    Current home BG readings: At goal    Any episodes of hypoglycemia? No, patient denies .  Did patient treat episode of hypoglycemia appropriately? N/A  Does pt have proteinuria? No - UACr 11.3 on 8.27.24    Lifestyle:  Diet: Improving; decreased portions  Physical Activity: Limited    Secondary Prevention:  Statin? Yes - atorvastatin 40 mg daily  ACE-I/ARB? No  Aspirin? No    Pertinent PMH Review:  PMH of Pancreatitis: No  PMH of Retinopathy: No  PMH of Urinary Tract Infections: No  PMH of MTC: No    Drug Interactions  No relevant drug interactions were noted.    Objective   Allergies   Allergen Reactions    Norco [Hydrocodone-Acetaminophen] Nausea/vomiting     Social History     Social History Narrative    Not on file      Medication Review  Current Outpatient Medications   Medication Instructions    acetaminophen (TYLENOL)  "650 mg, oral, Every 6 hours scheduled    amLODIPine (NORVASC) 2.5 mg, oral, Daily    aspirin 81 mg, oral, 2 times daily    atorvastatin (LIPITOR) 40 mg, oral, Daily    BD Ultra-Fine Micro Pen Needle 32 gauge x 1/4\" needle USE TWICE DAILY    Combivent Respimat  mcg/actuation inhaler 1 puff, inhalation, 4 times daily RT    cyclobenzaprine (FLEXERIL) 10 mg, oral, 3 times daily PRN    docusate sodium (COLACE) 100 mg, oral, 2 times daily    fluticasone-umeclidin-vilanter (Trelegy Ellipta) 100-62.5-25 mcg blister with device 1 puff, inhalation, Daily    ibuprofen 800 mg, oral, Every 8 hours PRN    naloxone (Narcan) 4 mg/0.1 mL nasal spray Instill 1 spray intranasally for opioid overdose; repeat in 5 minutes if no response.    NovoLOG Mix 70-30FlexPen U-100 100 unit/mL (70-30) pen ADMINISTER 40 UNITS UNDER THE SKIN TWICE DAILY    OneTouch Delica Plus Lancet 33 gauge misc USE DAILY    OneTouch Ultra Test strip TEST BID AND PRN    oxyCODONE (ROXICODONE) 5 mg, oral, Every 6 hours PRN    Ozempic 0.5 mg, subcutaneous, Once Weekly      Vitals  BP Readings from Last 2 Encounters:   04/10/25 120/63   04/02/25 132/80     BMI Readings from Last 1 Encounters:   04/09/25 34.71 kg/m²      Labs  A1C  Lab Results   Component Value Date    HGBA1C 6.0 (H) 03/26/2025    HGBA1C 7.5 (H) 11/27/2024    HGBA1C 9.3 (H) 08/27/2024     BMP  Lab Results   Component Value Date    CALCIUM 8.6 04/10/2025     04/10/2025    K 4.0 04/10/2025    CO2 23 04/10/2025     04/10/2025    BUN 15 04/10/2025    CREATININE 0.54 04/10/2025    EGFR >90 04/10/2025     LFTs  Lab Results   Component Value Date    ALT 25 03/26/2025    AST 18 03/26/2025    ALKPHOS 83 03/26/2025    BILITOT 0.7 03/26/2025     FLP  Lab Results   Component Value Date    TRIG 206 (H) 03/26/2025    CHOL 144 03/26/2025    LDLF 62 01/11/2022    LDLCALC 52 03/26/2025    HDL 51.1 03/26/2025     Urine Microalbumin  Lab Results   Component Value Date    MICROALBCREA 11.3 08/27/2024 "     Weight Management  Wt Readings from Last 3 Encounters:   04/09/25 94.6 kg (208 lb 8.9 oz)   04/02/25 97.4 kg (214 lb 12.8 oz)   03/26/25 96.9 kg (213 lb 10 oz)      There is no height or weight on file to calculate BMI.     Assessment/Plan   Problem List Items Addressed This Visit       Essential hypertension    Mixed hyperlipidemia    Morbid obesity (Multi)    Type 2 diabetes mellitus with hyperglycemia, with long-term current use of insulin     Patient's goal A1c is < 7%.  Is pt at goal? Yes, 6% on 3.26.25  Patient's SMBGs are at goal.  Rationale for plan: Will plan to increase Ozempic dose today and reduce insulin as listed below:    Medication Changes:  Decrease:  Humalog Mix 75-25 - 30 units subcutaneously BID  RE-START:  Ozempic 0.5 mg subcutaneously once weekly until gone, then increase to 1 mg weekly therafter    Future Considerations:  Titrate GLP1  Start SGLT2i  Consider ACEi for renoprotective properties  Decrease insulin dosage    Monitoring and Education:  Ozempic Education:   Counseled patient on Ozempic MOA, expectations, side effects, duration of therapy, administration, and monitoring parameters.  Provided detailed dosing and administration counseling to ensure proper technique.   Reviewed Ozempic titration schedule, starting with 0.25 mg once weekly for 4 weeks, then continuing on 0.5 mg once weekly. Pt verbalized understanding.  Counseled patient on the benefits of GLP-1ra, such as cardiovascular risk reduction, glycemic control, and weight loss potential.  Reviewed storage requirements of Ozempic when not in use, and when to administer the medication if a dose is missed.  Advised patient that they may experience improved satiety after meals and portion sizes of meals may be reduced as doses of Ozempic increase.  Counseled patient to avoid foods that are fatty/oily as this may precipitate the nausea/GI upset that may occur with new start Ozempic.          Chronic obstructive pulmonary disease  (Multi)     Other Visit Diagnoses       Type 2 diabetes mellitus without complication, with long-term current use of insulin        Encounter for Medicare annual wellness exam        Need for vaccination              Clinical Pharmacist follow-up: 5/19/25 @ 1:20 pm, Telehealth visit    Continue all meds under the continuation of care with the referring provider and clinical pharmacy team.    Thank you,  Luz Sharp, PharmD  Clinical Pharmacist  [phone]    Verbal consent to manage patient's drug therapy was obtained from the patient. They were informed they may decline to participate or withdraw from participation in pharmacy services at any time.

## 2025-04-16 ENCOUNTER — HOME CARE VISIT (OUTPATIENT)
Dept: HOME HEALTH SERVICES | Facility: HOME HEALTH | Age: 65
End: 2025-04-16
Payer: COMMERCIAL

## 2025-04-16 ENCOUNTER — PHARMACY VISIT (OUTPATIENT)
Dept: PHARMACY | Facility: CLINIC | Age: 65
End: 2025-04-16
Payer: COMMERCIAL

## 2025-04-16 VITALS
TEMPERATURE: 98.8 F | OXYGEN SATURATION: 96 % | DIASTOLIC BLOOD PRESSURE: 76 MMHG | SYSTOLIC BLOOD PRESSURE: 134 MMHG | HEART RATE: 78 BPM

## 2025-04-16 PROCEDURE — G0157 HHC PT ASSISTANT EA 15: HCPCS | Mod: CQ

## 2025-04-16 SDOH — HEALTH STABILITY: PHYSICAL HEALTH
EXERCISE COMMENTS: STRETCH AND MANUAL THERAPY TO ANTERIOR/POSTERIOR KNEE WITH REINFORCEMENT FOR TIMING AND INTENSITY OF STRETCH, MUSCLE ACTIVITY THROUGH AVAILABLE RANGE KNEE FLEXION/EXTENSION, AAROM MEASURES 4-100 DEGREES.  COMPLETED SUPINE ANKLE PUMPS, QUAD SETTING, S

## 2025-04-16 SDOH — HEALTH STABILITY: PHYSICAL HEALTH
EXERCISE COMMENTS: AQ, SLR, HEEL SLIDES, STANDING HEEL RAISE, TOE RAISE, ALTERNATING KNEE FLEXION, WITH INSTRUCTIONS FOR ALIGNMENT, PACING, BREATHING OUT WITH EXERTION AND USE OF AVAILABLE RANGE 5X3 SETS.

## 2025-04-16 ASSESSMENT — ENCOUNTER SYMPTOMS
PAIN LOCATION - PAIN SEVERITY: 5/10
PAIN LOCATION: RIGHT KNEE
PERSON REPORTING PAIN: PATIENT
HIGHEST PAIN SEVERITY IN PAST 24 HOURS: 8/10
PAIN: 1

## 2025-04-18 ENCOUNTER — HOME CARE VISIT (OUTPATIENT)
Dept: HOME HEALTH SERVICES | Facility: HOME HEALTH | Age: 65
End: 2025-04-18
Payer: COMMERCIAL

## 2025-04-18 VITALS
TEMPERATURE: 98.5 F | SYSTOLIC BLOOD PRESSURE: 127 MMHG | HEART RATE: 91 BPM | DIASTOLIC BLOOD PRESSURE: 65 MMHG | OXYGEN SATURATION: 94 %

## 2025-04-18 PROCEDURE — G0157 HHC PT ASSISTANT EA 15: HCPCS | Mod: CQ

## 2025-04-18 SDOH — HEALTH STABILITY: PHYSICAL HEALTH
EXERCISE COMMENTS: COMPLETED SUPINE ANKLE PUMPS, QUAD SETTING, SAQ, SLR, HEEL SLIDES, STANDING HEEL RAISE, TOE RAISE, MINI-SQUATS, ALTERNATING KNEE FLEXION, HIP ABDUCTION WITH INSTRUCTIONS FOR ALIGNMENT, PACING, BREATHING OUT WITH EXERTION AND USE OF AVAILABLE RANGE 15

## 2025-04-18 SDOH — HEALTH STABILITY: PHYSICAL HEALTH
EXERCISE COMMENTS: REPS.  STRETCH AND MANUAL THERAPY TO ANTERIOR/POSTERIOR KNEE WITH REINFORCEMENT FOR TIMING AND INTENSITY OF STRETCH, MUSCLE ACTIVITY THROUGH AVAILABLE RANGE KNEE FLEXION/EXTENSION, AAROM MEASURES 3-105 RIGHT KNEE.

## 2025-04-18 ASSESSMENT — ENCOUNTER SYMPTOMS
PAIN LOCATION - PAIN SEVERITY: 4/10
PAIN LOCATION: RIGHT KNEE
HIGHEST PAIN SEVERITY IN PAST 24 HOURS: 7/10
PAIN: 1
PERSON REPORTING PAIN: PATIENT

## 2025-04-22 ENCOUNTER — HOME CARE VISIT (OUTPATIENT)
Dept: HOME HEALTH SERVICES | Facility: HOME HEALTH | Age: 65
End: 2025-04-22
Payer: COMMERCIAL

## 2025-04-22 VITALS
TEMPERATURE: 98.5 F | DIASTOLIC BLOOD PRESSURE: 68 MMHG | OXYGEN SATURATION: 98 % | HEART RATE: 87 BPM | SYSTOLIC BLOOD PRESSURE: 143 MMHG

## 2025-04-22 DIAGNOSIS — Z96.651 S/P TOTAL KNEE ARTHROPLASTY, RIGHT: Primary | ICD-10-CM

## 2025-04-22 PROCEDURE — G0157 HHC PT ASSISTANT EA 15: HCPCS | Mod: CQ

## 2025-04-22 SDOH — HEALTH STABILITY: PHYSICAL HEALTH
EXERCISE COMMENTS: STRETCH AND MANUAL THERAPY TO ANTERIOR/POSTERIOR KNEE WITH REINFORCEMENT FOR TIMING AND INTENSITY OF STRETCH, MUSCLE ACTIVITY THROUGH AVAILABLE RANGE KNEE FLEXION/EXTENSION AAROM MEASURES 1-110 DEGREES.  COMPLETED SUPINE ANKLE PUMPS, QUAD SETTING, SA

## 2025-04-22 SDOH — HEALTH STABILITY: PHYSICAL HEALTH
EXERCISE COMMENTS: Q, SLR, HEEL SLIDES, STANDING HEEL RAISE, TOE RAISE, MINI-SQUATS, ALTERNATING KNEE FLEXION, HIP ABDUCTION WITH INSTRUCTIONS FOR ALIGNMENT, PACING, BREATHING OUT WITH EXERTION AND USE OF AVAILABLE RANGE 5X3 SETS.

## 2025-04-22 ASSESSMENT — ENCOUNTER SYMPTOMS
HIGHEST PAIN SEVERITY IN PAST 24 HOURS: 10/10
PAIN LOCATION: RIGHT KNEE
PAIN: 1
PERSON REPORTING PAIN: PATIENT
PAIN LOCATION - PAIN SEVERITY: 10/10

## 2025-04-24 ENCOUNTER — HOME CARE VISIT (OUTPATIENT)
Dept: HOME HEALTH SERVICES | Facility: HOME HEALTH | Age: 65
End: 2025-04-24
Payer: COMMERCIAL

## 2025-04-24 PROCEDURE — G0151 HHCP-SERV OF PT,EA 15 MIN: HCPCS

## 2025-04-24 SDOH — HEALTH STABILITY: PHYSICAL HEALTH
EXERCISE COMMENTS: IS INDEP WITH HEP. SUPINE ANKLE P DORSI, QUAD ISOMET, SLR, TKE. SEATED HEEL SLIDES. STAND AT SINK KNEE FLEX, HIP ABD, PARTIAL SQUATS, HEEL RAISES

## 2025-04-24 ASSESSMENT — ENCOUNTER SYMPTOMS
PAIN: 1
SUBJECTIVE PAIN PROGRESSION: GRADUALLY IMPROVING
PAIN SEVERITY GOAL: 1/10
PAIN LOCATION: RIGHT KNEE
LOWEST PAIN SEVERITY IN PAST 24 HOURS: 4/10
PERSON REPORTING PAIN: PATIENT
HIGHEST PAIN SEVERITY IN PAST 24 HOURS: 8/10

## 2025-04-24 ASSESSMENT — ACTIVITIES OF DAILY LIVING (ADL)
AMBULATION ASSISTANCE ON FLAT SURFACES: 1
OASIS_M1830: 01
HOME_HEALTH_OASIS: 00

## 2025-04-24 NOTE — HOME HEALTH
reports compliance with hep. r knee pain 4 to 8/10. to see dr kraus [id number], start out pt. p.t. [id number].

## 2025-04-28 DIAGNOSIS — Z79.4 TYPE 2 DIABETES MELLITUS WITH HYPERGLYCEMIA, WITH LONG-TERM CURRENT USE OF INSULIN: Primary | ICD-10-CM

## 2025-04-28 DIAGNOSIS — E11.65 TYPE 2 DIABETES MELLITUS WITH HYPERGLYCEMIA, WITH LONG-TERM CURRENT USE OF INSULIN: Primary | ICD-10-CM

## 2025-04-28 NOTE — PROGRESS NOTES
No chief complaint on file.      The patient is here for follow-up of their side: right knee arthroplasty.  The patient has mild knee pain.  The patient has no mechanical symptoms.  The patient has mild swelling.  The patient is approximately 3 week(s) postop    Physical examination:    Examination of the side: right knee  The incision is healing well  No erythema or warmth.  No instability varus or valgus stressing the knee at 0, 30 or 60 degrees.  No instability in the AP plane at 90 degrees.  Range of motion: 0 degrees extension, 90 degrees flexion  There is mild tenderness  Calf is soft, Homans negative  The patient has intact ankle dorsiflexion and plantarflexion.    Radiographs:   See dictated report        Impression:  Status post side: right total knee arthroplasty    Plan:  Finish aspirin 81 mg twice daily  Outpatient physical therapy  Follow up in  9 weeks with XR   All questions answered

## 2025-04-29 ENCOUNTER — HOSPITAL ENCOUNTER (OUTPATIENT)
Dept: RADIOLOGY | Facility: HOSPITAL | Age: 65
Discharge: HOME | End: 2025-04-29
Payer: COMMERCIAL

## 2025-04-29 ENCOUNTER — APPOINTMENT (OUTPATIENT)
Dept: ORTHOPEDIC SURGERY | Facility: CLINIC | Age: 65
End: 2025-04-29
Payer: COMMERCIAL

## 2025-04-29 DIAGNOSIS — Z96.651 S/P TOTAL KNEE ARTHROPLASTY, RIGHT: ICD-10-CM

## 2025-04-29 DIAGNOSIS — Z96.651 S/P TOTAL KNEE ARTHROPLASTY, RIGHT: Primary | ICD-10-CM

## 2025-04-29 PROCEDURE — 73562 X-RAY EXAM OF KNEE 3: CPT | Mod: RIGHT SIDE | Performed by: RADIOLOGY

## 2025-04-29 PROCEDURE — 99024 POSTOP FOLLOW-UP VISIT: CPT | Performed by: ORTHOPAEDIC SURGERY

## 2025-04-29 PROCEDURE — 3044F HG A1C LEVEL LT 7.0%: CPT | Performed by: ORTHOPAEDIC SURGERY

## 2025-04-29 PROCEDURE — 3048F LDL-C <100 MG/DL: CPT | Performed by: ORTHOPAEDIC SURGERY

## 2025-04-29 PROCEDURE — 73562 X-RAY EXAM OF KNEE 3: CPT | Mod: RT

## 2025-04-30 ENCOUNTER — EVALUATION (OUTPATIENT)
Dept: PHYSICAL THERAPY | Facility: HOSPITAL | Age: 65
End: 2025-04-30
Payer: COMMERCIAL

## 2025-04-30 DIAGNOSIS — R29.898 WEAKNESS OF RIGHT LOWER EXTREMITY: ICD-10-CM

## 2025-04-30 DIAGNOSIS — G89.29 CHRONIC PAIN OF RIGHT KNEE: Primary | ICD-10-CM

## 2025-04-30 DIAGNOSIS — M25.561 CHRONIC PAIN OF RIGHT KNEE: Primary | ICD-10-CM

## 2025-04-30 DIAGNOSIS — Z96.651 S/P TOTAL KNEE ARTHROPLASTY, RIGHT: ICD-10-CM

## 2025-04-30 PROCEDURE — 97161 PT EVAL LOW COMPLEX 20 MIN: CPT | Mod: GP | Performed by: PHYSICAL THERAPIST

## 2025-04-30 PROCEDURE — 97110 THERAPEUTIC EXERCISES: CPT | Mod: GP | Performed by: PHYSICAL THERAPIST

## 2025-04-30 ASSESSMENT — PAIN - FUNCTIONAL ASSESSMENT: PAIN_FUNCTIONAL_ASSESSMENT: 0-10

## 2025-04-30 ASSESSMENT — PAIN SCALES - GENERAL: PAINLEVEL_OUTOF10: 6

## 2025-04-30 NOTE — PROGRESS NOTES
Physical Therapy    Physical Therapy Evaluation and Treatment      Patient Name: Cortney Ruby  MRN: [id number]  Today's Date: 4/30/2025    Time Entry:   Time Calculation  Start Time: 1146  Stop Time: 1225  Time Calculation (min): 39 min  PT Evaluation Time Entry  PT Evaluation (Low) Time Entry: 20  PT Therapeutic Procedures Time Entry  Therapeutic Exercise Time Entry: 15                   Assessment:  This 65 yo pleasant female is s/p Rt TKR 4/9/25.  She just finished with her home health PT.  She walking into the clinic with her wheeled walker with minimal antalgia.  Her incision looks good and no signs of infection or drainage.  No calf tenderness, popliteal tenderness noted.  Mild Rt knee/LE edema.  Decreased painful Rt knee ROM, strength, balance, gait and overall function noted.  She's having some difficulty sleeping at night and is taking Tylenol PM.  Difficulty with some of her basic and instrumental ADL's and work activities around the house.        Plan:  Continue with skilled PT as needed.        Current Problem:   1. Chronic pain of right knee  Follow Up In Physical Therapy      2. Weakness of right lower extremity  Follow Up In Physical Therapy          Subjective  Pt reports her Rt knee is sore and painful but slowly getting better.      Pain:  Pain Assessment  Pain Assessment: 0-10  0-10 (Numeric) Pain Score: 6  Pain Type: Surgical pain, Chronic pain  Pain Location: Knee  Pain Orientation: Right    Objective     AROM:   Rt knee 0 to 105 degrees of flexion    Passively to 110 degrees of flexion      Strength:  Rt hip flexion and abduction 4_/5    Rt knee 4-4/5 gross strength      Posture:  Mild Rt knee edema      Palpation:  Tenderness in her popliteal region.  No calf tenderness      Functional Mobility:  Independent with her transfers      Balance:  Good standing and walking balance with her wheeled walker      Special Tests:  Negative Mary's    Outcome Measures:  Other Measures  Lower Extremity  Funtional Score (LEFS): 30/80     Treatments:    SLR's, flexion and abduction, 10 reps x 2 *  NuStep, L2, 9 minutes  TR/HR's, 1/2 roll, 20 reps       Goals:     0/10 Rt knee pain.  Rt knee AROM 0 - 110-120 degrees of flexion.  Rt knee/LE 5/5 strength grossly throughout.  Minimal antalgia to normal gait pattern with least restrictive device.  Pt walk up and down a flight of stairs with 1 rail with reciprocal gait pattern.  No difficulty or pain performing basic and instrumental ADL's and work activities around the house.  Independent with HEP.

## 2025-05-06 ENCOUNTER — TREATMENT (OUTPATIENT)
Dept: PHYSICAL THERAPY | Facility: HOSPITAL | Age: 65
End: 2025-05-06
Payer: COMMERCIAL

## 2025-05-06 DIAGNOSIS — G89.29 CHRONIC PAIN OF RIGHT KNEE: ICD-10-CM

## 2025-05-06 DIAGNOSIS — M25.561 CHRONIC PAIN OF RIGHT KNEE: ICD-10-CM

## 2025-05-06 DIAGNOSIS — R29.898 WEAKNESS OF RIGHT LOWER EXTREMITY: ICD-10-CM

## 2025-05-06 PROCEDURE — 97110 THERAPEUTIC EXERCISES: CPT | Mod: GP | Performed by: PHYSICAL THERAPIST

## 2025-05-06 ASSESSMENT — PAIN - FUNCTIONAL ASSESSMENT: PAIN_FUNCTIONAL_ASSESSMENT: 0-10

## 2025-05-06 ASSESSMENT — PAIN SCALES - GENERAL: PAINLEVEL_OUTOF10: 5 - MODERATE PAIN

## 2025-05-06 NOTE — PROGRESS NOTES
"Physical Therapy    Physical Therapy Treatment    Patient Name: Cortney Ruby  MRN: [id number]  Today's Date: 5/6/2025    Time Entry:   Time Calculation  Start Time: 0216  Stop Time: 0259  Time Calculation (min): 43 min     PT Therapeutic Procedures Time Entry  Therapeutic Exercise Time Entry: 43                   Assessment:  Pt walking into the clinic this afternoon with her st cane and mild antalgia.  Slightly more ROM noted today.  She's progressing nicely.  Initiated 4\" forward step ups today.        Plan:  Continue with skilled PT      Current Problem  1. Chronic pain of right knee  Follow Up In Physical Therapy      2. Weakness of right lower extremity  Follow Up In Physical Therapy          Subjective  Pt states her Rt knee is sore and a little more swollen today.    Pain  Pain Assessment  Pain Assessment: 0-10  0-10 (Numeric) Pain Score: 5 - Moderate pain  Pain Type: Surgical pain, Chronic pain  Pain Location: Knee  Pain Orientation: Right    Objective     AROM:   Rt knee 0 to 105 degrees of flexion     Passively to 115 degrees of flexion       Treatments:    SLR's, flexion and abduction, 15 reps x 2 *  Heel slides with strap, 3 sec, 30 reps   NuStep, L2, 9 minutes  TR/HR's, 1/2 roll, 20 reps   Standing marching in place, 20 reps  Standing HSC, 20 reps   Squats, 20 reps   4\" forward step ups, 10 reps x 2     "

## 2025-05-08 ENCOUNTER — TREATMENT (OUTPATIENT)
Dept: PHYSICAL THERAPY | Facility: HOSPITAL | Age: 65
End: 2025-05-08
Payer: COMMERCIAL

## 2025-05-08 DIAGNOSIS — M25.561 CHRONIC PAIN OF RIGHT KNEE: ICD-10-CM

## 2025-05-08 DIAGNOSIS — R29.898 WEAKNESS OF RIGHT LOWER EXTREMITY: ICD-10-CM

## 2025-05-08 DIAGNOSIS — G89.29 CHRONIC PAIN OF RIGHT KNEE: ICD-10-CM

## 2025-05-08 PROCEDURE — 97140 MANUAL THERAPY 1/> REGIONS: CPT | Mod: GP | Performed by: PHYSICAL THERAPIST

## 2025-05-08 PROCEDURE — 97016 VASOPNEUMATIC DEVICE THERAPY: CPT | Mod: GP | Performed by: PHYSICAL THERAPIST

## 2025-05-08 PROCEDURE — 97110 THERAPEUTIC EXERCISES: CPT | Mod: GP | Performed by: PHYSICAL THERAPIST

## 2025-05-08 ASSESSMENT — PAIN - FUNCTIONAL ASSESSMENT: PAIN_FUNCTIONAL_ASSESSMENT: 0-10

## 2025-05-08 ASSESSMENT — PAIN SCALES - GENERAL: PAINLEVEL_OUTOF10: 8

## 2025-05-08 NOTE — PROGRESS NOTES
"Physical Therapy    Physical Therapy Treatment    Patient Name: Cortney Ruby  MRN: [id number]  Today's Date: 5/8/2025    Time Entry:   Time Calculation  Start Time: 0301  Stop Time: 0343  Time Calculation (min): 42 min     PT Therapeutic Procedures Time Entry  Manual Therapy Time Entry: 15  Therapeutic Exercise Time Entry: 10  PT Modalities Time Entry  Vasopneumatic Devices Time Entry: 10                Assessment:  She's walking into the clinic today with her st cane and mild antalgia.  Increased pain secondary to increased activity noted.  Global tenderness to her Rt knee and calf.  No increased calf warmth.  Negative Mary's.  She's wearing her DIONNE hose.  Held her standing ex's today, performed NuStep, initiated manual therapy to her knee and concluded with ThermX for cold and compression.  Decreased pain noted after PT.  Pt instructed to ice more and decrease her weight bearing activity.          Plan:  Continue skilled PT as needed.      Current Problem  1. Chronic pain of right knee  Follow Up In Physical Therapy      2. Weakness of right lower extremity  Follow Up In Physical Therapy          Subjective  Pt reports increased Rt knee pain today and not a good day.      Pain  Pain Assessment  Pain Assessment: 0-10  0-10 (Numeric) Pain Score: 8  Pain Type: Surgical pain, Chronic pain  Pain Location: Knee  Pain Orientation: Right    Objective     AROM:   Rt knee 0 to 105 degrees of flexion     Passively to 115 degrees of flexion     Treatments:    SLR's, flexion and abduction, 15 reps x 2 *  Heel slides with strap, 3 sec, 30 reps   NuStep, L2, 9 minutes  TR/HR's, 1/2 roll, 20 reps   Standing marching in place, 20 reps  Standing HSC, 20 reps   Squats, 20 reps   4\" forward step ups, 10 reps x 2    Manual therapy to knee, STM, joint mobs, distraction    ThermX, 10 minutes, moderate compression         "

## 2025-05-13 ENCOUNTER — TREATMENT (OUTPATIENT)
Dept: PHYSICAL THERAPY | Facility: HOSPITAL | Age: 65
End: 2025-05-13
Payer: COMMERCIAL

## 2025-05-13 DIAGNOSIS — G89.29 CHRONIC PAIN OF RIGHT KNEE: ICD-10-CM

## 2025-05-13 DIAGNOSIS — R29.898 WEAKNESS OF RIGHT LOWER EXTREMITY: ICD-10-CM

## 2025-05-13 DIAGNOSIS — M25.561 CHRONIC PAIN OF RIGHT KNEE: ICD-10-CM

## 2025-05-13 PROCEDURE — 97110 THERAPEUTIC EXERCISES: CPT | Mod: GP | Performed by: PHYSICAL THERAPIST

## 2025-05-13 ASSESSMENT — PAIN SCALES - GENERAL: PAINLEVEL_OUTOF10: 3

## 2025-05-13 ASSESSMENT — PAIN - FUNCTIONAL ASSESSMENT: PAIN_FUNCTIONAL_ASSESSMENT: 0-10

## 2025-05-13 NOTE — PROGRESS NOTES
"Physical Therapy    Physical Therapy Treatment    Patient Name: Cortney Ruby  MRN: [id number]  Today's Date: 5/13/2025    Time Entry:   Time Calculation  Start Time: 0230  Stop Time: 0313  Time Calculation (min): 43 min     PT Therapeutic Procedures Time Entry  Manual Therapy Time Entry: 5  Therapeutic Exercise Time Entry: 38                   Assessment:  She's walking into the clinic this afternoon with her st cane and minimal antalgia.  She drove to therapy today.  Increased ROM noted today.  She tolerated increased reps and 6\" forward and lateral step ups today.  She's going to ice at home.          Plan:  Continue with skilled PT      Current Problem  1. Chronic pain of right knee  Follow Up In Physical Therapy      2. Weakness of right lower extremity  Follow Up In Physical Therapy          Subjective  Pt states her knee is feeling better today.      Pain  Pain Assessment  Pain Assessment: 0-10  0-10 (Numeric) Pain Score: 3  Pain Type: Surgical pain, Chronic pain  Pain Location: Knee  Pain Orientation: Right    Objective     AROM:   Rt knee 0 to 110 degrees of flexion     Passively to 118 degrees of flexion       Treatments:    SLR's, flexion and abduction, 15 reps x 2 *  Heel slides with strap, 3 sec, 30 reps   NuStep, L2, 9 minutes  TR/HR's, 1/2 roll, 30 reps   Standing marching in place, 20 reps  Standing HSC, 30 reps   Squats, 15 reps x 2  6\" forward step ups, 10 reps x 2     Manual therapy to knee, STM, joint mobs, distraction - 5 minutes     ThermX, 10 minutes, moderate compression - NP       "

## 2025-05-15 ENCOUNTER — TREATMENT (OUTPATIENT)
Dept: PHYSICAL THERAPY | Facility: HOSPITAL | Age: 65
End: 2025-05-15
Payer: COMMERCIAL

## 2025-05-15 DIAGNOSIS — G89.29 CHRONIC PAIN OF RIGHT KNEE: Primary | ICD-10-CM

## 2025-05-15 DIAGNOSIS — R29.898 WEAKNESS OF RIGHT LOWER EXTREMITY: ICD-10-CM

## 2025-05-15 DIAGNOSIS — M25.561 CHRONIC PAIN OF RIGHT KNEE: Primary | ICD-10-CM

## 2025-05-15 DIAGNOSIS — Z96.651 S/P TOTAL KNEE ARTHROPLASTY, RIGHT: ICD-10-CM

## 2025-05-15 PROCEDURE — 97110 THERAPEUTIC EXERCISES: CPT | Mod: GP,CQ

## 2025-05-15 ASSESSMENT — PAIN SCALES - GENERAL: PAINLEVEL_OUTOF10: 2

## 2025-05-15 ASSESSMENT — PAIN - FUNCTIONAL ASSESSMENT: PAIN_FUNCTIONAL_ASSESSMENT: 0-10

## 2025-05-15 NOTE — PROGRESS NOTES
Physical Therapy Treatment  5/15/2025  Patient Name: Cortney Ruby  MRN: [id number]  Current Problem  1. Chronic pain of right knee  Follow Up In Physical Therapy      2. Weakness of right lower extremity  Follow Up In Physical Therapy      3. S/P total knee arthroplasty, right          Insurance   [facility] Dual Complete  Visit 5  Subjective   Pt still with some discomfort, but improving slowly.  Precautions  Precautions Comment: none  Pain Assessment: 0-10  0-10 (Numeric) Pain Score: 2  Pain Type: Surgical pain  Pain Location: Knee  Pain Orientation: Right  Objective  Pt walking with cane and minimal antalgia. ROM 0 to 100 degrees actively, 123 degrees passively  Treatments  NuStep, L4, 10 minutes  Squats, 15 reps x 2  HR/TR, 1/2 roll, 30 reps  Standing hip 3-way, RTB, 20 reps  Standing HSC, 3 lbs, 15 reps x 2  Forward step ups, 6 in, 20 reps  Reverse step ups, 4 in, 20 reps    Assessment  Pt is just over 5 weeks out from R TKR. She is doing extremely well to this point. Strength improving with good quad control. Motion well beyond functional and continues to improve. Still with some discomfort eccentric quad loading, but otherwise no issues today. Reviewed HEP.  Plan:  [1] Knee ROM   [2] Quad and glute strengthening  [3]  [4]    OP EDUCATION:    Goals:       Time Calculation  Start Time: 0155  Stop Time: 0235  Time Calculation (min): 40 min  PT Therapeutic Procedures Time Entry  Therapeutic Exercise Time Entry: 38

## 2025-05-19 ENCOUNTER — APPOINTMENT (OUTPATIENT)
Dept: PHARMACY | Facility: HOSPITAL | Age: 65
End: 2025-05-19
Payer: COMMERCIAL

## 2025-05-19 DIAGNOSIS — I10 ESSENTIAL HYPERTENSION: ICD-10-CM

## 2025-05-19 DIAGNOSIS — Z23 NEED FOR VACCINATION: ICD-10-CM

## 2025-05-19 DIAGNOSIS — E66.01 MORBID OBESITY (MULTI): ICD-10-CM

## 2025-05-19 DIAGNOSIS — Z00.00 ENCOUNTER FOR MEDICARE ANNUAL WELLNESS EXAM: ICD-10-CM

## 2025-05-19 DIAGNOSIS — E78.2 MIXED HYPERLIPIDEMIA: ICD-10-CM

## 2025-05-19 DIAGNOSIS — Z79.4 TYPE 2 DIABETES MELLITUS WITHOUT COMPLICATION, WITH LONG-TERM CURRENT USE OF INSULIN: ICD-10-CM

## 2025-05-19 DIAGNOSIS — Z79.4 TYPE 2 DIABETES MELLITUS WITH HYPERGLYCEMIA, WITH LONG-TERM CURRENT USE OF INSULIN: ICD-10-CM

## 2025-05-19 DIAGNOSIS — E11.9 TYPE 2 DIABETES MELLITUS WITHOUT COMPLICATION, WITH LONG-TERM CURRENT USE OF INSULIN: ICD-10-CM

## 2025-05-19 DIAGNOSIS — J44.9 CHRONIC OBSTRUCTIVE PULMONARY DISEASE, UNSPECIFIED COPD TYPE (MULTI): ICD-10-CM

## 2025-05-19 DIAGNOSIS — E11.65 TYPE 2 DIABETES MELLITUS WITH HYPERGLYCEMIA, WITH LONG-TERM CURRENT USE OF INSULIN: ICD-10-CM

## 2025-05-19 RX ORDER — SEMAGLUTIDE 1.34 MG/ML
1 INJECTION, SOLUTION SUBCUTANEOUS WEEKLY
Qty: 3 ML | Refills: 1 | Status: SHIPPED | OUTPATIENT
Start: 2025-05-19

## 2025-05-19 NOTE — ASSESSMENT & PLAN NOTE
Patient's goal A1c is < 7%.  Is pt at goal? Yes, 6% on 3/26/25  Patient's SMBGs are at goal; reported 119-120s  Rationale for plan: Will plan to continue with current therapy for now due to patients controlled BG and re-assess at next visit. Advised to make sure to eat throughout the day to prevent lows, especially as patient is taking insulin.      Medication Changes: None    CONTINUE:  Humalog Mix 75/25: 30 units subcutaneously BID  Ozempic 1 mg weekly     Future Considerations:  Titrate GLP1  Start SGLT2i  Consider ACEi for renoprotective properties  Decrease insulin dosage     Monitoring and Education:  Ozempic Education:   Counseled patient on Ozempic MOA, expectations, side effects, duration of therapy, administration, and monitoring parameters.  Provided detailed dosing and administration counseling to ensure proper technique.   Reviewed Ozempic titration schedule, starting with 0.25 mg once weekly for 4 weeks, then continuing on 0.5 mg once weekly. Pt verbalized understanding.  Counseled patient on the benefits of GLP-1ra, such as cardiovascular risk reduction, glycemic control, and weight loss potential.  Reviewed storage requirements of Ozempic when not in use, and when to administer the medication if a dose is missed.  Advised patient that they may experience improved satiety after meals and portion sizes of meals may be reduced as doses of Ozempic increase.  Counseled patient to avoid foods that are fatty/oily as this may precipitate the nausea/GI upset that may occur with new start Ozempic.

## 2025-05-19 NOTE — PROGRESS NOTES
Clinical Pharmacy Appointment    Patient ID: Cortney Ruby is a 64 y.o. female who presents for Diabetes.    Referring Provider: Mayra Lewis MD     Pt is here for Follow Up appointment.      Referring Provider: Mayra Lewis  PCP: Mayra Lewis MD   Last visit with PCP: 3/18/25  Next visit with PCP: 25    Endocrinology: Kyle Donnie @ [facility]  Last visit: 24    Subjective     Interval History:  Had knee replacement  PT --> seeing 2x/week  DM  Increase to Ozempic 1 mg per week  SE: denies  Significant appetite suppression; reduced portions  Weight loss fluctuating  SMBGs now within goal (reported 119-120 range); applauded patient for continued efforts  Notes a few low readings d/t not eating    Glucose Monitoring Regimen:  Patient is using glucometer; testing daily      Reported SMBG Measurements:  FB-120s    Any episodes of hypoglycemia? yes - few episodes d/t not eating    Adverse Effects:   Patient denies any GI adverse effects (Upset stomach/diarrhea/constipation/nausea/vomiting)    Adherence: Excellent  Affordability/Accessibility  No issues with cost of medications at this time.     Diabetes Pharmacotherapy:    Humalog Mix 75/25: 30 units BID  Ozempic 1 mg once weekly on     Historical Diabetes Pharmacotherapy:  None    Secondary Prevention  Statin? Yes (Atorvastatin 40 mg)  ACE-I/ARB? No  Aspirin? No    Pertinent PMH Review  PMH of Pancreatitis: No  PMH of Retinopathy: No  PMH of Recurrent Urinary Tract Infections: No  PMH of Medullary Thyroid Cancer (MTC): No  PMH of Multiple Endocrine Neoplasia (MEN) Type II: No    Health Maintenance:   Foot Exam: unknown  Eye Exam: unknown  Lipid Panel: LDL 52 mg/dL   UACR: 11.3 (2024)  Influenza Vaccine: not up to date  Pneumonia Vaccine: PCV20 24    Drug Interactions: No significant drug-drug interactions exist that require change in therapy.    Objective     There were no vitals taken for this visit.      Labs  Lab Results   Component Value Date    BILITOT 0.7 03/26/2025    CALCIUM 8.6 04/10/2025    CO2 23 04/10/2025     04/10/2025    CREATININE 0.54 04/10/2025    GLUCOSE 180 (H) 04/10/2025    ALKPHOS 83 03/26/2025    K 4.0 04/10/2025    PROT 6.5 03/26/2025     04/10/2025    AST 18 03/26/2025    ALT 25 03/26/2025    BUN 15 04/10/2025    ANIONGAP 11 04/10/2025    MG 1.91 03/26/2025    ALBUMIN 4.1 03/26/2025    GFRF >90 05/08/2023     Lab Results   Component Value Date    TRIG 206 (H) 03/26/2025    CHOL 144 03/26/2025    LDLCALC 52 03/26/2025    HDL 51.1 03/26/2025     Lab Results   Component Value Date    HGBA1C 6.0 (H) 03/26/2025       Medications Ordered Prior to Encounter[1]     Assessment/Plan   Problem List Items Addressed This Visit           ICD-10-CM    Essential hypertension I10    Mixed hyperlipidemia E78.2    Morbid obesity (Multi) E66.01    Type 2 diabetes mellitus with hyperglycemia, with long-term current use of insulin E11.65, Z79.4    Patient's goal A1c is < 7%.  Is pt at goal? Yes, 6% on 3/26/25  Patient's SMBGs are at goal; reported 119-120s  Rationale for plan: Will plan to continue with current therapy for now due to patients controlled BG and re-assess at next visit. Advised to make sure to eat throughout the day to prevent lows, especially as patient is taking insulin.      Medication Changes: None    CONTINUE:  Humalog Mix 75/25: 30 units subcutaneously BID  Ozempic 1 mg weekly     Future Considerations:  Titrate GLP1  Start SGLT2i  Consider ACEi for renoprotective properties  Decrease insulin dosage     Monitoring and Education:  Ozempic Education:   Counseled patient on Ozempic MOA, expectations, side effects, duration of therapy, administration, and monitoring parameters.  Provided detailed dosing and administration counseling to ensure proper technique.   Reviewed Ozempic titration schedule, starting with 0.25 mg once weekly for 4 weeks, then continuing on 0.5 mg once weekly.  "Pt verbalized understanding.  Counseled patient on the benefits of GLP-1ra, such as cardiovascular risk reduction, glycemic control, and weight loss potential.  Reviewed storage requirements of Ozempic when not in use, and when to administer the medication if a dose is missed.  Advised patient that they may experience improved satiety after meals and portion sizes of meals may be reduced as doses of Ozempic increase.  Counseled patient to avoid foods that are fatty/oily as this may precipitate the nausea/GI upset that may occur with new start Ozempic.          Chronic obstructive pulmonary disease (Multi) J44.9     Other Visit Diagnoses         Codes      Type 2 diabetes mellitus without complication, with long-term current use of insulin     E11.9, Z79.4      Encounter for Medicare annual wellness exam     Z00.00      Need for vaccination     Z23          Minerva Hossain, PharmD  Clinical Ambulatory Care Pharmacist  Ph: [phone]    Continue all meds under the continuation of care with the referring provider and clinical pharmacy team.    Clinical Pharmacist follow up: 7/14/25 or sooner as needed based on clinical intervention  Type of Encounter:  Telephone    Verbal consent to manage patient's drug therapy was obtained from the patient. They were informed they may decline to participate or withdraw from participation in pharmacy services at any time.       [1]   Current Outpatient Medications on File Prior to Visit   Medication Sig Dispense Refill    amLODIPine (Norvasc) 2.5 mg tablet Take 1 tablet (2.5 mg) by mouth once daily. 90 tablet 3    atorvastatin (Lipitor) 40 mg tablet Take 1 tablet (40 mg) by mouth once daily. 90 tablet 3    BD Ultra-Fine Micro Pen Needle 32 gauge x 1/4\" needle USE TWICE DAILY      Combivent Respimat  mcg/actuation inhaler Inhale 1 puff 4 times a day. 12 g 3    cyclobenzaprine (Flexeril) 10 mg tablet Take 1 tablet (10 mg) by mouth 3 times a day as needed for muscle spasms for up " to 7 days. 21 tablet 0    fluticasone-umeclidin-vilanter (Trelegy Ellipta) 100-62.5-25 mcg blister with device Inhale 1 puff once daily. 60 each 3    ibuprofen 800 mg tablet Take 1 tablet (800 mg) by mouth every 8 hours if needed (pain). 270 tablet 0    naloxone (Narcan) 4 mg/0.1 mL nasal spray Instill 1 spray intranasally for opioid overdose; repeat in 5 minutes if no response. (Patient not taking: Reported on 4/9/2025) 2 each 0    NovoLOG Mix 70-30FlexPen U-100 100 unit/mL (70-30) pen ADMINISTER 40 UNITS UNDER THE SKIN TWICE DAILY      OneTouch Delica Plus Lancet 33 gauge misc USE DAILY      OneTouch Ultra Test strip TEST BID AND PRN      semaglutide (Ozempic) 1 mg/dose (4 mg/3 mL) pen injector Inject 1 mg under the skin 1 (one) time per week. 3 mL 1     No current facility-administered medications on file prior to visit.

## 2025-05-20 ENCOUNTER — TREATMENT (OUTPATIENT)
Dept: PHYSICAL THERAPY | Facility: HOSPITAL | Age: 65
End: 2025-05-20
Payer: COMMERCIAL

## 2025-05-20 DIAGNOSIS — R29.898 WEAKNESS OF RIGHT LOWER EXTREMITY: ICD-10-CM

## 2025-05-20 DIAGNOSIS — G89.29 CHRONIC PAIN OF RIGHT KNEE: Primary | ICD-10-CM

## 2025-05-20 DIAGNOSIS — M25.561 CHRONIC PAIN OF RIGHT KNEE: Primary | ICD-10-CM

## 2025-05-20 PROCEDURE — 97110 THERAPEUTIC EXERCISES: CPT | Mod: GP | Performed by: PHYSICAL THERAPIST

## 2025-05-20 ASSESSMENT — PAIN SCALES - GENERAL: PAINLEVEL_OUTOF10: 6

## 2025-05-20 ASSESSMENT — PAIN - FUNCTIONAL ASSESSMENT: PAIN_FUNCTIONAL_ASSESSMENT: 0-10

## 2025-05-20 NOTE — PROGRESS NOTES
"Physical Therapy    Physical Therapy Treatment    Patient Name: Cortney Ruby  MRN: [id number]  Today's Date: 5/20/2025    Time Entry:   Time Calculation  Start Time: 1245  Stop Time: 1330  Time Calculation (min): 45 min     PT Therapeutic Procedures Time Entry  Manual Therapy Time Entry: 5  Therapeutic Exercise Time Entry: 40                     Assessment:  She's walking into the clinic with her st cane and minimal antalgia.  Increased ROM noted and her strength and endurance are slowly progressing.  Initiated 6\" step overs which were challenging for her today.  She's going to ice and home and take it easy the rest of today and tomorrow.      Plan:  Continue with skilled PT       Current Problem  1. Chronic pain of right knee  Follow Up In Physical Therapy      2. Weakness of right lower extremity  Follow Up In Physical Therapy          Subjective  Pt states she's tired and full from just eating a big lunch.      Pain  Pain Assessment  Pain Assessment: 0-10  0-10 (Numeric) Pain Score: 6  Pain Type: Surgical pain, Chronic pain  Pain Location: Knee  Pain Orientation: Right    Objective     Rt knee AROM: 0 to 110 degrees of flexion and to 115 degrees of flexion passively.      Rt knee 4+-5/5 strength with resistance, MMT      Treatments:    NuStep, L4, 10 minutes  Squats, 15 reps x 2  HR/TR, 1/2 roll, 30 reps  Standing hip 3-way, RTB, 15 reps x 2  Standing HSC, 3 lbs, 15 reps x 2  Forward step ups, 6 in, 20 reps  Reverse step ups, 4 in, 20 reps  6\" step overs, 10 reps        "

## 2025-05-22 ENCOUNTER — APPOINTMENT (OUTPATIENT)
Dept: PHYSICAL THERAPY | Facility: HOSPITAL | Age: 65
End: 2025-05-22
Payer: COMMERCIAL

## 2025-05-22 ENCOUNTER — HOSPITAL ENCOUNTER (EMERGENCY)
Facility: HOSPITAL | Age: 65
Discharge: HOME | End: 2025-05-22
Payer: COMMERCIAL

## 2025-05-22 VITALS
BODY MASS INDEX: 34.66 KG/M2 | HEART RATE: 80 BPM | RESPIRATION RATE: 18 BRPM | OXYGEN SATURATION: 100 % | SYSTOLIC BLOOD PRESSURE: 150 MMHG | TEMPERATURE: 97.7 F | HEIGHT: 65 IN | WEIGHT: 208 LBS | DIASTOLIC BLOOD PRESSURE: 76 MMHG

## 2025-05-22 DIAGNOSIS — L50.9 URTICARIA: Primary | ICD-10-CM

## 2025-05-22 PROCEDURE — 2500000004 HC RX 250 GENERAL PHARMACY W/ HCPCS (ALT 636 FOR OP/ED): Performed by: PHYSICIAN ASSISTANT

## 2025-05-22 PROCEDURE — 2500000001 HC RX 250 WO HCPCS SELF ADMINISTERED DRUGS (ALT 637 FOR MEDICARE OP): Performed by: PHYSICIAN ASSISTANT

## 2025-05-22 PROCEDURE — 99282 EMERGENCY DEPT VISIT SF MDM: CPT

## 2025-05-22 PROCEDURE — 99283 EMERGENCY DEPT VISIT LOW MDM: CPT | Performed by: PHYSICIAN ASSISTANT

## 2025-05-22 RX ORDER — DIPHENHYDRAMINE HCL 25 MG
50 TABLET ORAL ONCE
Status: COMPLETED | OUTPATIENT
Start: 2025-05-22 | End: 2025-05-22

## 2025-05-22 RX ORDER — PREDNISONE 20 MG/1
40 TABLET ORAL ONCE
Status: COMPLETED | OUTPATIENT
Start: 2025-05-22 | End: 2025-05-22

## 2025-05-22 RX ORDER — PREDNISONE 20 MG/1
40 TABLET ORAL DAILY
Qty: 10 TABLET | Refills: 0 | Status: SHIPPED | OUTPATIENT
Start: 2025-05-22 | End: 2025-05-27

## 2025-05-22 RX ADMIN — PREDNISONE 40 MG: 20 TABLET ORAL at 13:26

## 2025-05-22 RX ADMIN — DIPHENHYDRAMINE HCL 50 MG: 25 TABLET, COATED ORAL at 13:27

## 2025-05-22 ASSESSMENT — COLUMBIA-SUICIDE SEVERITY RATING SCALE - C-SSRS
2. HAVE YOU ACTUALLY HAD ANY THOUGHTS OF KILLING YOURSELF?: NO
6. HAVE YOU EVER DONE ANYTHING, STARTED TO DO ANYTHING, OR PREPARED TO DO ANYTHING TO END YOUR LIFE?: NO
1. IN THE PAST MONTH, HAVE YOU WISHED YOU WERE DEAD OR WISHED YOU COULD GO TO SLEEP AND NOT WAKE UP?: NO

## 2025-05-22 ASSESSMENT — LIFESTYLE VARIABLES
TOTAL SCORE: 0
HAVE YOU EVER FELT YOU SHOULD CUT DOWN ON YOUR DRINKING: NO
TOTAL SCORE: 0
HAVE YOU EVER FELT YOU SHOULD CUT DOWN ON YOUR DRINKING: NO
EVER HAD A DRINK FIRST THING IN THE MORNING TO STEADY YOUR NERVES TO GET RID OF A HANGOVER: NO
HAVE PEOPLE ANNOYED YOU BY CRITICIZING YOUR DRINKING: NO
EVER FELT BAD OR GUILTY ABOUT YOUR DRINKING: NO
EVER FELT BAD OR GUILTY ABOUT YOUR DRINKING: NO
EVER HAD A DRINK FIRST THING IN THE MORNING TO STEADY YOUR NERVES TO GET RID OF A HANGOVER: NO
HAVE PEOPLE ANNOYED YOU BY CRITICIZING YOUR DRINKING: NO

## 2025-05-22 ASSESSMENT — PAIN SCALES - GENERAL: PAINLEVEL_OUTOF10: 0 - NO PAIN

## 2025-05-22 ASSESSMENT — PAIN - FUNCTIONAL ASSESSMENT: PAIN_FUNCTIONAL_ASSESSMENT: 0-10

## 2025-05-22 NOTE — ED PROVIDER NOTES
HPI   Chief Complaint   Patient presents with    Rash     Systemic rash       This is a 64-year-old female presenting for hives since yesterday.  Primarily arms and legs. Denies tongue swelling, difficulty swallowing, difficulty breathing, shortness of breath. Denies history of dermatologic conditions. Denies fevers, chills. Denies changes in detergents or lotions or soaps, or environmental exposures. Denies dietary or medication changes.       History provided by:  Patient   used: No            Patient History   Medical History[1]  Surgical History[2]  Family History[3]  Social History[4]    Physical Exam   ED Triage Vitals [05/22/25 1304]   Temperature Heart Rate Respirations BP   36.5 °C (97.7 °F) 88 20 159/80      Pulse Ox Temp Source Heart Rate Source Patient Position   100 % Temporal Monitor Sitting      BP Location FiO2 (%)     Right arm --       Physical Exam    General: Vitals noted, no distress. Afebrile  EENT: Posterior oropharynx patent and unremarkable. No tongue swelling. No oral lesions or vesicles. Eyes unremarkable.  Neck: Supple. No meningismus through full range of motion. No lymphadenopathy.  Cardiac: Regular rate and rhythm. No murmur.  Pulmonary: Lungs clear bilaterally with good aeration. No adventitious breath sounds.   Abdomen: Soft, Nontender. .  Skin: Hives present    ED Course & MDM   Diagnoses as of 05/22/25 1453   Urticaria                 No data recorded     Alexis Coma Scale Score: 15 (05/22/25 1306 : Mayra Juarez RN)                           Medical Decision Making  Will treat with Benadryl and prednisone was advised to use watch her sugars carefully while on the prednisone.  Instructed to return to the nearest ED if any concerns or new or worsening symptoms. Patient verbalized understanding and agreement with plan. Discharged in stable condition.      Disclaimer: This note was dictated using speech recognition software. An attempt at proofreading was  made to minimize errors. Minor errors in transcription may be present.s        Procedure  Procedures       [1]   Past Medical History:  Diagnosis Date    Arthritis     Cholelithiasis     COPD (chronic obstructive pulmonary disease) (Multi)     Depression     Diabetes mellitus (Multi)     GERD (gastroesophageal reflux disease)     History of mammogram 2016    cat 2    Hyperlipidemia     Hypertension     Internal derangement of knee     Joint pain     Mammogram declined     Pap smear of cervix declined     Pap test, as part of routine gynecological examination 2015    wnl, hpv not done    Peripheral vascular disease of foot     Respiratory failure     Snores     Type 2 diabetes mellitus     Use of cane as ambulatory aid     Vertigo     Wears glasses    [2]   Past Surgical History:  Procedure Laterality Date    CARPAL TUNNEL RELEASE Left     CERVICAL FUSION       SECTION, CLASSIC  1977    ,      SECTION, LOW TRANSVERSE      COLONOSCOPY  2018    adenoma-due     CYST REMOVAL      Left Cheek & Right [place]    CYSTOSCOPY      Dr. Sanchez-unsure date    LUMBAR DISCECTOMY      Dr. Clay    SPINE SURGERY      US ASPIRATION INJECTION INTERMEDIATE JOINT  2021    US ASPIRATION INJECTION INTERMEDIATE JOINT 2021 [facility]    XR CHEST PACEMAKER WITH FLUORO  2023    XR CHEST PACEMAKER WITH FLUORO 2023 [facility]   [3]   Family History  Problem Relation Name Age of Onset    Diabetes Mother      Lung cancer Mother      Diabetes Sister      Kidney cancer Sister     [4]   Social History  Tobacco Use    Smoking status: Every Day     Current packs/day: 0.50     Average packs/day: 0.5 packs/day for 20.0 years (10.0 ttl pk-yrs)     Types: Cigarettes    Smokeless tobacco: Never   Vaping Use    Vaping status: Never Used   Substance Use Topics    Alcohol use: Never    Drug use: Never        Jayro Wolfe PA-C  25 1451

## 2025-05-27 ENCOUNTER — TREATMENT (OUTPATIENT)
Dept: PHYSICAL THERAPY | Facility: HOSPITAL | Age: 65
End: 2025-05-27
Payer: COMMERCIAL

## 2025-05-27 DIAGNOSIS — G89.29 CHRONIC PAIN OF RIGHT KNEE: ICD-10-CM

## 2025-05-27 DIAGNOSIS — M25.561 CHRONIC PAIN OF RIGHT KNEE: ICD-10-CM

## 2025-05-27 DIAGNOSIS — R29.898 WEAKNESS OF RIGHT LOWER EXTREMITY: ICD-10-CM

## 2025-05-27 PROCEDURE — 97110 THERAPEUTIC EXERCISES: CPT | Mod: GP | Performed by: PHYSICAL THERAPIST

## 2025-05-27 ASSESSMENT — PAIN SCALES - GENERAL: PAINLEVEL_OUTOF10: 0 - NO PAIN

## 2025-05-27 ASSESSMENT — PAIN - FUNCTIONAL ASSESSMENT: PAIN_FUNCTIONAL_ASSESSMENT: 0-10

## 2025-05-27 NOTE — PROGRESS NOTES
"Physical Therapy    Physical Therapy Treatment    Patient Name: Cortney Ruby  MRN: [id number]  Today's Date: 5/27/2025    Time Entry:   Time Calculation  Start Time: 0215  Stop Time: 0258  Time Calculation (min): 43 min     PT Therapeutic Procedures Time Entry  Manual Therapy Time Entry: 5  Therapeutic Exercise Time Entry: 38                   Assessment:  She's walking into the clinic this afternoon with her st cane and minimal antalgia.  Her AROM is better and decreased tenderness globally around her knee.  Some decreased eccentric quadriceps control noted in some difficulty with her 6\" step overs.      Plan:  Continue with skilled PT    Current Problem  1. Chronic pain of right knee  Follow Up In Physical Therapy      2. Weakness of right lower extremity  Follow Up In Physical Therapy          Subjective  Pt states her knee is feeling great secondary to doing nothing over the weekend secondary to getting hives from something and just finishing up her oral steroid.    Pain  Pain Assessment  Pain Assessment: 0-10  0-10 (Numeric) Pain Score: 0 - No pain    Objective     Rt knee AROM: 0 to 120 degrees of flexion.     Rt knee 4+-5/5 strength with resistance, MMT         Treatments:    NuStep, L4, 10 minutes  Squats, 15 reps x 2  HR/TR, 1/2 roll, 30 reps  Standing hip 3-way, RTB, 15 reps x 2  Standing HSC, 3 lbs, 15 reps x 2  Forward step ups, 6 in, 20 reps  Reverse step ups, 4 in, 20 reps  6\" step overs, 10 reps x 2    STM/patellar/joint mobs       "

## 2025-05-29 ENCOUNTER — TREATMENT (OUTPATIENT)
Dept: PHYSICAL THERAPY | Facility: HOSPITAL | Age: 65
End: 2025-05-29
Payer: COMMERCIAL

## 2025-05-29 DIAGNOSIS — Z79.4 TYPE 2 DIABETES MELLITUS WITH OTHER SPECIFIED COMPLICATION, WITH LONG-TERM CURRENT USE OF INSULIN (HCC): ICD-10-CM

## 2025-05-29 DIAGNOSIS — G89.29 CHRONIC PAIN OF RIGHT KNEE: ICD-10-CM

## 2025-05-29 DIAGNOSIS — M25.561 CHRONIC PAIN OF RIGHT KNEE: ICD-10-CM

## 2025-05-29 DIAGNOSIS — R29.898 WEAKNESS OF RIGHT LOWER EXTREMITY: ICD-10-CM

## 2025-05-29 DIAGNOSIS — E11.69 TYPE 2 DIABETES MELLITUS WITH OTHER SPECIFIED COMPLICATION, WITH LONG-TERM CURRENT USE OF INSULIN (HCC): ICD-10-CM

## 2025-05-29 LAB
ANION GAP SERPL CALCULATED.3IONS-SCNC: 12 MEQ/L (ref 9–15)
BUN SERPL-MCNC: 13 MG/DL (ref 8–23)
CALCIUM SERPL-MCNC: 8.9 MG/DL (ref 8.5–9.9)
CHLORIDE SERPL-SCNC: 103 MEQ/L (ref 95–107)
CO2 SERPL-SCNC: 23 MEQ/L (ref 20–31)
CREAT SERPL-MCNC: 0.53 MG/DL (ref 0.5–0.9)
CREAT UR-MCNC: 286.7 MG/DL
ESTIMATED AVERAGE GLUCOSE: 128 MG/DL
GLUCOSE SERPL-MCNC: 121 MG/DL (ref 70–99)
HBA1C MFR BLD: 6.1 % (ref 4–6)
MICROALBUMIN UR-MCNC: <1.2 MG/DL
MICROALBUMIN/CREAT UR-RTO: NORMAL MG/G (ref 0–30)
POTASSIUM SERPL-SCNC: 4 MEQ/L (ref 3.4–4.9)
SODIUM SERPL-SCNC: 138 MEQ/L (ref 135–144)

## 2025-05-29 PROCEDURE — 97110 THERAPEUTIC EXERCISES: CPT | Mod: GP | Performed by: PHYSICAL THERAPIST

## 2025-05-29 ASSESSMENT — PAIN - FUNCTIONAL ASSESSMENT: PAIN_FUNCTIONAL_ASSESSMENT: 0-10

## 2025-05-29 ASSESSMENT — PAIN SCALES - GENERAL: PAINLEVEL_OUTOF10: 5 - MODERATE PAIN

## 2025-05-29 NOTE — PROGRESS NOTES
"Physical Therapy    Physical Therapy Treatment    Patient Name: Cortney Ruby  MRN: [id number]  Today's Date: 5/29/2025    Time Entry:   Time Calculation  Start Time: 0305  Stop Time: 0330  Time Calculation (min): 25 min     PT Therapeutic Procedures Time Entry  Therapeutic Exercise Time Entry: 25                   Assessment:  Pt walking into the clinic this afternoon with her st cane and minimal antalgia.  Fair quadriceps control with walking down stairs reciprocally.  No change in her ROM or strength.  No increased pain after treatment.        Plan:  Plan to see for 2 more PT treatments.      Current Problem  1. Chronic pain of right knee  Follow Up In Physical Therapy      2. Weakness of right lower extremity  Follow Up In Physical Therapy          Subjective  Pt states her Rt knee is a little sorer today.      Pain  Pain Assessment  Pain Assessment: 0-10  0-10 (Numeric) Pain Score: 5 - Moderate pain  Pain Type: Surgical pain, Chronic pain  Pain Location: Knee  Pain Orientation: Right    Objective     Rt knee AROM: 0 to 120 degrees of flexion.     Rt knee 4+-5/5 strength with resistance, MMT    Treatments:    NuStep, L4, 10 minutes  Squats, 15 reps x 2  HR/TR, 1/2 roll, 30 reps  Standing hip 3-way, RTB, 15 reps x 2  Standing HSC, 3 lbs, 15 reps x 2  Forward step ups, 6 in, 20 reps  Reverse step ups, 4 in, 20 reps  6\" step overs, 10 reps x 2  Rt SLS with 1 UE assist, 5-10 second hold, 10 reps      STM/patellar/joint mobs         "

## 2025-06-01 PROCEDURE — RXMED WILLOW AMBULATORY MEDICATION CHARGE

## 2025-06-02 ENCOUNTER — OFFICE VISIT (OUTPATIENT)
Age: 65
End: 2025-06-02
Payer: MEDICARE

## 2025-06-02 VITALS
HEART RATE: 79 BPM | DIASTOLIC BLOOD PRESSURE: 70 MMHG | HEIGHT: 64 IN | BODY MASS INDEX: 35.38 KG/M2 | OXYGEN SATURATION: 96 % | SYSTOLIC BLOOD PRESSURE: 109 MMHG | WEIGHT: 207.23 LBS

## 2025-06-02 DIAGNOSIS — Z79.4 TYPE 2 DIABETES MELLITUS WITH OTHER SPECIFIED COMPLICATION, WITH LONG-TERM CURRENT USE OF INSULIN (HCC): Primary | ICD-10-CM

## 2025-06-02 DIAGNOSIS — E11.69 TYPE 2 DIABETES MELLITUS WITH OTHER SPECIFIED COMPLICATION, WITH LONG-TERM CURRENT USE OF INSULIN (HCC): Primary | ICD-10-CM

## 2025-06-02 LAB
CHP ED QC CHECK: NORMAL
GLUCOSE BLD-MCNC: 98 MG/DL

## 2025-06-02 PROCEDURE — 4004F PT TOBACCO SCREEN RCVD TLK: CPT | Performed by: INTERNAL MEDICINE

## 2025-06-02 PROCEDURE — 1090F PRES/ABSN URINE INCON ASSESS: CPT | Performed by: INTERNAL MEDICINE

## 2025-06-02 PROCEDURE — G8400 PT W/DXA NO RESULTS DOC: HCPCS | Performed by: INTERNAL MEDICINE

## 2025-06-02 PROCEDURE — G8427 DOCREV CUR MEDS BY ELIG CLIN: HCPCS | Performed by: INTERNAL MEDICINE

## 2025-06-02 PROCEDURE — 2022F DILAT RTA XM EVC RTNOPTHY: CPT | Performed by: INTERNAL MEDICINE

## 2025-06-02 PROCEDURE — 3017F COLORECTAL CA SCREEN DOC REV: CPT | Performed by: INTERNAL MEDICINE

## 2025-06-02 PROCEDURE — G8417 CALC BMI ABV UP PARAM F/U: HCPCS | Performed by: INTERNAL MEDICINE

## 2025-06-02 PROCEDURE — 82962 GLUCOSE BLOOD TEST: CPT | Performed by: INTERNAL MEDICINE

## 2025-06-02 PROCEDURE — 1126F AMNT PAIN NOTED NONE PRSNT: CPT | Performed by: INTERNAL MEDICINE

## 2025-06-02 PROCEDURE — 99213 OFFICE O/P EST LOW 20 MIN: CPT | Performed by: INTERNAL MEDICINE

## 2025-06-02 PROCEDURE — 1123F ACP DISCUSS/DSCN MKR DOCD: CPT | Performed by: INTERNAL MEDICINE

## 2025-06-02 PROCEDURE — 3074F SYST BP LT 130 MM HG: CPT | Performed by: INTERNAL MEDICINE

## 2025-06-02 PROCEDURE — 3078F DIAST BP <80 MM HG: CPT | Performed by: INTERNAL MEDICINE

## 2025-06-02 PROCEDURE — 3044F HG A1C LEVEL LT 7.0%: CPT | Performed by: INTERNAL MEDICINE

## 2025-06-02 PROCEDURE — 1159F MED LIST DOCD IN RCRD: CPT | Performed by: INTERNAL MEDICINE

## 2025-06-02 RX ORDER — SEMAGLUTIDE 1.34 MG/ML
1 INJECTION, SOLUTION SUBCUTANEOUS WEEKLY
COMMUNITY
Start: 2025-05-19

## 2025-06-02 NOTE — PROGRESS NOTES
6/2/2025    Assessment:       Diagnosis Orders   1. Type 2 diabetes mellitus with other specified complication, with long-term current use of insulin ([facility])  POCT Glucose            PLAN:     Continue current dose of NovoLog 70/30 30 units twice daily plus Ozempic 1 mg once a week  Orders Placed This Encounter   Procedures    Hemoglobin A1C     Standing Status:   Future     Expected Date:   6/2/2025     Expiration Date:   6/2/2026    Basic Metabolic Panel     Standing Status:   Future     Expected Date:   6/2/2025     Expiration Date:   6/2/2026    POCT Glucose         Subjective:     Chief Complaint   Patient presents with    Diabetes    Obesity     Lost 5lbs since 12-2-24     Vitals:    06/02/25 1322   BP: 109/70   Pulse: 79   SpO2: 96%   Weight: 94 kg (207 lb 3.7 oz)   Height: 1.626 m (5' 4\")     Wt Readings from Last 3 Encounters:   06/02/25 94 kg (207 lb 3.7 oz)   12/02/24 96.2 kg (212 lb)   05/30/24 102.5 kg (226 lb)     BP Readings from Last 3 Encounters:   06/02/25 109/70   12/02/24 117/72   05/30/24 113/71     Follow-up on type 2 diabetes obesity on NovoLog 70/30 30 units twice daily plus Ozempic 1 mg once a week hemoglobin A1c was 6.1 average glucose close to 130 denies any hypoglycemia    Hemoglobin A1C       Date                     Value               Ref Range           Status                05/29/2025               6.1 (H)             4.0 - 6.0 %         Final            ----------      Diabetes  She presents for her follow-up diabetic visit. She has type 2 diabetes mellitus. Symptoms are stable. Risk factors for coronary artery disease include obesity. Current diabetic treatment includes insulin injections. She is currently taking insulin pre-breakfast and pre-dinner. She is following a generally healthy diet. Her overall blood glucose range is 110-130 mg/dl. An ACE inhibitor/angiotensin II receptor blocker is being taken.     Past Medical History:   Diagnosis Date    Benign neoplasm of colon

## 2025-06-03 ENCOUNTER — TREATMENT (OUTPATIENT)
Dept: PHYSICAL THERAPY | Facility: HOSPITAL | Age: 65
End: 2025-06-03
Payer: COMMERCIAL

## 2025-06-03 DIAGNOSIS — G89.29 CHRONIC PAIN OF RIGHT KNEE: ICD-10-CM

## 2025-06-03 DIAGNOSIS — M25.561 CHRONIC PAIN OF RIGHT KNEE: ICD-10-CM

## 2025-06-03 DIAGNOSIS — R29.898 WEAKNESS OF RIGHT LOWER EXTREMITY: ICD-10-CM

## 2025-06-03 PROCEDURE — 97110 THERAPEUTIC EXERCISES: CPT | Mod: GP | Performed by: PHYSICAL THERAPIST

## 2025-06-03 ASSESSMENT — PAIN SCALES - GENERAL: PAINLEVEL_OUTOF10: 3

## 2025-06-03 ASSESSMENT — PAIN - FUNCTIONAL ASSESSMENT: PAIN_FUNCTIONAL_ASSESSMENT: 0-10

## 2025-06-03 NOTE — PROGRESS NOTES
"Physical Therapy    Physical Therapy Treatment    Patient Name: Cortney Ruby  MRN: [id number]  Today's Date: 6/3/2025    Time Entry:   Time Calculation  Start Time: 0200  Stop Time: 0242  Time Calculation (min): 42 min     PT Therapeutic Procedures Time Entry  Therapeutic Exercise Time Entry: 42                   Assessment:  She's walking into the clinic with her st cane with minimal antalgia.  She's walking in her home without her st cane and in the clinic today with minimal antalgia.  Her AROM is good and normal Rt knee strength with MMT.  Functional she's doing much better but if she over does it her pain increases at the current time.        Plan:  Recheck her next treatment      Current Problem  1. Chronic pain of right knee  Follow Up In Physical Therapy      2. Weakness of right lower extremity  Follow Up In Physical Therapy            Subjective  She reports a little soreness in her Rt knee today.      Pain  Pain Assessment  Pain Assessment: 0-10  0-10 (Numeric) Pain Score: 3  Pain Type: Chronic pain, Surgical pain  Pain Location: Knee  Pain Orientation: Right    Objective     Rt knee AROM: 0 to 120 degrees of flexion.     Rt knee 5/5 strength with resistance, MMT     Treatments:    NuStep, L4, 10 minutes  Squats, 15 reps x 2  HR/TR, 1/2 roll, 30 reps  Standing hip 3-way, GTB, 15 reps x 2  Standing HSC, 3 lbs, 15 reps x 2  Forward step ups, 6 in, 20 reps  Reverse step ups, 4 in, 20 reps  6\" step overs, 10 reps x 2  Rt SLS with 1 UE assist, 5-10 second hold, 10 reps      STM/patellar/joint mobs    "

## 2025-06-04 ENCOUNTER — PHARMACY VISIT (OUTPATIENT)
Dept: PHARMACY | Facility: CLINIC | Age: 65
End: 2025-06-04
Payer: COMMERCIAL

## 2025-06-17 ENCOUNTER — APPOINTMENT (OUTPATIENT)
Dept: PHYSICAL THERAPY | Facility: HOSPITAL | Age: 65
End: 2025-06-17
Payer: COMMERCIAL

## 2025-06-17 DIAGNOSIS — M25.561 CHRONIC PAIN OF RIGHT KNEE: ICD-10-CM

## 2025-06-17 DIAGNOSIS — G89.29 CHRONIC PAIN OF RIGHT KNEE: ICD-10-CM

## 2025-06-17 DIAGNOSIS — R29.898 WEAKNESS OF RIGHT LOWER EXTREMITY: ICD-10-CM

## 2025-06-17 PROCEDURE — 97110 THERAPEUTIC EXERCISES: CPT | Mod: GP | Performed by: PHYSICAL THERAPIST

## 2025-06-17 ASSESSMENT — PAIN SCALES - GENERAL: PAINLEVEL_OUTOF10: 0 - NO PAIN

## 2025-06-17 ASSESSMENT — PAIN - FUNCTIONAL ASSESSMENT: PAIN_FUNCTIONAL_ASSESSMENT: 0-10

## 2025-06-17 NOTE — PROGRESS NOTES
"Physical Therapy    Physical Therapy Treatment    Patient Name: Cortney Ruby  MRN: [id number]  Today's Date: 6/17/2025    Time Entry:   Time Calculation  Start Time: 0145  Stop Time: 0225  Time Calculation (min): 40 min     PT Therapeutic Procedures Time Entry  Therapeutic Exercise Time Entry: 40                   Assessment:  She's been seen for 10 PT treatments being post op Rt TKR.  She's doing much better and having no Rt knee pain today.  Her AROM is good and normal strength in her Rt knee/LE with MMT.  No difficulty with her instrumental ADL's and work activities around the house.        Plan:  Discharge      Current Problem  1. Chronic pain of right knee  Follow Up In Physical Therapy      2. Weakness of right lower extremity  Follow Up In Physical Therapy          Subjective  Pt reports her Rt knee is feeling better and having no pain today.       Pain  Pain Assessment  Pain Assessment: 0-10  0-10 (Numeric) Pain Score: 0 - No pain    Objective     Rt knee AROM: 0 to 120 degrees of flexion.     Rt knee 5/5 strength with resistance, MMT      Outcome Measures:  Other Measures  Lower Extremity Funtional Score (LEFS): 39/80    Treatments:    NuStep, L4, 10 minutes  Squats, 15 reps x 2  HR/TR, 1/2 roll, 30 reps  Standing hip 3-way, GTB, 15 reps x 2  Standing HSC, 3 lbs, 15 reps x 2  Forward step ups, 6 in, 20 reps  Reverse step ups, 4 in, 20 reps  6\" step overs, 10 reps x 2  Rt SLS with 1 UE assist, 5-10 second hold, 10 reps      STM/patellar/joint mobs    Goals:   0/10 Rt knee pain.  Met  Rt knee AROM 0 - 110-120 degrees of flexion.  Met  Rt knee/LE 5/5 strength grossly throughout.  Met  Minimal antalgia to normal gait pattern with least restrictive device.  Met  Pt walk up and down a flight of stairs with 1 rail with reciprocal gait pattern.  Met  No difficulty or pain performing basic and instrumental ADL's and work activities around the house.  Met  Independent with HEP.   Met     "

## 2025-06-21 DIAGNOSIS — Z79.4 TYPE 2 DIABETES MELLITUS WITH OTHER SPECIFIED COMPLICATION, WITH LONG-TERM CURRENT USE OF INSULIN (HCC): ICD-10-CM

## 2025-06-21 DIAGNOSIS — E11.69 TYPE 2 DIABETES MELLITUS WITH OTHER SPECIFIED COMPLICATION, WITH LONG-TERM CURRENT USE OF INSULIN (HCC): ICD-10-CM

## 2025-06-23 RX ORDER — INSULIN ASPART 100 [IU]/ML
INJECTION, SUSPENSION SUBCUTANEOUS
Qty: 30 ML | Refills: 3 | Status: SHIPPED | OUTPATIENT
Start: 2025-06-23

## 2025-06-24 PROCEDURE — RXMED WILLOW AMBULATORY MEDICATION CHARGE

## 2025-06-25 ENCOUNTER — PHARMACY VISIT (OUTPATIENT)
Dept: PHARMACY | Facility: CLINIC | Age: 65
End: 2025-06-25
Payer: COMMERCIAL

## 2025-06-26 DIAGNOSIS — Z96.651 S/P TOTAL KNEE ARTHROPLASTY, RIGHT: Primary | ICD-10-CM

## 2025-06-30 ENCOUNTER — HOSPITAL ENCOUNTER (OUTPATIENT)
Dept: RADIOLOGY | Facility: CLINIC | Age: 65
Discharge: HOME | End: 2025-06-30
Payer: COMMERCIAL

## 2025-06-30 ENCOUNTER — OFFICE VISIT (OUTPATIENT)
Dept: ORTHOPEDIC SURGERY | Facility: CLINIC | Age: 65
End: 2025-06-30
Payer: COMMERCIAL

## 2025-06-30 DIAGNOSIS — Z96.651 S/P TOTAL KNEE ARTHROPLASTY, RIGHT: Primary | ICD-10-CM

## 2025-06-30 DIAGNOSIS — Z96.651 S/P TOTAL KNEE ARTHROPLASTY, RIGHT: ICD-10-CM

## 2025-06-30 PROCEDURE — 73562 X-RAY EXAM OF KNEE 3: CPT | Mod: RIGHT SIDE | Performed by: ORTHOPAEDIC SURGERY

## 2025-06-30 PROCEDURE — 73562 X-RAY EXAM OF KNEE 3: CPT | Mod: RT

## 2025-06-30 PROCEDURE — 3048F LDL-C <100 MG/DL: CPT | Performed by: ORTHOPAEDIC SURGERY

## 2025-06-30 PROCEDURE — 99212 OFFICE O/P EST SF 10 MIN: CPT | Performed by: ORTHOPAEDIC SURGERY

## 2025-06-30 PROCEDURE — 3044F HG A1C LEVEL LT 7.0%: CPT | Performed by: ORTHOPAEDIC SURGERY

## 2025-06-30 PROCEDURE — 99024 POSTOP FOLLOW-UP VISIT: CPT | Performed by: ORTHOPAEDIC SURGERY

## 2025-06-30 RX ORDER — AMOXICILLIN 500 MG/1
CAPSULE ORAL
Qty: 4 CAPSULE | Refills: 4 | Status: SHIPPED | OUTPATIENT
Start: 2025-06-30

## 2025-06-30 NOTE — PROGRESS NOTES
Chief Complaint   Patient presents with    Right Knee - Follow-up     TKA 4-9-25  Xrays today       The patient is here for follow-up of their side: right knee arthroplasty.  The patient has mild knee pain.  The patient has no mechanical symptoms.  The patient has mild swelling.  The patient is approximately 3 month(s) postop    Physical examination:    Examination of the side: right knee  The incision is healing well  No erythema or warmth.  No instability varus or valgus stressing the knee at 0, 30 or 60 degrees.  No instability in the AP plane at 90 degrees.  Range of motion: 0 degrees extension, 120 degrees flexion  There is mild tenderness  Calf is soft, Homans negative  The patient has intact ankle dorsiflexion and plantarflexion.    Radiographs:   XR knee right 3 views  Interpreted By:  Azael Cornelius,   STUDY:  XR KNEE RIGHT 3 VIEWS; ; 6/30/2025 11:41 am      INDICATION:  Signs/Symptoms:pain.      ACCESSION NUMBER(S):  [id number]      ORDERING CLINICIAN:  AZAEL CORNELIUS      FINDINGS:  Right knee films show a total knee arthroplasty in satisfactory  position. The patella is well positioned. No fracture is identified.  No obvious loosening or wear is appreciated.          Signed by: Azael Cornelius 6/30/2025 11:47 AM  Dictation workstation:   [id number]        Impression:  Status post side: right total knee arthroplasty    Plan:  Amoxicillin prescription provided for future dental antibiotic  Follow up in 3 months with XR   All questions answered

## 2025-07-11 RX ORDER — BLOOD-GLUCOSE METER
EACH MISCELLANEOUS
Qty: 1 KIT | Refills: 0 | Status: SHIPPED | OUTPATIENT
Start: 2025-07-11

## 2025-07-11 NOTE — TELEPHONE ENCOUNTER
Requested Prescriptions     Pending Prescriptions Disp Refills    Blood Glucose Monitoring Suppl (CONTOUR NEXT EZ) w/Device KIT 1 kit 0     Sig: GIVE 1 METER    blood glucose test strips (ASCENSIA AUTODISC VI;ONE TOUCH ULTRA TEST VI) strip 100 each 3     Sig: As needed. E11.65

## 2025-07-14 ENCOUNTER — APPOINTMENT (OUTPATIENT)
Dept: PHARMACY | Facility: HOSPITAL | Age: 65
End: 2025-07-14
Payer: COMMERCIAL

## 2025-07-14 DIAGNOSIS — E66.01 MORBID OBESITY (MULTI): ICD-10-CM

## 2025-07-14 DIAGNOSIS — Z79.4 TYPE 2 DIABETES MELLITUS WITH HYPERGLYCEMIA, WITH LONG-TERM CURRENT USE OF INSULIN: ICD-10-CM

## 2025-07-14 DIAGNOSIS — E11.9 TYPE 2 DIABETES MELLITUS WITHOUT COMPLICATION, WITH LONG-TERM CURRENT USE OF INSULIN: ICD-10-CM

## 2025-07-14 DIAGNOSIS — Z79.4 TYPE 2 DIABETES MELLITUS WITHOUT COMPLICATION, WITH LONG-TERM CURRENT USE OF INSULIN: ICD-10-CM

## 2025-07-14 DIAGNOSIS — E11.65 TYPE 2 DIABETES MELLITUS WITH HYPERGLYCEMIA, WITH LONG-TERM CURRENT USE OF INSULIN: ICD-10-CM

## 2025-07-16 PROCEDURE — RXMED WILLOW AMBULATORY MEDICATION CHARGE

## 2025-07-16 RX ORDER — SEMAGLUTIDE 1.34 MG/ML
1 INJECTION, SOLUTION SUBCUTANEOUS WEEKLY
Qty: 3 ML | Refills: 2 | Status: SHIPPED | OUTPATIENT
Start: 2025-07-16

## 2025-07-16 NOTE — ASSESSMENT & PLAN NOTE
Patient's goal A1c is < 7%.  Is pt at goal? Yes, 6.1% on 5/29/25  Patient's SMBGs are at goal  Rationale for plan: Will plan to continue with current therapy for now due to patients controlled BG and re-assess at next visit. Advised to make sure to eat throughout the day to prevent lows, especially as patient is taking insulin.      Medication Changes: None     CONTINUE:  Humalog Mix 75/25: 30 units subcutaneously BID  Ozempic 1 mg weekly     Future Considerations:  Titrate GLP1  Start SGLT2i  Consider ACEi for renoprotective properties  Decrease insulin dosage     Monitoring and Education:  Continue to follow with endocrinologist as scheduled. Patient has follow up with Dr. Fields in December.   Ozempic Education:   Counseled patient on Ozempic MOA, expectations, side effects, duration of therapy, administration, and monitoring parameters.  Provided detailed dosing and administration counseling to ensure proper technique.   Reviewed Ozempic titration schedule, starting with 0.25 mg once weekly for 4 weeks, then continuing on 0.5 mg once weekly. Pt verbalized understanding.  Counseled patient on the benefits of GLP-1ra, such as cardiovascular risk reduction, glycemic control, and weight loss potential.  Reviewed storage requirements of Ozempic when not in use, and when to administer the medication if a dose is missed.  Advised patient that they may experience improved satiety after meals and portion sizes of meals may be reduced as doses of Ozempic increase.  Counseled patient to avoid foods that are fatty/oily as this may precipitate the nausea/GI upset that may occur with new start Ozempic.

## 2025-07-16 NOTE — PROGRESS NOTES
Clinical Pharmacy Appointment    Patient ID: Cortney Ruby is a 64 y.o. female who presents for Diabetes.    Referring Provider: Mayra Lewis MD     Pt is here for Follow Up appointment.      Referring Provider: Mayra Lewis  PCP: Mayra Lewis MD   Last visit with PCP: 3/18/25  Next visit with PCP: 25     Endocrinology: Kyle Donnie @ [facility]  Last visit: 25    Subjective   Interval History:  Had knee replacement  PT --> seeing 2x/week  DM  A1c stable from previous  Continues on Ozempic 1 mg/week  SE: denies  Significant appetite suppression; reduced portions  Weight loss fluctuating  SMBGs continues to be within goal; applauded patient for continued efforts     Glucose Monitoring Regimen:  Patient is using glucometer; testing daily     Reported SMBG Measurements:  FB this AM  PP at bedtime    Any episodes of hypoglycemia? No     Adverse Effects:   Patient denies any GI adverse effects (Upset stomach/diarrhea/constipation/nausea/vomiting)     Adherence: Excellent  Affordability/Accessibility  No issues with cost of medications at this time.      Diabetes Pharmacotherapy:    Humalog Mix 75/25: 30 units BID  Ozempic 1 mg once weekly on      Historical Diabetes Pharmacotherapy:  None     Secondary Prevention  Statin? Yes (Atorvastatin 40 mg)  ACE-I/ARB? No  Aspirin? No     Pertinent PMH Review  PMH of Pancreatitis: No  PMH of Retinopathy: No  PMH of Recurrent Urinary Tract Infections: No  PMH of Medullary Thyroid Cancer (MTC): No  PMH of Multiple Endocrine Neoplasia (MEN) Type II: No     Health Maintenance:   Foot Exam: unknown  Eye Exam: unknown  Lipid Panel: LDL 52 mg/dL   UACR: 11.3 (2024)  Influenza Vaccine: not up to date  Pneumonia Vaccine: PCV20 24     Drug Interactions: No significant drug-drug interactions exist that require change in therapy.     Objective     There were no vitals taken for this visit.     Labs  Lab Results   Component Value  Date    BILITOT 0.7 03/26/2025    CALCIUM 8.6 04/10/2025    CO2 23 04/10/2025     04/10/2025    CREATININE 0.54 04/10/2025    GLUCOSE 180 (H) 04/10/2025    ALKPHOS 83 03/26/2025    K 4.0 04/10/2025    PROT 6.5 03/26/2025     04/10/2025    AST 18 03/26/2025    ALT 25 03/26/2025    BUN 15 04/10/2025    ANIONGAP 11 04/10/2025    MG 1.91 03/26/2025    ALBUMIN 4.1 03/26/2025    GFRF >90 05/08/2023     Lab Results   Component Value Date    TRIG 206 (H) 03/26/2025    CHOL 144 03/26/2025    LDLCALC 52 03/26/2025    HDL 51.1 03/26/2025     Lab Results   Component Value Date    HGBA1C 6.1 (H) 05/29/2025       Medications Ordered Prior to Encounter[1]     Assessment/Plan   Problem List Items Addressed This Visit           ICD-10-CM    Morbid obesity (Multi) E66.01    Type 2 diabetes mellitus with hyperglycemia, with long-term current use of insulin E11.65, Z79.4    Patient's goal A1c is < 7%.  Is pt at goal? Yes, 6.1% on 5/29/25  Patient's SMBGs are at goal  Rationale for plan: Will plan to continue with current therapy for now due to patients controlled BG and re-assess at next visit. Advised to make sure to eat throughout the day to prevent lows, especially as patient is taking insulin.      Medication Changes: None     CONTINUE:  Humalog Mix 75/25: 30 units subcutaneously BID  Ozempic 1 mg weekly     Future Considerations:  Titrate GLP1  Start SGLT2i  Consider ACEi for renoprotective properties  Decrease insulin dosage     Monitoring and Education:  Continue to follow with endocrinologist as scheduled. Patient has follow up with Dr. Fields in December.   Ozempic Education:   Counseled patient on Ozempic MOA, expectations, side effects, duration of therapy, administration, and monitoring parameters.  Provided detailed dosing and administration counseling to ensure proper technique.   Reviewed Ozempic titration schedule, starting with 0.25 mg once weekly for 4 weeks, then continuing on 0.5 mg once weekly. Pt  "verbalized understanding.  Counseled patient on the benefits of GLP-1ra, such as cardiovascular risk reduction, glycemic control, and weight loss potential.  Reviewed storage requirements of Ozempic when not in use, and when to administer the medication if a dose is missed.  Advised patient that they may experience improved satiety after meals and portion sizes of meals may be reduced as doses of Ozempic increase.  Counseled patient to avoid foods that are fatty/oily as this may precipitate the nausea/GI upset that may occur with new start Ozempic.           Minerva Jeronimo, JonelleD  Clinical Ambulatory Care Pharmacist  Ph: [phone]    Continue all meds under the continuation of care with the referring provider and clinical pharmacy team.    Clinical Pharmacist follow up: 10/6/25 or sooner as needed based on clinical intervention  Type of Encounter:  Telephone    Verbal consent to manage patient's drug therapy was obtained from the patient. They were informed they may decline to participate or withdraw from participation in pharmacy services at any time.       [1]   Current Outpatient Medications on File Prior to Visit   Medication Sig Dispense Refill    amLODIPine (Norvasc) 2.5 mg tablet Take 1 tablet (2.5 mg) by mouth once daily. 90 tablet 3    amoxicillin (Amoxil) 500 mg capsule Take 4 capsules by mouth  1 hour before dental procedure and none after 4 capsule 4    atorvastatin (Lipitor) 40 mg tablet Take 1 tablet (40 mg) by mouth once daily. 90 tablet 3    BD Ultra-Fine Micro Pen Needle 32 gauge x 1/4\" needle USE TWICE DAILY      Combivent Respimat  mcg/actuation inhaler Inhale 1 puff 4 times a day. 12 g 3    cyclobenzaprine (Flexeril) 10 mg tablet Take 1 tablet (10 mg) by mouth 3 times a day as needed for muscle spasms for up to 7 days. 21 tablet 0    fluticasone-umeclidin-vilanter (Trelegy Ellipta) 100-62.5-25 mcg blister with device Inhale 1 puff once daily. 60 each 3    ibuprofen 800 mg tablet Take 1 " tablet (800 mg) by mouth every 8 hours if needed (pain). 270 tablet 0    naloxone (Narcan) 4 mg/0.1 mL nasal spray Instill 1 spray intranasally for opioid overdose; repeat in 5 minutes if no response. (Patient not taking: Reported on 4/9/2025) 2 each 0    NovoLOG Mix 70-30FlexPen U-100 100 unit/mL (70-30) pen ADMINISTER 40 UNITS UNDER THE SKIN TWICE DAILY      OneTouch Delica Plus Lancet 33 gauge misc USE DAILY      OneTouch Ultra Test strip TEST BID AND PRN      semaglutide (Ozempic) 1 mg/dose (4 mg/3 mL) pen injector Inject 1 mg under the skin 1 (one) time per week. 3 mL 1     No current facility-administered medications on file prior to visit.

## 2025-07-17 ENCOUNTER — PHARMACY VISIT (OUTPATIENT)
Dept: PHARMACY | Facility: CLINIC | Age: 65
End: 2025-07-17
Payer: COMMERCIAL

## 2025-08-20 PROCEDURE — RXMED WILLOW AMBULATORY MEDICATION CHARGE

## 2025-08-21 ENCOUNTER — PHARMACY VISIT (OUTPATIENT)
Dept: PHARMACY | Facility: CLINIC | Age: 65
End: 2025-08-21
Payer: COMMERCIAL

## 2025-09-19 ENCOUNTER — APPOINTMENT (OUTPATIENT)
Dept: PRIMARY CARE | Facility: CLINIC | Age: 65
End: 2025-09-19
Payer: COMMERCIAL

## 2025-10-06 ENCOUNTER — APPOINTMENT (OUTPATIENT)
Dept: PHARMACY | Facility: HOSPITAL | Age: 65
End: 2025-10-06
Payer: COMMERCIAL

## (undated) DEVICE — NEEDLE, SAFETY, 25 GA X 1.5 IN

## (undated) DEVICE — SUTURE, MONOCRYL, 4-0, 27 IN, PS-2, UNDYED

## (undated) DEVICE — SUTURE, ETHIBOND XTRA, 1, 30 IN, CTX, LF

## (undated) DEVICE — TOWEL PACK, STERILE, 16X24, XRAY DETECTABLE, BLUE, 4/PK

## (undated) DEVICE — SKIN MARKER,REGULAR TIP WITH RULER: Brand: DEVON

## (undated) DEVICE — PROTECTANT, SKIN, DRESSING, WIPE, NO STING

## (undated) DEVICE — INTENDED FOR TISSUE SEPARATION, AND OTHER PROCEDURES THAT REQUIRE A SHARP SURGICAL BLADE TO PUNCTURE OR CUT.: Brand: BARD-PARKER ® CARBON RIB-BACK BLADES

## (undated) DEVICE — SOLUTION, IRRIGATION, STERILE WATER, 1000 ML, HANG BOTTLE

## (undated) DEVICE — STERILE LATEX POWDER-FREE SURGICAL GLOVESWITH NITRILE COATING: Brand: PROTEXIS

## (undated) DEVICE — 3M™ STERI-STRIP™ REINFORCED ADHESIVE SKIN CLOSURES, R1541, 1/4 IN X 3 IN (6 MM X 75 MM), 3 STRIPS/ENVELOPE: Brand: 3M™ STERI-STRIP™

## (undated) DEVICE — CEMENT, MIXEVAC III, 10S BOWL, KNEES

## (undated) DEVICE — GOWN, SURGICAL, ROYAL SILK, XL, STERILE

## (undated) DEVICE — SUTURE, VICRYL 2-0, TAPER POINT, CT-1 UNDYED 27 INCH

## (undated) DEVICE — STRAP, VELCRO, BODY, 4 X 60IN, NS

## (undated) DEVICE — DRAPE, SHEET, 17 X 23 IN

## (undated) DEVICE — STRAP, ARM BOARD, 32 X 1.5

## (undated) DEVICE — BANDAGE, ELASTIC, 4 X 10YD, BEIGE, LF

## (undated) DEVICE — TOWEL PACK, STERILE, 4/PACK, BLUE

## (undated) DEVICE — BOWL, BASIN, 32 OZ, STERILE

## (undated) DEVICE — GLOVE, SURGICAL, PROTEXIS PI , 8.5, PF, LF

## (undated) DEVICE — WRAP, DEMAYO, LEG, STERILE

## (undated) DEVICE — GOWN, SURGICAL, ROYAL SILK, XXL, STERILE

## (undated) DEVICE — ENT II-LF: Brand: MEDLINE INDUSTRIES, INC.

## (undated) DEVICE — LABEL MED MINI W/ MARKER

## (undated) DEVICE — STANDARD HYPODERMIC NEEDLE,POLYPROPYLENE HUB: Brand: MONOJECT

## (undated) DEVICE — SUTURE, PLAIN, 5-0, 18 IN, PC1, YELLOW

## (undated) DEVICE — BANDAGE, ELASTIC, MATRIX, SELF-CLOSURE, 2 IN X 5 YD, LF

## (undated) DEVICE — GLOVE, SURGICAL, PROTEXIS PI , 8.0, PF, LF

## (undated) DEVICE — BLADE, STRYKER, OSC, 19 X 90 X 1.27G

## (undated) DEVICE — PROTECTOR, NERVE, ULNAR, PINK

## (undated) DEVICE — Device

## (undated) DEVICE — SOLUTION, IRRIGATION, USP, SODIUM CHLORIDE 0.9%, 3000 ML

## (undated) DEVICE — SUTURE, ETHILON, 4-0, BLK, MONO, PS-2 18

## (undated) DEVICE — DRESSING, GAUZE, PETROLATUM, STRIP, XEROFORM, 1 X 8 IN, STERILE

## (undated) DEVICE — CORD, FORCEP, BIPOLAR, DISP

## (undated) DEVICE — GOWN,AURORA,NONREINFORCED,LARGE: Brand: MEDLINE

## (undated) DEVICE — GLOVE, SURGICAL, PROTEXIS PI BLUE W/NEUTHERA, 7.5, PF, LF

## (undated) DEVICE — SUTURE VCRL SZ 5-0 L18IN ABSRB UD P-3 L13MM 3/8 CIR PRIM J493H

## (undated) DEVICE — TRAY, DRY PREP, PREMIUM

## (undated) DEVICE — 1842 FOAM BLOCK NEEDLE COUNTER: Brand: DEVON

## (undated) DEVICE — DRAPE, SHEET, XL

## (undated) DEVICE — SUTURE, VICRYL, 3-0, 27 IN X-1, UNDYED

## (undated) DEVICE — HOLSTER, ELECTROSURGERY ACCESSORY, STERILE

## (undated) DEVICE — Z DISCONTINUED USE 2218182 SUTURE ABSORBABLE MONOFILAMENT 6-0 PC1 18 IN 13 MM PLN GUT

## (undated) DEVICE — PADDING, CAST, SPECIALIST, 6 IN X 4 YD, STERILE

## (undated) DEVICE — DRAPE, SHEET, EXTREMITY, W/ARM BOARD COVERS, 87 X 106 X 128 IN, DISPOSABLE, LF, STERILE

## (undated) DEVICE — TOWEL PACK 10-PK

## (undated) DEVICE — BANDAGE, ELASTIC, 6 X 10YD, BEIGE, LF

## (undated) DEVICE — SYRINGE, 60 CC, IRRIGATION, BULB, CONTRO-BULB, PAPER POUCH

## (undated) DEVICE — TUBING, IRRIGATION, HIGH FLOW, HAND PIECE SET

## (undated) DEVICE — CAUTERY, PENCIL, PUSH BUTTON, SMOKE EVAC, 70MM

## (undated) DEVICE — GOWN, SURGICAL, ROYAL SILK, LG, STERILE

## (undated) DEVICE — SPLINT, SAFETY, PRE-CUT, 3 X 12 IN

## (undated) DEVICE — BANDAGE, ESMARK 4 IN X 9 FT, STERILE

## (undated) DEVICE — DRESSING, MEPILEX BORDER, POST-OP AG, 4 X 12 IN

## (undated) DEVICE — CUFF, TOURNIQUET, 30 X 4, DUAL PORT/SNGL BLADDER, DISP, LF

## (undated) DEVICE — DRAPE, SHEET, U, W/ADHESIVE STRIP, IMPERVIOUS, 60 X 70 IN, DISPOSABLE, LF, STERILE

## (undated) DEVICE — HOOD, SURGICAL, FLYTE, T7

## (undated) DEVICE — ELECTRODE, ELECTROSURGICAL, BLADE, EXTENDED

## (undated) DEVICE — PADDING, CAST, SPECIALIST, 3 IN X 4 YD, STERILE

## (undated) DEVICE — SYRINGE, 10 CC, LUER LOCK

## (undated) DEVICE — WOUND SYSTEM, DEBRIDEMENT & CLEANING, O.R DUOPAK

## (undated) DEVICE — DISCONTINUED USE 393278 SYRINGE 10 ML HYPO W/O NDL LL TP PLSTC ST

## (undated) DEVICE — SUTURE, ETHILON, 3-0, 18 IN, PS1, BLACK

## (undated) DEVICE — GLOVE, SURGICAL, PROTEXIS PI , 7.5, PF, LF

## (undated) DEVICE — SUTURE, VICRYL, 1, 36 IN, V-34, VIOLET

## (undated) DEVICE — X-RAY DETECTABLE SPONGES,16 PLY: Brand: VISTEC

## (undated) DEVICE — ELECTRODE, ELECTROSURGICAL, BLADE, INSULATED, ENT/IMA, STERILE

## (undated) DEVICE — MANIFOLD, 4 PORT NEPTUNE STANDARD

## (undated) DEVICE — SUTURE PDS II SZ 6-0 L18IN ABSRB VLT P-1 L11MM 3/8 CIR REV Z487G

## (undated) DEVICE — TOWEL PAPR W13XL18IN WHT POLYETH SFT ABSRB SURF BK 3 PLY

## (undated) DEVICE — APPLICATOR, CHLORAPREP, W/ORANGE TINT, 26ML

## (undated) DEVICE — SUTURE, VICRYL, CTD, 4-0, 18 IN, P-3, UNDYED

## (undated) DEVICE — ADHESIVE, SKIN, DERMABOND ADVANCED, 15CM, PEN-STYLE

## (undated) DEVICE — SYRINGE BLB 50CC IRRIG PLIABLE FNGR FLNG GRAD FLSK DISP